# Patient Record
Sex: MALE | Race: BLACK OR AFRICAN AMERICAN | NOT HISPANIC OR LATINO | Employment: OTHER | URBAN - METROPOLITAN AREA
[De-identification: names, ages, dates, MRNs, and addresses within clinical notes are randomized per-mention and may not be internally consistent; named-entity substitution may affect disease eponyms.]

---

## 2017-03-16 ENCOUNTER — HOSPITAL ENCOUNTER (OUTPATIENT)
Dept: RADIOLOGY | Facility: OTHER | Age: 72
Discharge: HOME | End: 2017-03-16
Attending: INTERNAL MEDICINE
Payer: MEDICARE

## 2017-03-16 DIAGNOSIS — I65.21 STENOSIS OF RIGHT CAROTID ARTERY: ICD-10-CM

## 2017-03-16 DIAGNOSIS — I65.21 OCCLUSION OF RIGHT CAROTID ARTERY: ICD-10-CM

## 2017-03-16 PROCEDURE — 93880 EXTRACRANIAL BILAT STUDY: CPT | Mod: 26,,, | Performed by: RADIOLOGY

## 2017-03-16 PROCEDURE — 93880 EXTRACRANIAL BILAT STUDY: CPT | Mod: TC

## 2017-04-13 ENCOUNTER — OFFICE VISIT (OUTPATIENT)
Dept: PODIATRY | Facility: CLINIC | Age: 72
End: 2017-04-13
Payer: MEDICARE

## 2017-04-13 VITALS
RESPIRATION RATE: 18 BRPM | BODY MASS INDEX: 24.49 KG/M2 | SYSTOLIC BLOOD PRESSURE: 128 MMHG | DIASTOLIC BLOOD PRESSURE: 75 MMHG | WEIGHT: 197 LBS | HEART RATE: 62 BPM | HEIGHT: 75 IN

## 2017-04-13 DIAGNOSIS — B35.1 ONYCHOMYCOSIS DUE TO DERMATOPHYTE: ICD-10-CM

## 2017-04-13 DIAGNOSIS — E11.49 TYPE II DIABETES MELLITUS WITH NEUROLOGICAL MANIFESTATIONS: Primary | ICD-10-CM

## 2017-04-13 DIAGNOSIS — L84 CORN OR CALLUS: ICD-10-CM

## 2017-04-13 PROCEDURE — 99213 OFFICE O/P EST LOW 20 MIN: CPT | Mod: PBBFAC | Performed by: PODIATRIST

## 2017-04-13 PROCEDURE — 99499 UNLISTED E&M SERVICE: CPT | Mod: S$PBB,,, | Performed by: PODIATRIST

## 2017-04-13 PROCEDURE — 11721 DEBRIDE NAIL 6 OR MORE: CPT | Mod: 59,Q9,PBBFAC | Performed by: PODIATRIST

## 2017-04-13 PROCEDURE — 11056 PARNG/CUTG B9 HYPRKR LES 2-4: CPT | Mod: Q9,S$PBB,, | Performed by: PODIATRIST

## 2017-04-13 PROCEDURE — 11721 DEBRIDE NAIL 6 OR MORE: CPT | Mod: 59,Q9,S$PBB, | Performed by: PODIATRIST

## 2017-04-13 PROCEDURE — 99999 PR PBB SHADOW E&M-EST. PATIENT-LVL III: CPT | Mod: PBBFAC,,, | Performed by: PODIATRIST

## 2017-04-13 PROCEDURE — 11056 PARNG/CUTG B9 HYPRKR LES 2-4: CPT | Mod: Q9,PBBFAC | Performed by: PODIATRIST

## 2017-04-13 RX ORDER — METFORMIN HYDROCHLORIDE 500 MG/1
TABLET ORAL
COMMUNITY
Start: 2017-01-19 | End: 2019-07-22

## 2017-04-13 RX ORDER — BIMATOPROST 0.1 MG/ML
SOLUTION/ DROPS OPHTHALMIC NIGHTLY
COMMUNITY
Start: 2017-03-28

## 2017-04-13 NOTE — PROGRESS NOTES
Subjective:      Patient ID: Klever Link is a 72 y.o. male.    Chief Complaint: PCP (PETRONA SHI 3/17); Diabetic Foot Exam; Nail Problem; and Nail Care    Klever is a 72 y.o. male who presents to the clinic for evaluation and treatment of high risk feet. Klever has a past medical history of Hypertension. The patient's chief complaint is long, thick toenails. This patient has documented high risk feet requiring routine maintenance secondary to peripheral vascular disease.    PCP: Petrona Shi MD    Date Last Seen by PCP:   Chief Complaint   Patient presents with    PCP     PETRONA SHI 3/17    Diabetic Foot Exam    Nail Problem    Nail Care       Current shoe gear:  Casual shoes          Patient Active Problem List   Diagnosis    Diabetes mellitus, new onset    DM type 2 (diabetes mellitus, type 2)    Acute renal insufficiency    Dehydration    Hyperglycemia    Hypertension    Hypoxia    Glucosuria    Hyponatremia    TIA (transient ischemic attack)    Expressive aphasia    Hypertriglyceridemia       Current Outpatient Prescriptions on File Prior to Visit   Medication Sig Dispense Refill    aspirin 325 MG tablet Take 325 mg by mouth once daily.      atorvastatin (LIPITOR) 20 MG tablet Take 20 mg by mouth once daily.      blood sugar diagnostic Strp 1 strip by Misc.(Non-Drug; Combo Route) route 3 (three) times daily. 100 strip 1    econazole nitrate 1 % cream Apply topically 2 (two) times daily. 85 g 1    hydrochlorothiazide (HYDRODIURIL) 25 MG tablet Take 25 mg by mouth once daily.      lancets (ONE TOUCH DELICA LANCETS) 33 gauge Misc 1 lancet by Misc.(Non-Drug; Combo Route) route 3 (three) times daily before meals. 100 each 1    lisinopril (PRINIVIL,ZESTRIL) 20 MG tablet Take 20 mg by mouth once daily.       No current facility-administered medications on file prior to visit.        Review of patient's allergies indicates:  No Known Allergies    Past Surgical History:  "  Procedure Laterality Date    KNEE SURGERY Bilateral        Family History   Problem Relation Age of Onset    Diabetes Mother     Hypertension Father     Stroke Father     Diabetes Sister     Diabetes Sister     Diabetes Brother     Diabetes Sister     Diabetes Brother        Social History     Social History    Marital status:      Spouse name: N/A    Number of children: N/A    Years of education: N/A     Occupational History    Not on file.     Social History Main Topics    Smoking status: Former Smoker    Smokeless tobacco: Not on file    Alcohol use Yes    Drug use: No    Sexual activity: Yes     Partners: Female     Other Topics Concern    Not on file     Social History Narrative           Hemoglobin A1C   Date Value Ref Range Status   02/12/2015 13.5 (H) 4.5 - 6.2 % Final       Review of Systems   Constitution: Negative for chills, decreased appetite and fever.   Cardiovascular: Negative for leg swelling.   Skin: Positive for dry skin and nail changes.   Musculoskeletal: Negative for arthritis, back pain, joint pain, joint swelling and myalgias.   Gastrointestinal: Negative for nausea and vomiting.   Neurological: Positive for numbness. Negative for loss of balance and paresthesias.           Objective:       Vitals:    04/13/17 0716   BP: 128/75   Pulse: 62   Resp: 18   Weight: 89.4 kg (197 lb)   Height: 6' 3" (1.905 m)   PainSc: 0-No pain        Physical Exam   Constitutional: He is oriented to person, place, and time. He appears well-developed and well-nourished.   Cardiovascular:   Dorsalis pedis and posterior tibial pulses are diminished Bilaterally. Toes are cool to touch. Feet are warm proximally.There is decreased digital hair. Skin is atrophic, slightly hyperpigmented, and mildly edematous       Musculoskeletal: Normal range of motion. He exhibits no edema or tenderness.   Adequate joint range of motion without pain, limitation, nor crepitation Bilateral feet and ankle " joints. Muscle strength is 5/5 in all groups bilaterally.         Neurological: He is alert and oriented to person, place, and time.   [place]-Alex 5.07 monofilamant testing is diminished Dheeraj feet. Sharp/dull sensation diminished Bilaterally. Light touch absent Bilaterally.       Skin: Skin is warm, dry and intact. No ecchymosis and no lesion noted. No erythema.   Nails x 10  are elongated by  1-4mm's, thickened by 2-4 mm's, dystrophic, and are darkened in  coloration . Xerosis Bilaterally. No open lesions noted.    Hyperkeratotic tissue noted to medial HIPJ b/l      Psychiatric: He has a normal mood and affect. His behavior is normal.   Nursing note and vitals reviewed.            Assessment:       Encounter Diagnoses   Name Primary?    Type II diabetes mellitus with neurological manifestations Yes    Onychomycosis due to dermatophyte     Corn or callus          Plan:       Klever was seen today for pcp, diabetic foot exam, nail problem and nail care.    Diagnoses and all orders for this visit:    Type II diabetes mellitus with neurological manifestations    Onychomycosis due to dermatophyte    Corn or callus      I counseled the patient on his conditions, their implications and medical management.        - Shoe inspection. Diabetic Foot Education. Patient reminded of the importance of good nutrition and blood sugar control to help prevent podiatric complications of diabetes. Patient instructed on proper foot hygeine. We discussed wearing proper shoe gear, daily foot inspections, never walking without protective shoe gear, never putting sharp instruments to feet, routine podiatric nail visits every 2-3 months.      - With patient's permission, nails were aggressively reduced and debrided x 10 to their soft tissue attachment mechanically and with electric , removing all offending nail and debris. Patient relates relief following the procedure. He will continue to monitor the areas daily, inspect his  feet, wear protective shoe gear when ambulatory, moisturizer to maintain skin integrity and follow in this office in approximately 2-3 months, sooner p.r.n.    - After cleansing the  area w/ alcohol prep pad the above mentioned hyperkeratosis was trimmed utilizing No 15 scapel, to a smooth base with out incident. Patient tolerated this  well and reported comfort to the area of medial HIPJ b/l

## 2017-07-20 ENCOUNTER — OFFICE VISIT (OUTPATIENT)
Dept: PODIATRY | Facility: CLINIC | Age: 72
End: 2017-07-20
Payer: MEDICARE

## 2017-07-20 VITALS
HEIGHT: 75 IN | SYSTOLIC BLOOD PRESSURE: 110 MMHG | HEART RATE: 64 BPM | WEIGHT: 195 LBS | BODY MASS INDEX: 24.25 KG/M2 | DIASTOLIC BLOOD PRESSURE: 74 MMHG

## 2017-07-20 DIAGNOSIS — L84 CORN OR CALLUS: ICD-10-CM

## 2017-07-20 DIAGNOSIS — E11.49 TYPE II DIABETES MELLITUS WITH NEUROLOGICAL MANIFESTATIONS: Primary | ICD-10-CM

## 2017-07-20 DIAGNOSIS — B35.1 ONYCHOMYCOSIS DUE TO DERMATOPHYTE: ICD-10-CM

## 2017-07-20 PROCEDURE — 11721 DEBRIDE NAIL 6 OR MORE: CPT | Mod: 59,Q9,S$PBB, | Performed by: PODIATRIST

## 2017-07-20 PROCEDURE — 11056 PARNG/CUTG B9 HYPRKR LES 2-4: CPT | Mod: Q9,S$PBB,, | Performed by: PODIATRIST

## 2017-07-20 PROCEDURE — 99213 OFFICE O/P EST LOW 20 MIN: CPT | Mod: PBBFAC | Performed by: PODIATRIST

## 2017-07-20 PROCEDURE — 99499 UNLISTED E&M SERVICE: CPT | Mod: S$PBB,,, | Performed by: PODIATRIST

## 2017-07-20 PROCEDURE — 11056 PARNG/CUTG B9 HYPRKR LES 2-4: CPT | Mod: Q9,PBBFAC | Performed by: PODIATRIST

## 2017-07-20 PROCEDURE — 99999 PR PBB SHADOW E&M-EST. PATIENT-LVL III: CPT | Mod: PBBFAC,,, | Performed by: PODIATRIST

## 2017-07-20 PROCEDURE — 11721 DEBRIDE NAIL 6 OR MORE: CPT | Mod: 59,Q9,PBBFAC | Performed by: PODIATRIST

## 2017-07-20 NOTE — PROGRESS NOTES
Subjective:      Patient ID: Klever Link is a 72 y.o. male.    Chief Complaint: Diabetes Mellitus (pcp  Dr Welch  7/17); Diabetic Foot Exam; and Nail Care    Klever is a 72 y.o. male who presents to the clinic for evaluation and treatment of high risk feet. Klever has a past medical history of Hypertension. The patient's chief complaint is long, thick toenails. This patient has documented high risk feet requiring routine maintenance secondary to peripheral vascular disease.    PCP: Petrona Welch MD    Date Last Seen by PCP:   Chief Complaint   Patient presents with    Diabetes Mellitus     pcp  Dr Welch  7/17    Diabetic Foot Exam    Nail Care       Current shoe gear:  Casual shoes          Patient Active Problem List   Diagnosis    Diabetes mellitus, new onset    DM type 2 (diabetes mellitus, type 2)    Acute renal insufficiency    Dehydration    Hyperglycemia    Hypertension    Hypoxia    Glucosuria    Hyponatremia    TIA (transient ischemic attack)    Expressive aphasia    Hypertriglyceridemia       Current Outpatient Prescriptions on File Prior to Visit   Medication Sig Dispense Refill    ASPIR-LOW 81 mg EC tablet       aspirin 325 MG tablet Take 325 mg by mouth once daily.      atorvastatin (LIPITOR) 20 MG tablet Take 20 mg by mouth once daily.      blood sugar diagnostic Strp 1 strip by Misc.(Non-Drug; Combo Route) route 3 (three) times daily. 100 strip 1    hydrochlorothiazide (HYDRODIURIL) 25 MG tablet Take 25 mg by mouth once daily.      lancets (ONE TOUCH DELICA LANCETS) 33 gauge Misc 1 lancet by Misc.(Non-Drug; Combo Route) route 3 (three) times daily before meals. 100 each 1    lisinopril (PRINIVIL,ZESTRIL) 20 MG tablet Take 20 mg by mouth once daily.      LUMIGAN 0.01 % Drop       metformin (GLUCOPHAGE) 500 MG tablet       econazole nitrate 1 % cream Apply topically 2 (two) times daily. 85 g 1     No current facility-administered medications on file prior to visit.   "      Review of patient's allergies indicates:  No Known Allergies    Past Surgical History:   Procedure Laterality Date    KNEE SURGERY Bilateral        Family History   Problem Relation Age of Onset    Diabetes Mother     Hypertension Father     Stroke Father     Diabetes Sister     Diabetes Sister     Diabetes Brother     Diabetes Sister     Diabetes Brother        Social History     Social History    Marital status:      Spouse name: N/A    Number of children: N/A    Years of education: N/A     Occupational History    Not on file.     Social History Main Topics    Smoking status: Former Smoker    Smokeless tobacco: Not on file    Alcohol use Yes    Drug use: No    Sexual activity: Yes     Partners: Female     Other Topics Concern    Not on file     Social History Narrative    No narrative on file           Hemoglobin A1C   Date Value Ref Range Status   02/12/2015 13.5 (H) 4.5 - 6.2 % Final       Review of Systems   Constitution: Negative for chills, decreased appetite and fever.   Cardiovascular: Negative for leg swelling.   Skin: Positive for dry skin and nail changes. Negative for flushing, itching and poor wound healing.   Musculoskeletal: Negative for arthritis, back pain, joint pain, joint swelling and myalgias.   Gastrointestinal: Negative for nausea and vomiting.   Neurological: Positive for numbness. Negative for loss of balance and paresthesias.           Objective:       Vitals:    07/20/17 0917   BP: 110/74   Pulse: 64   Weight: 88.5 kg (195 lb)   Height: 6' 3" (1.905 m)   PainSc: 0-No pain        Physical Exam   Constitutional: He is oriented to person, place, and time. He appears well-developed and well-nourished.   Cardiovascular:   Dorsalis pedis and posterior tibial pulses are diminished Bilaterally. Toes are cool to touch. Feet are warm proximally.There is decreased digital hair. Skin is atrophic, slightly hyperpigmented, and mildly edematous       Musculoskeletal: " Normal range of motion. He exhibits no edema or tenderness.   Adequate joint range of motion without pain, limitation, nor crepitation Bilateral feet and ankle joints. Muscle strength is 5/5 in all groups bilaterally.         Neurological: He is alert and oriented to person, place, and time.   [place]-Alex 5.07 monofilamant testing is diminished Dheeraj feet. Sharp/dull sensation diminished Bilaterally. Light touch absent Bilaterally.       Skin: Skin is warm, dry and intact. No abrasion, no bruising, no ecchymosis and no lesion noted. No erythema.   Nails x 10  are elongated by  1-2mm's, thickened by 2-4 mm's, dystrophic, and are darkened in  coloration . Xerosis Bilaterally. No open lesions noted.    Hyperkeratotic tissue noted to medial HIPJ b/l      Psychiatric: He has a normal mood and affect. His behavior is normal.   Nursing note and vitals reviewed.            Assessment:       Encounter Diagnoses   Name Primary?    Type II diabetes mellitus with neurological manifestations Yes    Onychomycosis due to dermatophyte     Corn or callus          Plan:       Klever was seen today for diabetes mellitus, diabetic foot exam and nail care.    Diagnoses and all orders for this visit:    Type II diabetes mellitus with neurological manifestations    Onychomycosis due to dermatophyte    Corn or callus      I counseled the patient on his conditions, their implications and medical management.        - Shoe inspection. Diabetic Foot Education. Patient reminded of the importance of good nutrition and blood sugar control to help prevent podiatric complications of diabetes. Patient instructed on proper foot hygeine. We discussed wearing proper shoe gear, daily foot inspections, never walking without protective shoe gear, never putting sharp instruments to feet, routine podiatric nail visits every 2-3 months.      - With patient's permission, nails were aggressively reduced and debrided x 10 to their soft tissue attachment  mechanically and with electric , removing all offending nail and debris. Patient relates relief following the procedure. He will continue to monitor the areas daily, inspect his feet, wear protective shoe gear when ambulatory, moisturizer to maintain skin integrity and follow in this office in approximately 2-3 months, sooner p.r.n.    - After cleansing the  area w/ alcohol prep pad the above mentioned hyperkeratosis was trimmed utilizing No 15 scapel, to a smooth base with out incident. Patient tolerated this  well and reported comfort to the area of medial HIPJ b/l

## 2017-11-13 ENCOUNTER — OFFICE VISIT (OUTPATIENT)
Dept: PODIATRY | Facility: CLINIC | Age: 72
End: 2017-11-13
Payer: MEDICARE

## 2017-11-13 VITALS
SYSTOLIC BLOOD PRESSURE: 189 MMHG | BODY MASS INDEX: 24.25 KG/M2 | HEART RATE: 62 BPM | HEIGHT: 75 IN | DIASTOLIC BLOOD PRESSURE: 99 MMHG | WEIGHT: 195 LBS | RESPIRATION RATE: 18 BRPM

## 2017-11-13 DIAGNOSIS — B35.1 ONYCHOMYCOSIS DUE TO DERMATOPHYTE: ICD-10-CM

## 2017-11-13 DIAGNOSIS — L84 CORN OR CALLUS: ICD-10-CM

## 2017-11-13 DIAGNOSIS — E11.49 TYPE II DIABETES MELLITUS WITH NEUROLOGICAL MANIFESTATIONS: Primary | ICD-10-CM

## 2017-11-13 PROCEDURE — 11721 DEBRIDE NAIL 6 OR MORE: CPT | Mod: 59,Q9,PBBFAC | Performed by: PODIATRIST

## 2017-11-13 PROCEDURE — 11721 DEBRIDE NAIL 6 OR MORE: CPT | Mod: 59,Q9,S$PBB, | Performed by: PODIATRIST

## 2017-11-13 PROCEDURE — 11056 PARNG/CUTG B9 HYPRKR LES 2-4: CPT | Mod: Q9,S$PBB,, | Performed by: PODIATRIST

## 2017-11-13 PROCEDURE — 99499 UNLISTED E&M SERVICE: CPT | Mod: S$PBB,,, | Performed by: PODIATRIST

## 2017-11-13 PROCEDURE — 99213 OFFICE O/P EST LOW 20 MIN: CPT | Mod: PBBFAC,25 | Performed by: PODIATRIST

## 2017-11-13 PROCEDURE — 99999 PR PBB SHADOW E&M-EST. PATIENT-LVL III: CPT | Mod: PBBFAC,,, | Performed by: PODIATRIST

## 2017-11-13 PROCEDURE — 11056 PARNG/CUTG B9 HYPRKR LES 2-4: CPT | Mod: Q9,PBBFAC | Performed by: PODIATRIST

## 2017-11-13 RX ORDER — BRINZOLAMIDE/BRIMONIDINE TARTRATE 10; 2 MG/ML; MG/ML
SUSPENSION/ DROPS OPHTHALMIC
COMMUNITY
Start: 2017-11-02 | End: 2019-07-22

## 2017-11-13 NOTE — PROGRESS NOTES
Subjective:      Patient ID: Klever Link is a 72 y.o. male.    Chief Complaint: PCP (PETRONA SHI  Oct/15/17); Diabetic Foot Exam; Nail Problem; and Nail Care    Klever is a 72 y.o. male who presents to the clinic for evaluation and treatment of high risk feet. Klever has a past medical history of Hypertension. The patient's chief complaint is long, thick toenails. This patient has documented high risk feet requiring routine maintenance secondary to peripheral vascular disease.    PCP: Petrona Shi MD    Date Last Seen by PCP:   Chief Complaint   Patient presents with    PCP     PETRONA SHI  Oct/15/17    Diabetic Foot Exam    Nail Problem    Nail Care       Current shoe gear:  Casual shoes          Patient Active Problem List   Diagnosis    Diabetes mellitus, new onset    DM type 2 (diabetes mellitus, type 2)    Acute renal insufficiency    Dehydration    Hyperglycemia    Hypertension    Hypoxia    Glucosuria    Hyponatremia    TIA (transient ischemic attack)    Expressive aphasia    Hypertriglyceridemia       Current Outpatient Prescriptions on File Prior to Visit   Medication Sig Dispense Refill    ASPIR-LOW 81 mg EC tablet       aspirin 325 MG tablet Take 325 mg by mouth once daily.      atorvastatin (LIPITOR) 20 MG tablet Take 20 mg by mouth once daily.      blood sugar diagnostic Strp 1 strip by Misc.(Non-Drug; Combo Route) route 3 (three) times daily. 100 strip 1    hydrochlorothiazide (HYDRODIURIL) 25 MG tablet Take 25 mg by mouth once daily.      lancets (ONE TOUCH DELICA LANCETS) 33 gauge Misc 1 lancet by Misc.(Non-Drug; Combo Route) route 3 (three) times daily before meals. 100 each 1    lisinopril (PRINIVIL,ZESTRIL) 20 MG tablet Take 20 mg by mouth once daily.      LUMIGAN 0.01 % Drop       metformin (GLUCOPHAGE) 500 MG tablet       econazole nitrate 1 % cream Apply topically 2 (two) times daily. 85 g 1     No current facility-administered medications  "on file prior to visit.        Review of patient's allergies indicates:  No Known Allergies    Past Surgical History:   Procedure Laterality Date    KNEE SURGERY Bilateral        Family History   Problem Relation Age of Onset    Diabetes Mother     Hypertension Father     Stroke Father     Diabetes Sister     Diabetes Sister     Diabetes Brother     Diabetes Sister     Diabetes Brother        Social History     Social History    Marital status:      Spouse name: N/A    Number of children: N/A    Years of education: N/A     Occupational History    Not on file.     Social History Main Topics    Smoking status: Former Smoker    Smokeless tobacco: Not on file    Alcohol use Yes    Drug use: No    Sexual activity: Yes     Partners: Female     Other Topics Concern    Not on file     Social History Narrative    No narrative on file           Hemoglobin A1C   Date Value Ref Range Status   02/12/2015 13.5 (H) 4.5 - 6.2 % Final       Review of Systems   Constitution: Negative for chills, decreased appetite and fever.   Cardiovascular: Negative for leg swelling.   Skin: Positive for dry skin and nail changes. Negative for flushing, itching, poor wound healing, rash, skin cancer and suspicious lesions.   Musculoskeletal: Negative for arthritis, back pain, joint pain, joint swelling and myalgias.   Gastrointestinal: Negative for nausea and vomiting.   Neurological: Positive for numbness. Negative for loss of balance and paresthesias.           Objective:       Vitals:    11/13/17 1509   BP: (!) 189/99   Pulse: 62   Resp: 18   Weight: 88.5 kg (195 lb)   Height: 6' 3" (1.905 m)   PainSc: 0-No pain        Physical Exam   Constitutional: He is oriented to person, place, and time. He appears well-developed and well-nourished.   Cardiovascular:   Dorsalis pedis and posterior tibial pulses are diminished Bilaterally. Toes are cool to touch. Feet are warm proximally.There is decreased digital hair. Skin is " atrophic, slightly hyperpigmented, and mildly edematous       Musculoskeletal: Normal range of motion. He exhibits no edema or tenderness.   Adequate joint range of motion without pain, limitation, nor crepitation Bilateral feet and ankle joints. Muscle strength is 5/5 in all groups bilaterally.         Neurological: He is alert and oriented to person, place, and time.   [place]-Alex 5.07 monofilamant testing is diminished Dheeraj feet. Sharp/dull sensation diminished Bilaterally. Light touch absent Bilaterally.       Skin: Skin is warm, dry and intact. No abrasion, no bruising, no ecchymosis and no lesion noted. No erythema.   Nails x 10  are elongated by  1-5mm's, thickened by 2-4 mm's, dystrophic, and are darkened in  coloration . Xerosis Bilaterally. No open lesions noted.    Hyperkeratotic tissue noted to medial HIPJ b/l      Psychiatric: He has a normal mood and affect. His behavior is normal.   Nursing note and vitals reviewed.            Assessment:       Encounter Diagnoses   Name Primary?    Type II diabetes mellitus with neurological manifestations Yes    Onychomycosis due to dermatophyte     Corn or callus          Plan:       Klever was seen today for pcp, diabetic foot exam, nail problem and nail care.    Diagnoses and all orders for this visit:    Type II diabetes mellitus with neurological manifestations    Onychomycosis due to dermatophyte    Corn or callus      I counseled the patient on his conditions, their implications and medical management.        - Shoe inspection. Diabetic Foot Education. Patient reminded of the importance of good nutrition and blood sugar control to help prevent podiatric complications of diabetes. Patient instructed on proper foot hygeine. We discussed wearing proper shoe gear, daily foot inspections, never walking without protective shoe gear, never putting sharp instruments to feet, routine podiatric nail visits every 2-3 months.      - With patient's permission, nails  were aggressively reduced and debrided x 10 to their soft tissue attachment mechanically and with electric , removing all offending nail and debris. Patient relates relief following the procedure. He will continue to monitor the areas daily, inspect his feet, wear protective shoe gear when ambulatory, moisturizer to maintain skin integrity and follow in this office in approximately 2-3 months, sooner p.r.n.    - After cleansing the  area w/ alcohol prep pad the above mentioned hyperkeratosis was trimmed utilizing No 15 scapel, to a smooth base with out incident. Patient tolerated this  well and reported comfort to the area of medial HIPJ b/l

## 2018-02-14 ENCOUNTER — OFFICE VISIT (OUTPATIENT)
Dept: PODIATRY | Facility: CLINIC | Age: 73
End: 2018-02-14
Payer: MEDICARE

## 2018-02-14 VITALS
HEART RATE: 83 BPM | HEIGHT: 75 IN | DIASTOLIC BLOOD PRESSURE: 70 MMHG | BODY MASS INDEX: 23.87 KG/M2 | WEIGHT: 192 LBS | SYSTOLIC BLOOD PRESSURE: 132 MMHG

## 2018-02-14 DIAGNOSIS — E11.49 TYPE II DIABETES MELLITUS WITH NEUROLOGICAL MANIFESTATIONS: Primary | ICD-10-CM

## 2018-02-14 DIAGNOSIS — B35.1 ONYCHOMYCOSIS DUE TO DERMATOPHYTE: ICD-10-CM

## 2018-02-14 PROCEDURE — 99213 OFFICE O/P EST LOW 20 MIN: CPT | Mod: PBBFAC,25 | Performed by: PODIATRIST

## 2018-02-14 PROCEDURE — 99999 PR PBB SHADOW E&M-EST. PATIENT-LVL III: CPT | Mod: PBBFAC,,, | Performed by: PODIATRIST

## 2018-02-14 PROCEDURE — 99499 UNLISTED E&M SERVICE: CPT | Mod: S$PBB,,, | Performed by: PODIATRIST

## 2018-02-14 PROCEDURE — 11721 DEBRIDE NAIL 6 OR MORE: CPT | Mod: Q9,PBBFAC | Performed by: PODIATRIST

## 2018-02-14 PROCEDURE — 11721 DEBRIDE NAIL 6 OR MORE: CPT | Mod: Q9,S$PBB,, | Performed by: PODIATRIST

## 2018-02-14 NOTE — PROGRESS NOTES
Subjective:      Patient ID: Klever Link is a 73 y.o. male.    Chief Complaint: Follow-up; Diabetes Mellitus (Dr. Welch 10/15/17); and Diabetic Foot Exam    Klever is a 73 y.o. male who presents to the clinic for evaluation and treatment of high risk feet. Klever has a past medical history of Hypertension. The patient's chief complaint is long, thick toenails. This patient has documented high risk feet requiring routine maintenance secondary to peripheral vascular disease.    PCP: Petrona Welch MD    Date Last Seen by PCP:   Chief Complaint   Patient presents with    Follow-up    Diabetes Mellitus     Dr. Welch 10/15/17    Diabetic Foot Exam       Current shoe gear:  Casual shoes          Patient Active Problem List   Diagnosis    Diabetes mellitus, new onset    DM type 2 (diabetes mellitus, type 2)    Acute renal insufficiency    Dehydration    Hyperglycemia    Hypertension    Hypoxia    Glucosuria    Hyponatremia    TIA (transient ischemic attack)    Expressive aphasia    Hypertriglyceridemia       Current Outpatient Prescriptions on File Prior to Visit   Medication Sig Dispense Refill    ASPIR-LOW 81 mg EC tablet       atorvastatin (LIPITOR) 20 MG tablet Take 20 mg by mouth once daily.      blood sugar diagnostic Strp 1 strip by Misc.(Non-Drug; Combo Route) route 3 (three) times daily. 100 strip 1    lancets (ONE TOUCH DELICA LANCETS) 33 gauge Misc 1 lancet by Misc.(Non-Drug; Combo Route) route 3 (three) times daily before meals. 100 each 1    lisinopril (PRINIVIL,ZESTRIL) 20 MG tablet Take 20 mg by mouth once daily.      LUMIGAN 0.01 % Drop       metformin (GLUCOPHAGE) 500 MG tablet       SIMBRINZA 1-0.2 % DrpS       aspirin 325 MG tablet Take 325 mg by mouth once daily.      econazole nitrate 1 % cream Apply topically 2 (two) times daily. 85 g 1    hydrochlorothiazide (HYDRODIURIL) 25 MG tablet Take 25 mg by mouth once daily.       No current facility-administered  "medications on file prior to visit.        Review of patient's allergies indicates:  No Known Allergies    Past Surgical History:   Procedure Laterality Date    KNEE SURGERY Bilateral        Family History   Problem Relation Age of Onset    Diabetes Mother     Hypertension Father     Stroke Father     Diabetes Sister     Diabetes Sister     Diabetes Brother     Diabetes Sister     Diabetes Brother        Social History     Social History    Marital status:      Spouse name: N/A    Number of children: N/A    Years of education: N/A     Occupational History    Not on file.     Social History Main Topics    Smoking status: Former Smoker    Smokeless tobacco: Not on file    Alcohol use Yes    Drug use: No    Sexual activity: Yes     Partners: Female     Other Topics Concern    Not on file     Social History Narrative    No narrative on file           Hemoglobin A1C   Date Value Ref Range Status   02/12/2015 13.5 (H) 4.5 - 6.2 % Final       Review of Systems   Constitution: Negative for chills, decreased appetite and fever.   Cardiovascular: Negative for leg swelling.   Skin: Positive for dry skin and nail changes. Negative for color change, flushing, itching, poor wound healing, rash, skin cancer and suspicious lesions.   Musculoskeletal: Negative for arthritis, back pain, joint pain, joint swelling and myalgias.   Gastrointestinal: Negative for nausea and vomiting.   Neurological: Positive for numbness. Negative for loss of balance and paresthesias.           Objective:       Vitals:    02/14/18 0805   BP: 132/70   Pulse: 83   Weight: 87.1 kg (192 lb)   Height: 6' 3" (1.905 m)   PainSc: 0-No pain        Physical Exam   Constitutional: He is oriented to person, place, and time. He appears well-developed and well-nourished.   Cardiovascular:   Dorsalis pedis and posterior tibial pulses are diminished Bilaterally. Toes are cool to touch. Feet are warm proximally.There is decreased digital hair. " Skin is atrophic, slightly hyperpigmented, and mildly edematous       Musculoskeletal: Normal range of motion. He exhibits no edema or tenderness.   Adequate joint range of motion without pain, limitation, nor crepitation Bilateral feet and ankle joints. Muscle strength is 5/5 in all groups bilaterally.         Neurological: He is alert and oriented to person, place, and time.   [place]-Alex 5.07 monofilamant testing is diminished Dheeraj feet. Sharp/dull sensation diminished Bilaterally. Light touch absent Bilaterally.       Skin: Skin is warm, dry and intact. No abrasion, no bruising, no ecchymosis and no lesion noted. No erythema.   Nails x 10  are elongated by  1-4mm's, thickened by 2-4 mm's, dystrophic, and are darkened in  coloration . Xerosis Bilaterally. No open lesions noted.     Psychiatric: He has a normal mood and affect. His behavior is normal.   Nursing note and vitals reviewed.            Assessment:       Encounter Diagnoses   Name Primary?    Type II diabetes mellitus with neurological manifestations Yes    Onychomycosis due to dermatophyte          Plan:       Klever was seen today for follow-up, diabetes mellitus and diabetic foot exam.    Diagnoses and all orders for this visit:    Type II diabetes mellitus with neurological manifestations    Onychomycosis due to dermatophyte      I counseled the patient on his conditions, their implications and medical management.        - Shoe inspection. Diabetic Foot Education. Patient reminded of the importance of good nutrition and blood sugar control to help prevent podiatric complications of diabetes. Patient instructed on proper foot hygeine. We discussed wearing proper shoe gear, daily foot inspections, never walking without protective shoe gear, never putting sharp instruments to feet, routine podiatric nail visits every 2-3 months.      - With patient's permission, nails were aggressively reduced and debrided x 10 to their soft tissue attachment  mechanically and with electric , removing all offending nail and debris. Patient relates relief following the procedure. He will continue to monitor the areas daily, inspect his feet, wear protective shoe gear when ambulatory, moisturizer to maintain skin integrity and follow in this office in approximately 2-3 months, sooner p.r.n.

## 2018-05-10 ENCOUNTER — OFFICE VISIT (OUTPATIENT)
Dept: PODIATRY | Facility: CLINIC | Age: 73
End: 2018-05-10
Payer: MEDICARE

## 2018-05-10 VITALS
SYSTOLIC BLOOD PRESSURE: 109 MMHG | DIASTOLIC BLOOD PRESSURE: 67 MMHG | HEIGHT: 75 IN | WEIGHT: 192 LBS | HEART RATE: 91 BPM | BODY MASS INDEX: 23.87 KG/M2

## 2018-05-10 DIAGNOSIS — L84 CORN OR CALLUS: ICD-10-CM

## 2018-05-10 DIAGNOSIS — E11.49 TYPE II DIABETES MELLITUS WITH NEUROLOGICAL MANIFESTATIONS: Primary | ICD-10-CM

## 2018-05-10 DIAGNOSIS — B35.1 ONYCHOMYCOSIS DUE TO DERMATOPHYTE: ICD-10-CM

## 2018-05-10 PROCEDURE — 11721 DEBRIDE NAIL 6 OR MORE: CPT | Mod: Q9,PBBFAC | Performed by: PODIATRIST

## 2018-05-10 PROCEDURE — 11056 PARNG/CUTG B9 HYPRKR LES 2-4: CPT | Mod: 59,Q9,PBBFAC | Performed by: PODIATRIST

## 2018-05-10 PROCEDURE — 99999 PR PBB SHADOW E&M-EST. PATIENT-LVL III: CPT | Mod: PBBFAC,,, | Performed by: PODIATRIST

## 2018-05-10 PROCEDURE — 99499 UNLISTED E&M SERVICE: CPT | Mod: S$PBB,,, | Performed by: PODIATRIST

## 2018-05-10 PROCEDURE — 11056 PARNG/CUTG B9 HYPRKR LES 2-4: CPT | Mod: Q9,S$PBB,, | Performed by: PODIATRIST

## 2018-05-10 PROCEDURE — 99213 OFFICE O/P EST LOW 20 MIN: CPT | Mod: PBBFAC | Performed by: PODIATRIST

## 2018-05-10 PROCEDURE — 11721 DEBRIDE NAIL 6 OR MORE: CPT | Mod: 59,Q9,S$PBB, | Performed by: PODIATRIST

## 2018-05-10 NOTE — PROGRESS NOTES
"Subjective:      Patient ID: Klever Link is a 73 y.o. male.    Chief Complaint: Type II diabetes mellitus with neurological manifestation (bilateral pcp Dr Petrona Welch  5/4/18) and Nail Care    Klever is a 73 y.o. male who presents to the clinic for evaluation and treatment of high risk feet. Klever has a past medical history of Hypertension. The patient's chief complaint is long, thick toenails. This patient has documented high risk feet requiring routine maintenance secondary to peripheral vascular disease.    PCP: Petrona Welch MD    Date Last Seen by PCP:   Chief Complaint   Patient presents with    Type II diabetes mellitus with neurological manifestation     bilateral pcp Dr Petrona Welch  5/4/18    Nail Care       Current shoe gear:  Casual shoes      Hemoglobin A1C   Date Value Ref Range Status   02/12/2015 13.5 (H) 4.5 - 6.2 % Final       Review of Systems   Constitution: Negative for chills, decreased appetite and fever.   Cardiovascular: Negative for leg swelling.   Skin: Positive for dry skin and nail changes. Negative for color change, flushing, itching, poor wound healing, rash, skin cancer and suspicious lesions.   Musculoskeletal: Negative for arthritis, back pain, joint pain, joint swelling and myalgias.   Gastrointestinal: Negative for nausea and vomiting.   Neurological: Positive for numbness. Negative for loss of balance and paresthesias.           Objective:       Vitals:    05/10/18 0752   BP: 109/67   Pulse: 91   Weight: 87.1 kg (192 lb)   Height: 6' 3" (1.905 m)   PainSc: 0-No pain        Physical Exam   Constitutional: He is oriented to person, place, and time. He appears well-developed and well-nourished.   Cardiovascular:   Dorsalis pedis and posterior tibial pulses are diminished Bilaterally. Toes are cool to touch. Feet are warm proximally.There is decreased digital hair. Skin is atrophic, slightly hyperpigmented, and mildly edematous       Musculoskeletal: Normal " range of motion. He exhibits no edema or tenderness.   Adequate joint range of motion without pain, limitation, nor crepitation Bilateral feet and ankle joints. Muscle strength is 5/5 in all groups bilaterally.         Neurological: He is alert and oriented to person, place, and time.   [place]-Alex 5.07 monofilamant testing is diminished Dheeraj feet. Sharp/dull sensation diminished Bilaterally. Light touch absent Bilaterally.       Skin: Skin is warm, dry and intact. No abrasion, no bruising, no ecchymosis and no lesion noted. No erythema.   Nails x 10  are elongated by  2-6mm's, thickened by 2-4 mm's, dystrophic, and are darkened in  coloration . Xerosis Bilaterally. No open lesions noted.    Hyperkeratotic tissue noted to medial HIPJ b/l      Psychiatric: He has a normal mood and affect. His behavior is normal.   Nursing note and vitals reviewed.            Assessment:       Encounter Diagnoses   Name Primary?   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    Diagnoses and all orders for this visit:    Type II diabetes mellitus with neurological manifestations    Onychomycosis due to dermatophyte    Corn or callus      I counseled the patient on his conditions, their implications and medical management.        - Shoe inspection. Diabetic Foot Education. Patient reminded of the importance of good nutrition and blood sugar control to help prevent podiatric complications of diabetes. Patient instructed on proper foot hygeine. We discussed wearing proper shoe gear, daily foot inspections, never walking without protective shoe gear, never putting sharp instruments to feet, routine podiatric nail visits every 2-3 months.      - With patient's permission, nails were aggressively reduced and debrided x 10 to their soft tissue  attachment mechanically and with electric , removing all offending nail and debris. Patient relates relief following the procedure. He will continue to monitor the areas daily, inspect his feet, wear protective shoe gear when ambulatory, moisturizer to maintain skin integrity and follow in this office in approximately 2-3 months, sooner p.r.n.      - After cleansing the  area w/ alcohol prep pad the above mentioned hyperkeratosis was trimmed utilizing No 15 scapel, to a smooth base with out incident. Patient tolerated this  well and reported comfort to the area of medial hipj b/l

## 2018-08-21 ENCOUNTER — OFFICE VISIT (OUTPATIENT)
Dept: PODIATRY | Facility: CLINIC | Age: 73
End: 2018-08-21
Payer: MEDICARE

## 2018-08-21 VITALS
SYSTOLIC BLOOD PRESSURE: 124 MMHG | BODY MASS INDEX: 24 KG/M2 | DIASTOLIC BLOOD PRESSURE: 71 MMHG | HEART RATE: 76 BPM | HEIGHT: 75 IN | WEIGHT: 193 LBS

## 2018-08-21 DIAGNOSIS — B35.1 ONYCHOMYCOSIS DUE TO DERMATOPHYTE: ICD-10-CM

## 2018-08-21 DIAGNOSIS — E11.49 TYPE II DIABETES MELLITUS WITH NEUROLOGICAL MANIFESTATIONS: Primary | ICD-10-CM

## 2018-08-21 DIAGNOSIS — L84 CORN OR CALLUS: ICD-10-CM

## 2018-08-21 PROCEDURE — 99499 UNLISTED E&M SERVICE: CPT | Mod: S$PBB,,, | Performed by: PODIATRIST

## 2018-08-21 PROCEDURE — 11721 DEBRIDE NAIL 6 OR MORE: CPT | Mod: 59,Q9,S$PBB, | Performed by: PODIATRIST

## 2018-08-21 PROCEDURE — 11056 PARNG/CUTG B9 HYPRKR LES 2-4: CPT | Mod: Q9,S$PBB,, | Performed by: PODIATRIST

## 2018-08-21 PROCEDURE — 11721 DEBRIDE NAIL 6 OR MORE: CPT | Mod: Q9,PBBFAC | Performed by: PODIATRIST

## 2018-08-21 PROCEDURE — 11056 PARNG/CUTG B9 HYPRKR LES 2-4: CPT | Mod: Q9,PBBFAC | Performed by: PODIATRIST

## 2018-08-21 PROCEDURE — 99999 PR PBB SHADOW E&M-EST. PATIENT-LVL III: CPT | Mod: PBBFAC,,, | Performed by: PODIATRIST

## 2018-08-21 PROCEDURE — 99213 OFFICE O/P EST LOW 20 MIN: CPT | Mod: PBBFAC,25 | Performed by: PODIATRIST

## 2018-08-21 NOTE — PROGRESS NOTES
"Subjective:      Patient ID: Klever Link is a 73 y.o. male.    Chief Complaint: Diabetes Mellitus (bilateral pcp Dr Russ 8/23/18); Diabetic Foot Exam; and Nail Care    Klever is a 73 y.o. male who presents to the clinic for evaluation and treatment of high risk feet. Klever has a past medical history of Hypertension. The patient's chief complaint is long, thick toenails. This patient has documented high risk feet requiring routine maintenance secondary to peripheral vascular disease.    PCP: Petrona Welch MD    Date Last Seen by PCP:   Chief Complaint   Patient presents with    Diabetes Mellitus     bilateral pcp Dr Russ 8/23/18    Diabetic Foot Exam    Nail Care       Current shoe gear:  Casual shoes      Hemoglobin A1C   Date Value Ref Range Status   02/12/2015 13.5 (H) 4.5 - 6.2 % Final       Review of Systems   Constitution: Negative for chills, decreased appetite and fever.   Cardiovascular: Negative for leg swelling.   Skin: Positive for dry skin and nail changes. Negative for color change, flushing, itching, poor wound healing, rash, skin cancer and suspicious lesions.   Musculoskeletal: Negative for arthritis, back pain, joint pain, joint swelling and myalgias.   Gastrointestinal: Negative for nausea and vomiting.   Neurological: Positive for numbness. Negative for loss of balance and paresthesias.           Objective:       Vitals:    08/21/18 0820   BP: 124/71   Pulse: 76   Weight: 87.5 kg (193 lb)   Height: 6' 3" (1.905 m)   PainSc: 0-No pain        Physical Exam   Constitutional: He is oriented to person, place, and time. He appears well-developed and well-nourished.   Cardiovascular:   Dorsalis pedis and posterior tibial pulses are diminished Bilaterally. Toes are cool to touch. Feet are warm proximally.There is decreased digital hair. Skin is atrophic, slightly hyperpigmented, and mildly edematous       Musculoskeletal: Normal range of motion. He exhibits no edema or tenderness. "   Adequate joint range of motion without pain, limitation, nor crepitation Bilateral feet and ankle joints. Muscle strength is 5/5 in all groups bilaterally.         Neurological: He is alert and oriented to person, place, and time.   [place]-Alex 5.07 monofilamant testing is diminished Dheeraj feet. Sharp/dull sensation diminished Bilaterally. Light touch absent Bilaterally.       Skin: Skin is warm, dry and intact. No abrasion, no bruising, no ecchymosis and no lesion noted. No erythema.   Nails x 10  are elongated by  2-6mm's, thickened by 2-4 mm's, dystrophic, and are darkened in  coloration . Xerosis Bilaterally. No open lesions noted.    Hyperkeratotic tissue noted to medial HIPJ b/l      Psychiatric: He has a normal mood and affect. His behavior is normal.   Nursing note and vitals reviewed.            Assessment:       Encounter Diagnoses   Name Primary?   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    Diagnoses and all orders for this visit:    Type II diabetes mellitus with neurological manifestations    Corn or callus    Onychomycosis due to dermatophyte      I counseled the patient on his conditions, their implications and medical management.        - Shoe inspection. Diabetic Foot Education. Patient reminded of the importance of good nutrition and blood sugar control to help prevent podiatric complications of diabetes. Patient instructed on proper foot hygeine. We discussed wearing proper shoe gear, daily foot inspections, never walking without protective shoe gear, never putting sharp instruments to feet, routine podiatric nail visits every 2-3 months.      - With patient's permission, nails were aggressively reduced and debrided x 10 to their soft tissue attachment mechanically and with electric , removing all offending  nail and debris. Patient relates relief following the procedure. He will continue to monitor the areas daily, inspect his feet, wear protective shoe gear when ambulatory, moisturizer to maintain skin integrity and follow in this office in approximately 2-3 months, sooner p.r.n.      - After cleansing the  area w/ alcohol prep pad the above mentioned hyperkeratosis was trimmed utilizing No 15 scapel, to a smooth base with out incident. Patient tolerated this  well and reported comfort to the area of medial hipj b/l     Discussed regular and routine moisturizer to skin of both feet to help improve dry skin. Advised to apply twice daily until resolution of symptoms. Avoid between toes.     RTC 3 months, sooner PRN

## 2018-11-21 ENCOUNTER — OFFICE VISIT (OUTPATIENT)
Dept: PODIATRY | Facility: CLINIC | Age: 73
End: 2018-11-21
Payer: MEDICARE

## 2018-11-21 VITALS
DIASTOLIC BLOOD PRESSURE: 77 MMHG | HEIGHT: 75 IN | RESPIRATION RATE: 18 BRPM | WEIGHT: 193 LBS | BODY MASS INDEX: 24 KG/M2 | SYSTOLIC BLOOD PRESSURE: 149 MMHG | HEART RATE: 61 BPM

## 2018-11-21 DIAGNOSIS — B35.1 ONYCHOMYCOSIS DUE TO DERMATOPHYTE: ICD-10-CM

## 2018-11-21 DIAGNOSIS — E11.49 TYPE II DIABETES MELLITUS WITH NEUROLOGICAL MANIFESTATIONS: Primary | ICD-10-CM

## 2018-11-21 PROCEDURE — 11721 DEBRIDE NAIL 6 OR MORE: CPT | Mod: Q9,PBBFAC | Performed by: PODIATRIST

## 2018-11-21 PROCEDURE — 11721 DEBRIDE NAIL 6 OR MORE: CPT | Mod: Q9,S$PBB,, | Performed by: PODIATRIST

## 2018-11-21 PROCEDURE — 99213 OFFICE O/P EST LOW 20 MIN: CPT | Mod: PBBFAC,25 | Performed by: PODIATRIST

## 2018-11-21 PROCEDURE — 99999 PR PBB SHADOW E&M-EST. PATIENT-LVL III: CPT | Mod: PBBFAC,,, | Performed by: PODIATRIST

## 2018-11-21 PROCEDURE — 99499 UNLISTED E&M SERVICE: CPT | Mod: S$PBB,,, | Performed by: PODIATRIST

## 2018-11-21 NOTE — PROGRESS NOTES
"Subjective:      Patient ID: Klever Link is a 73 y.o. male.    Chief Complaint: PCP (PETRONA SHI 10/15/18); Diabetic Foot Exam; Nail Problem; and Nail Care    Klever is a 73 y.o. male who presents to the clinic for evaluation and treatment of high risk feet. Klever has a past medical history of Hypertension. The patient's chief complaint is long, thick toenails. This patient has documented high risk feet requiring routine maintenance secondary to peripheral vascular disease.    PCP: Petrona Shi MD    Date Last Seen by PCP:   Chief Complaint   Patient presents with    PCP     PETRONA SHI 10/15/18    Diabetic Foot Exam    Nail Problem    Nail Care       Current shoe gear:  Casual shoes      Hemoglobin A1C   Date Value Ref Range Status   02/12/2015 13.5 (H) 4.5 - 6.2 % Final       Review of Systems   Constitution: Negative for chills, decreased appetite and fever.   Cardiovascular: Negative for leg swelling.   Skin: Positive for dry skin and nail changes. Negative for color change, flushing, rash, skin cancer and suspicious lesions.   Musculoskeletal: Negative for arthritis, back pain, joint pain, joint swelling and myalgias.   Gastrointestinal: Negative for nausea and vomiting.   Neurological: Positive for numbness. Negative for loss of balance and paresthesias.           Objective:       Vitals:    11/21/18 0935   BP: (!) 149/77   Pulse: 61   Resp: 18   Weight: 87.5 kg (193 lb)   Height: 6' 3" (1.905 m)   PainSc: 0-No pain        Physical Exam   Constitutional: He is oriented to person, place, and time. He appears well-developed and well-nourished.   Cardiovascular:   Dorsalis pedis and posterior tibial pulses are diminished Bilaterally. Toes are cool to touch. Feet are warm proximally.There is decreased digital hair. Skin is atrophic, slightly hyperpigmented, and mildly edematous       Musculoskeletal: Normal range of motion. He exhibits no edema or tenderness.   Adequate joint " range of motion without pain, limitation, nor crepitation Bilateral feet and ankle joints. Muscle strength is 5/5 in all groups bilaterally.         Neurological: He is alert and oriented to person, place, and time.   [place]-Alex 5.07 monofilamant testing is diminished Dheeraj feet. Sharp/dull sensation diminished Bilaterally. Light touch absent Bilaterally.       Skin: Skin is warm, dry and intact. No abrasion, no bruising, no ecchymosis and no lesion noted. No erythema. No pallor.   Nails x 10  are elongated by  3-6mm's, thickened by 2-4 mm's, dystrophic, and are darkened in  coloration . Xerosis Bilaterally. No open lesions noted.     Psychiatric: He has a normal mood and affect. His behavior is normal.   Nursing note and vitals reviewed.            Assessment:       Encounter Diagnoses   Name Primary?    Type II diabetes mellitus with neurological manifestations Yes    Onychomycosis due to dermatophyte          Plan:       Klever was seen today for pcp, diabetic foot exam, nail problem and nail care.    Diagnoses and all orders for this visit:    Type II diabetes mellitus with neurological manifestations    Onychomycosis due to dermatophyte      I counseled the patient on his conditions, their implications and medical management.        - Shoe inspection. Diabetic Foot Education. Patient reminded of the importance of good nutrition and blood sugar control to help prevent podiatric complications of diabetes. Patient instructed on proper foot hygeine. We discussed wearing proper shoe gear, daily foot inspections, never walking without protective shoe gear, never putting sharp instruments to feet, routine podiatric nail visits every 2-3 months.      - With patient's permission, nails were aggressively reduced and debrided x 10 to their soft tissue attachment mechanically and with electric , removing all offending nail and debris. Patient relates relief following the procedure. He will continue to monitor  the areas daily, inspect his feet, wear protective shoe gear when ambulatory, moisturizer to maintain skin integrity and follow in this office in approximately 2-3 months, sooner p.r.n.

## 2019-02-19 ENCOUNTER — OFFICE VISIT (OUTPATIENT)
Dept: URGENT CARE | Facility: CLINIC | Age: 74
End: 2019-02-19
Payer: MEDICARE

## 2019-02-19 VITALS
SYSTOLIC BLOOD PRESSURE: 155 MMHG | HEART RATE: 83 BPM | BODY MASS INDEX: 24 KG/M2 | HEIGHT: 75 IN | DIASTOLIC BLOOD PRESSURE: 82 MMHG | TEMPERATURE: 97 F | RESPIRATION RATE: 18 BRPM | OXYGEN SATURATION: 98 % | WEIGHT: 193 LBS

## 2019-02-19 DIAGNOSIS — R47.81 SLURRED SPEECH: Primary | ICD-10-CM

## 2019-02-19 PROBLEM — R47.1 DYSARTHRIA: Status: ACTIVE | Noted: 2019-02-19

## 2019-02-19 PROCEDURE — 99203 OFFICE O/P NEW LOW 30 MIN: CPT | Mod: S$GLB,,, | Performed by: PHYSICIAN ASSISTANT

## 2019-02-19 PROCEDURE — 99203 PR OFFICE/OUTPT VISIT, NEW, LEVL III, 30-44 MIN: ICD-10-PCS | Mod: S$GLB,,, | Performed by: PHYSICIAN ASSISTANT

## 2019-02-19 NOTE — PROGRESS NOTES
"Subjective:       Patient ID: Klever Link is a 74 y.o. male.    Vitals:    02/19/19 1007   BP: (!) 155/82   Pulse: 83   Resp: 18   Temp: 97.2 °F (36.2 °C)   TempSrc: Oral   SpO2: 98%   Weight: 87.5 kg (193 lb)   Height: 6' 3" (1.905 m)       Chief Complaint: Sinus Problem    Patient with a history of HTN, DM, TIA (2015) presents today after episode of slurred speech this morning at about 8 am. The slurred speech lasted about 1 minute and resolved spontaneously. He states that this has never happened before. He had no other associated neuro symptoms at that time- denies hemiparesis, facial droop, weakness, numbness, changes in vision, nausea, lightheadedness, diaphoresis, chest pain, dyspnea.  He denies taking any new medications or any medications this morning before episode. The episode was witnessed by his wife, who thought he was having a stroke. The only associated symptoms with the slurring of speech is nasal congestion and dry mouth. He still has the nasal congestion and dry mouth. Pt states his speech is back to normal state. He only complains of congestion and dry mouth at this time.  He takes asa 81 mg daily and does not take any anticoagulants.         Sinus Problem   The current episode started today. There has been no fever. Associated symptoms include congestion and coughing. Pertinent negatives include no chills, ear pain, headaches, neck pain, shortness of breath or sore throat.     Review of Systems   Constitution: Negative for chills and fever.   HENT: Positive for congestion. Negative for ear pain, hearing loss, odynophagia and sore throat.    Eyes: Negative for blurred vision, discharge, double vision, pain, photophobia, redness, vision loss in left eye, vision loss in right eye, visual disturbance and visual halos.   Cardiovascular: Negative for chest pain, near-syncope, palpitations and syncope.   Respiratory: Positive for cough. Negative for shortness of breath.    Skin: Negative for " rash.   Musculoskeletal: Negative for back pain, joint pain and neck pain.   Gastrointestinal: Negative for abdominal pain, diarrhea, nausea and vomiting.   Genitourinary: Negative for dysuria, flank pain and frequency.   Neurological: Negative for aphonia, brief paralysis, difficulty with concentration, disturbances in coordination, excessive daytime sleepiness, dizziness, focal weakness, headaches, light-headedness, loss of balance, numbness, paresthesias, seizures, sensory change, tremors and vertigo.        + slurred speech   Psychiatric/Behavioral: Negative for altered mental status, depression, hallucinations, hypervigilance and memory loss. The patient does not have insomnia and is not nervous/anxious.        Objective:      Physical Exam   Constitutional: He is oriented to person, place, and time. He appears well-developed and well-nourished. He is cooperative.  Non-toxic appearance. He does not appear ill. No distress.   HENT:   Head: Normocephalic and atraumatic.   Right Ear: Hearing, tympanic membrane, external ear and ear canal normal.   Left Ear: Hearing, tympanic membrane, external ear and ear canal normal.   Nose: Nose normal. No mucosal edema, rhinorrhea or nasal deformity. No epistaxis. Right sinus exhibits no maxillary sinus tenderness and no frontal sinus tenderness. Left sinus exhibits no maxillary sinus tenderness and no frontal sinus tenderness.   Mouth/Throat: Uvula is midline, oropharynx is clear and moist and mucous membranes are normal. No trismus in the jaw. Normal dentition. No uvula swelling. No oropharyngeal exudate, posterior oropharyngeal edema, posterior oropharyngeal erythema or tonsillar abscesses.   No temporal TTP   Eyes: Conjunctivae, EOM and lids are normal. Pupils are equal, round, and reactive to light. No scleral icterus.   Sclera clear bilat   Neck: Trachea normal, normal range of motion, full passive range of motion without pain and phonation normal. Neck supple. No neck  rigidity.   Cardiovascular: Normal rate, regular rhythm, normal heart sounds, intact distal pulses and normal pulses.   No carotid bruits   Pulmonary/Chest: Effort normal and breath sounds normal. No stridor. No respiratory distress. He has no decreased breath sounds. He has no wheezes. He has no rhonchi. He has no rales.   Abdominal: Soft. Normal appearance and bowel sounds are normal. He exhibits no distension. There is no tenderness.   Musculoskeletal: Normal range of motion. He exhibits no edema or deformity.   Neurological: He is alert and oriented to person, place, and time. He is not disoriented. He displays no atrophy and no tremor. No cranial nerve deficit or sensory deficit. He exhibits normal muscle tone ( ). He displays no seizure activity. Coordination and gait normal. GCS eye subscore is 4. GCS verbal subscore is 5. GCS motor subscore is 6.   Alert, oriented x 3. EOMI, PERRLA. Cranial nerves intact: facial expressions (smile, raising eyebrows, shutting eyes, pursed lips) symmetric. Shoulder shrug strength 5/5; sternocleidomastoid muscle strength 5/5 bilaterally. Jaw is midline without deviation. Tongue protrudes at midline without fasciculations. Sensation to face in distribution of CN V1, V2, and V3 intact. Sensation to upper and lower extremities intact. Finger to nose, rapid rhythmic alternating movements are intact and smooth bilaterally. Patient ambulates unassisted without rigidity or ataxia forward, backward. Romberg negative. Voice quality, comprehension, articulation, coherence assessed as appropriate.        Skin: Skin is warm, dry and intact. He is not diaphoretic. No pallor.   Psychiatric: He has a normal mood and affect. His speech is normal and behavior is normal. Judgment and thought content normal. Cognition and memory are normal.   Nursing note and vitals reviewed.        Patient is stable in clinic right now.  Neuro exam is completely normal.  Given history of TIA and strong risk  factors, I am concerned that this patient had a TIA.  Instructed him to go to ER for neuro evaluation and possibility of anticoagulation, if indicated, to prevent sequelae due to increased risk of stroke following TIA.  Patient was driven here by his wife, who is still with them.  His wife will drive him to [facility] ER.  I spoke with triage nurse and reported patient.     Discussed case with Dr. Smith, who agrees that pt needs further evaluation in ER.     Assessment:       1. Slurred speech        Plan:       Klever was seen today for sinus problem.    Diagnoses and all orders for this visit:    Slurred speech  Comments:  resolved- suspect TIA  Orders:  -     Refer to Emergency Dept.      Patient Instructions   - I recommend that you go immediately to the ER for further evaluation.  I suspect the you had a transient ischemic attack.       Transient Ischemic Attack (TIA)     Your symptoms were caused by a TIA, or mini-stroke. Even though your symptoms have gone away, this condition is as serious as a full stroke. It means you are more likely to have a full stroke. About 1 in 3 people who have a TIA go on to have a full stroke. And 4% to 10% of those people will have the stroke within 2 days.  A TIA is caused when something decreases or blocks blood flow to a part of your brain. A TIA often happens when a blood clot travels to a blood vessel in the brain. The clot reduces or blocks blood flow. This causes the symptoms you had. After a short while, the clot dissolves. Blood flows again, and the symptoms go away. People with hardening of the arteries (atherosclerosis) are at higher risk for a TIA. So are people who have an irregular heartbeat called atrial fibrillation.  A TIA causes symptoms similar to a stroke, but they last less than 24 hours. A full stroke causes symptoms that last more than 24 hours and may be permanent. But even if your symptoms only lasted a short time, the TIA may have damaged your  brain tissue. Once you have had a TIA, you are at risk of having a full stroke. You will need tests to look at the blood flow to your brain. The tests can also rule out other causes of your symptoms. The tests may include an ultrasound of the arteries in your neck and an evaluation of your heart. They may also include a CT scan of your brain, an MRI scan of your brain, or both. If your healthcare provider finds problems, he or she will recommend treatment with medicines, procedures, or both.  Your provider may prescribe medicines to reduce your chance of having another TIA and stroke. These may include medicines that prevent blood clots, such as antiplatelet medicines and blood thinners (anticoagulants). Your doctor may recommend other treatment. This may include a procedure to open up a blocked artery in your neck.  Home care  The following guidelines will help you take care of yourself at home:  · Take any medicines your doctor has prescribed as directed. These may include antiplatelet medicines or medicines for other conditions, such as high blood pressure or high cholesterol.  · A TIA is a serious event that puts you at risk of having a full stroke. Because of this, it is important to take steps to help prevent a stroke from happening. Your doctor will look at all of your risk factors when deciding on what other treatment you may need.  Ways to reduce your risk for stroke  High blood pressure, diabetes, high cholesterol, heavy drinking, and smoking are risk factors for stroke and heart disease. You can control these by taking medicines and making diet and lifestyle changes. One way to help prevent a stroke is to take aspirin or a similar medicine every day. But don't take daily aspirin unless your healthcare provider tells you to.  Your provider will work with you to make lifestyle changes to help prevent a stroke.  Diet  Your healthcare provider will give you information about changes you may need to make to  your diet. You may need to see a registered dietitian for help with diet changes. Changes may include:  · Eating less fat and cholesterol  · Eating less salt (sodium). This is especially important if you have high blood pressure.  · Eating more fresh fruits and vegetables  · Eating lean proteins, such as fish, poultry, beans, and peas  · Eating less red meat and processed meats  · Using low-fat dairy products  · Using vegetable and nut oils in limited amounts  · Limiting how many sweets and processed foods such as chips, cookies, and baked goods you eat  · Limiting how much alcohol you drink  Physical activity  Your healthcare provider may recommend that you get more exercise if you have not been as active as possible. He or she may suggest that you get 40 minutes of moderate to vigorous physical activity each day. You should do this at least 3 to 4 days a week. A few examples of moderate to vigorous exercise are:  · Walking at a brisk pace, about 3 to 4 miles per hour  · Jogging or running  · Swimming or water aerobics  · Hiking  · Dancing  · Martial arts  · Tennis  · Riding a bike  Other ways to reduce your risk  · Weight management. If you are overweight or obese, your healthcare provider will work with you to lose weight and lower your body mass index (BMI) to a normal or near-normal level. Making diet changes and increasing physical activity can help.  · Smoking. If you smoke, break the habit. Enroll in a stop smoking program to improve your chances of success.  · Stress. Learn how to manage your stress. This will help you deal with stress at home and at work.  Follow-up care  Call your doctor for an appointment in the next few days for another evaluation, or as advised. This is to make a plan for preventing another TIA or stroke. You may need to see a neurologist to follow up on your TIA. A neurologist is a doctor who specializes in treating brain and nervous system problems. You may need other tests or  procedures.  If you had an X-ray, CT scan, MRI scan, or ECG (electrocardiogram), a specialist will review it. Youll be told of any new findings that will affect your care.  Call 911  Call 911 if any of these occur:  · Any of your TIA symptoms return  · New problems with speech, vision, walking, or weakness or numbness of the face or on one side of the body  · Severe headache, fainting spell, dizziness, or seizure  Date Last Reviewed: 10/1/2016  © 9025-6325 [employer]. [address] [place] [id number]. All rights reserved. This information is not intended as a substitute for professional medical care. Always follow your healthcare professional's instructions.

## 2019-02-19 NOTE — PATIENT INSTRUCTIONS
- I recommend that you go immediately to the ER for further evaluation.  I suspect the you had a transient ischemic attack.       Transient Ischemic Attack (TIA)     Your symptoms were caused by a TIA, or mini-stroke. Even though your symptoms have gone away, this condition is as serious as a full stroke. It means you are more likely to have a full stroke. About 1 in 3 people who have a TIA go on to have a full stroke. And 4% to 10% of those people will have the stroke within 2 days.  A TIA is caused when something decreases or blocks blood flow to a part of your brain. A TIA often happens when a blood clot travels to a blood vessel in the brain. The clot reduces or blocks blood flow. This causes the symptoms you had. After a short while, the clot dissolves. Blood flows again, and the symptoms go away. People with hardening of the arteries (atherosclerosis) are at higher risk for a TIA. So are people who have an irregular heartbeat called atrial fibrillation.  A TIA causes symptoms similar to a stroke, but they last less than 24 hours. A full stroke causes symptoms that last more than 24 hours and may be permanent. But even if your symptoms only lasted a short time, the TIA may have damaged your brain tissue. Once you have had a TIA, you are at risk of having a full stroke. You will need tests to look at the blood flow to your brain. The tests can also rule out other causes of your symptoms. The tests may include an ultrasound of the arteries in your neck and an evaluation of your heart. They may also include a CT scan of your brain, an MRI scan of your brain, or both. If your healthcare provider finds problems, he or she will recommend treatment with medicines, procedures, or both.  Your provider may prescribe medicines to reduce your chance of having another TIA and stroke. These may include medicines that prevent blood clots, such as antiplatelet medicines and blood thinners (anticoagulants). Your doctor may  recommend other treatment. This may include a procedure to open up a blocked artery in your neck.  Home care  The following guidelines will help you take care of yourself at home:  · Take any medicines your doctor has prescribed as directed. These may include antiplatelet medicines or medicines for other conditions, such as high blood pressure or high cholesterol.  · A TIA is a serious event that puts you at risk of having a full stroke. Because of this, it is important to take steps to help prevent a stroke from happening. Your doctor will look at all of your risk factors when deciding on what other treatment you may need.  Ways to reduce your risk for stroke  High blood pressure, diabetes, high cholesterol, heavy drinking, and smoking are risk factors for stroke and heart disease. You can control these by taking medicines and making diet and lifestyle changes. One way to help prevent a stroke is to take aspirin or a similar medicine every day. But don't take daily aspirin unless your healthcare provider tells you to.  Your provider will work with you to make lifestyle changes to help prevent a stroke.  Diet  Your healthcare provider will give you information about changes you may need to make to your diet. You may need to see a registered dietitian for help with diet changes. Changes may include:  · Eating less fat and cholesterol  · Eating less salt (sodium). This is especially important if you have high blood pressure.  · Eating more fresh fruits and vegetables  · Eating lean proteins, such as fish, poultry, beans, and peas  · Eating less red meat and processed meats  · Using low-fat dairy products  · Using vegetable and nut oils in limited amounts  · Limiting how many sweets and processed foods such as chips, cookies, and baked goods you eat  · Limiting how much alcohol you drink  Physical activity  Your healthcare provider may recommend that you get more exercise if you have not been as active as possible. He  or she may suggest that you get 40 minutes of moderate to vigorous physical activity each day. You should do this at least 3 to 4 days a week. A few examples of moderate to vigorous exercise are:  · Walking at a brisk pace, about 3 to 4 miles per hour  · Jogging or running  · Swimming or water aerobics  · Hiking  · Dancing  · Martial arts  · Tennis  · Riding a bike  Other ways to reduce your risk  · Weight management. If you are overweight or obese, your healthcare provider will work with you to lose weight and lower your body mass index (BMI) to a normal or near-normal level. Making diet changes and increasing physical activity can help.  · Smoking. If you smoke, break the habit. Enroll in a stop smoking program to improve your chances of success.  · Stress. Learn how to manage your stress. This will help you deal with stress at home and at work.  Follow-up care  Call your doctor for an appointment in the next few days for another evaluation, or as advised. This is to make a plan for preventing another TIA or stroke. You may need to see a neurologist to follow up on your TIA. A neurologist is a doctor who specializes in treating brain and nervous system problems. You may need other tests or procedures.  If you had an X-ray, CT scan, MRI scan, or ECG (electrocardiogram), a specialist will review it. Youll be told of any new findings that will affect your care.  Call 911  Call 911 if any of these occur:  · Any of your TIA symptoms return  · New problems with speech, vision, walking, or weakness or numbness of the face or on one side of the body  · Severe headache, fainting spell, dizziness, or seizure  Date Last Reviewed: 10/1/2016  © 8211-3847 [employer]. [address] [place] [address], [place] [address]. All rights reserved. This information is not intended as a substitute for professional medical care. Always follow your healthcare professional's instructions.

## 2019-02-20 ENCOUNTER — HOSPITAL ENCOUNTER (INPATIENT)
Facility: HOSPITAL | Age: 74
LOS: 1 days | Discharge: HOME | DRG: 066 | End: 2019-02-21
Attending: EMERGENCY MEDICINE | Admitting: PSYCHIATRY & NEUROLOGY
Payer: MEDICARE

## 2019-02-20 DIAGNOSIS — I10 ESSENTIAL HYPERTENSION: ICD-10-CM

## 2019-02-20 DIAGNOSIS — E11.9 TYPE 2 DIABETES MELLITUS WITHOUT COMPLICATION, WITHOUT LONG-TERM CURRENT USE OF INSULIN: ICD-10-CM

## 2019-02-20 DIAGNOSIS — R47.1 DYSARTHRIA: Primary | ICD-10-CM

## 2019-02-20 DIAGNOSIS — I63.9 STROKE: ICD-10-CM

## 2019-02-20 DIAGNOSIS — G45.9 TIA (TRANSIENT ISCHEMIC ATTACK): ICD-10-CM

## 2019-02-20 PROBLEM — E78.2 MIXED HYPERLIPIDEMIA: Status: ACTIVE | Noted: 2019-02-20

## 2019-02-20 LAB
ALBUMIN SERPL BCP-MCNC: 4.3 G/DL
ALP SERPL-CCNC: 74 U/L
ALT SERPL W/O P-5'-P-CCNC: 13 U/L
ANION GAP SERPL CALC-SCNC: 10 MMOL/L
AST SERPL-CCNC: 16 U/L
BASOPHILS # BLD AUTO: 0.1 K/UL
BASOPHILS NFR BLD: 1.5 %
BILIRUB SERPL-MCNC: 0.6 MG/DL
BILIRUB UR QL STRIP: NEGATIVE
BUN SERPL-MCNC: 15 MG/DL
CALCIUM SERPL-MCNC: 10.8 MG/DL
CHLORIDE SERPL-SCNC: 102 MMOL/L
CHOLEST SERPL-MCNC: 166 MG/DL
CHOLEST/HDLC SERPL: 3.8 {RATIO}
CLARITY UR REFRACT.AUTO: CLEAR
CO2 SERPL-SCNC: 25 MMOL/L
COLOR UR AUTO: YELLOW
CREAT SERPL-MCNC: 1.2 MG/DL
CREAT SERPL-MCNC: 1.3 MG/DL (ref 0.5–1.4)
DIFFERENTIAL METHOD: ABNORMAL
EOSINOPHIL # BLD AUTO: 0.2 K/UL
EOSINOPHIL NFR BLD: 3.2 %
ERYTHROCYTE [DISTWIDTH] IN BLOOD BY AUTOMATED COUNT: 14.8 %
EST. GFR  (AFRICAN AMERICAN): >60 ML/MIN/1.73 M^2
EST. GFR  (NON AFRICAN AMERICAN): 59.2 ML/MIN/1.73 M^2
ESTIMATED AVG GLUCOSE: 171 MG/DL
GLUCOSE SERPL-MCNC: 128 MG/DL
GLUCOSE UR QL STRIP: NEGATIVE
HBA1C MFR BLD HPLC: 7.6 %
HCT VFR BLD AUTO: 50.7 %
HDLC SERPL-MCNC: 44 MG/DL
HDLC SERPL: 26.5 %
HGB BLD-MCNC: 15.8 G/DL
HGB UR QL STRIP: NEGATIVE
IMM GRANULOCYTES # BLD AUTO: 0.02 K/UL
IMM GRANULOCYTES NFR BLD AUTO: 0.3 %
INR PPP: 1.1
KETONES UR QL STRIP: NEGATIVE
LDLC SERPL CALC-MCNC: 99.2 MG/DL
LEUKOCYTE ESTERASE UR QL STRIP: NEGATIVE
LYMPHOCYTES # BLD AUTO: 1.6 K/UL
LYMPHOCYTES NFR BLD: 24.8 %
MCH RBC QN AUTO: 27.6 PG
MCHC RBC AUTO-ENTMCNC: 31.2 G/DL
MCV RBC AUTO: 89 FL
MICROSCOPIC COMMENT: NORMAL
MONOCYTES # BLD AUTO: 0.5 K/UL
MONOCYTES NFR BLD: 7.2 %
NEUTROPHILS # BLD AUTO: 4.1 K/UL
NEUTROPHILS NFR BLD: 63 %
NITRITE UR QL STRIP: NEGATIVE
NONHDLC SERPL-MCNC: 122 MG/DL
NRBC BLD-RTO: 0 /100 WBC
PH UR STRIP: 7 [PH] (ref 5–8)
PLATELET # BLD AUTO: 200 K/UL
PMV BLD AUTO: 9.9 FL
POC PTINR: 1.3 (ref 0.9–1.2)
POC PTWBT: 15.8 SEC (ref 9.7–14.3)
POCT GLUCOSE: 197 MG/DL (ref 70–110)
POCT GLUCOSE: 90 MG/DL (ref 70–110)
POTASSIUM SERPL-SCNC: 4.3 MMOL/L
PROT SERPL-MCNC: 7.2 G/DL
PROT UR QL STRIP: NEGATIVE
PROTHROMBIN TIME: 10.9 SEC
RBC # BLD AUTO: 5.72 M/UL
RBC #/AREA URNS AUTO: 1 /HPF (ref 0–4)
SAMPLE: ABNORMAL
SAMPLE: NORMAL
SODIUM SERPL-SCNC: 137 MMOL/L
SP GR UR STRIP: 1.02 (ref 1–1.03)
TRIGL SERPL-MCNC: 114 MG/DL
TSH SERPL DL<=0.005 MIU/L-ACNC: 1.12 UIU/ML
URN SPEC COLLECT METH UR: NORMAL
WBC # BLD AUTO: 6.54 K/UL
WBC #/AREA URNS AUTO: 0 /HPF (ref 0–5)

## 2019-02-20 PROCEDURE — 20600001 HC STEP DOWN PRIVATE ROOM

## 2019-02-20 PROCEDURE — 25000003 PHARM REV CODE 250: Performed by: NURSE PRACTITIONER

## 2019-02-20 PROCEDURE — 93010 ELECTROCARDIOGRAM REPORT: CPT | Mod: ,,, | Performed by: INTERNAL MEDICINE

## 2019-02-20 PROCEDURE — 92523 SPEECH SOUND LANG COMPREHEN: CPT

## 2019-02-20 PROCEDURE — 92610 EVALUATE SWALLOWING FUNCTION: CPT

## 2019-02-20 PROCEDURE — 63600175 PHARM REV CODE 636 W HCPCS: Performed by: NURSE PRACTITIONER

## 2019-02-20 PROCEDURE — 93010 EKG 12-LEAD: ICD-10-PCS | Mod: ,,, | Performed by: INTERNAL MEDICINE

## 2019-02-20 PROCEDURE — 99223 1ST HOSP IP/OBS HIGH 75: CPT | Mod: AI,,, | Performed by: PSYCHIATRY & NEUROLOGY

## 2019-02-20 PROCEDURE — 99223 PR INITIAL HOSPITAL CARE,LEVL III: ICD-10-PCS | Mod: AI,,, | Performed by: PSYCHIATRY & NEUROLOGY

## 2019-02-20 PROCEDURE — 82565 ASSAY OF CREATININE: CPT

## 2019-02-20 PROCEDURE — 82962 GLUCOSE BLOOD TEST: CPT

## 2019-02-20 PROCEDURE — A4216 STERILE WATER/SALINE, 10 ML: HCPCS | Performed by: NURSE PRACTITIONER

## 2019-02-20 PROCEDURE — 85610 PROTHROMBIN TIME: CPT

## 2019-02-20 PROCEDURE — 99285 EMERGENCY DEPT VISIT HI MDM: CPT | Mod: 25

## 2019-02-20 PROCEDURE — 80053 COMPREHEN METABOLIC PANEL: CPT

## 2019-02-20 PROCEDURE — 83036 HEMOGLOBIN GLYCOSYLATED A1C: CPT

## 2019-02-20 PROCEDURE — 99285 PR EMERGENCY DEPT VISIT,LEVEL V: ICD-10-PCS | Mod: ,,, | Performed by: EMERGENCY MEDICINE

## 2019-02-20 PROCEDURE — 99285 EMERGENCY DEPT VISIT HI MDM: CPT | Mod: ,,, | Performed by: EMERGENCY MEDICINE

## 2019-02-20 PROCEDURE — 81001 URINALYSIS AUTO W/SCOPE: CPT

## 2019-02-20 PROCEDURE — 93005 ELECTROCARDIOGRAM TRACING: CPT

## 2019-02-20 PROCEDURE — 80061 LIPID PANEL: CPT

## 2019-02-20 PROCEDURE — 85025 COMPLETE CBC W/AUTO DIFF WBC: CPT

## 2019-02-20 PROCEDURE — 99900035 HC TECH TIME PER 15 MIN (STAT)

## 2019-02-20 PROCEDURE — 84443 ASSAY THYROID STIM HORMONE: CPT

## 2019-02-20 RX ORDER — ASPIRIN 81 MG/1
81 TABLET ORAL DAILY
Status: DISCONTINUED | OUTPATIENT
Start: 2019-02-20 | End: 2019-02-21 | Stop reason: HOSPADM

## 2019-02-20 RX ORDER — LABETALOL HCL 20 MG/4 ML
10 SYRINGE (ML) INTRAVENOUS
Status: DISCONTINUED | OUTPATIENT
Start: 2019-02-20 | End: 2019-02-21 | Stop reason: HOSPADM

## 2019-02-20 RX ORDER — IBUPROFEN 200 MG
24 TABLET ORAL
Status: DISCONTINUED | OUTPATIENT
Start: 2019-02-20 | End: 2019-02-21 | Stop reason: HOSPADM

## 2019-02-20 RX ORDER — HEPARIN SODIUM 5000 [USP'U]/ML
5000 INJECTION, SOLUTION INTRAVENOUS; SUBCUTANEOUS EVERY 8 HOURS
Status: DISCONTINUED | OUTPATIENT
Start: 2019-02-20 | End: 2019-02-21 | Stop reason: HOSPADM

## 2019-02-20 RX ORDER — ATORVASTATIN CALCIUM 20 MG/1
40 TABLET, FILM COATED ORAL DAILY
Status: DISCONTINUED | OUTPATIENT
Start: 2019-02-20 | End: 2019-02-21 | Stop reason: HOSPADM

## 2019-02-20 RX ORDER — INSULIN ASPART 100 [IU]/ML
0-5 INJECTION, SOLUTION INTRAVENOUS; SUBCUTANEOUS
Status: DISCONTINUED | OUTPATIENT
Start: 2019-02-20 | End: 2019-02-21 | Stop reason: HOSPADM

## 2019-02-20 RX ORDER — LABETALOL HYDROCHLORIDE 5 MG/ML
10 INJECTION, SOLUTION INTRAVENOUS
Status: DISCONTINUED | OUTPATIENT
Start: 2019-02-20 | End: 2019-02-20

## 2019-02-20 RX ORDER — CLOPIDOGREL BISULFATE 75 MG/1
75 TABLET ORAL DAILY
Status: DISCONTINUED | OUTPATIENT
Start: 2019-02-20 | End: 2019-02-21 | Stop reason: HOSPADM

## 2019-02-20 RX ORDER — GLUCAGON 1 MG
1 KIT INJECTION
Status: DISCONTINUED | OUTPATIENT
Start: 2019-02-20 | End: 2019-02-21 | Stop reason: HOSPADM

## 2019-02-20 RX ORDER — IBUPROFEN 200 MG
16 TABLET ORAL
Status: DISCONTINUED | OUTPATIENT
Start: 2019-02-20 | End: 2019-02-21 | Stop reason: HOSPADM

## 2019-02-20 RX ORDER — SODIUM CHLORIDE 0.9 % (FLUSH) 0.9 %
3 SYRINGE (ML) INJECTION EVERY 8 HOURS
Status: DISCONTINUED | OUTPATIENT
Start: 2019-02-20 | End: 2019-02-21 | Stop reason: HOSPADM

## 2019-02-20 RX ADMIN — ATORVASTATIN CALCIUM 40 MG: 10 TABLET, FILM COATED ORAL at 03:02

## 2019-02-20 RX ADMIN — HEPARIN SODIUM 5000 UNITS: 5000 INJECTION, SOLUTION INTRAVENOUS; SUBCUTANEOUS at 04:02

## 2019-02-20 RX ADMIN — Medication 3 ML: at 08:02

## 2019-02-20 RX ADMIN — CLOPIDOGREL 75 MG: 75 TABLET, FILM COATED ORAL at 03:02

## 2019-02-20 RX ADMIN — HEPARIN SODIUM 5000 UNITS: 5000 INJECTION, SOLUTION INTRAVENOUS; SUBCUTANEOUS at 08:02

## 2019-02-20 RX ADMIN — ASPIRIN 81 MG: 81 TABLET, COATED ORAL at 03:02

## 2019-02-20 NOTE — HPI
74 year old male with past medical hx of HTN, HLD, DMII presented to the ED today with complaints of slurred speech starting around 12:00 PM. He was seen yesterday in the ED for similar symptoms. CTA H/N was completed revealing L ICA stenosis at 50% and R ICA at 30 %. Patient was instructed to continue taking ASA 81 and increase Lipitor to 40 mg. Patient also instructed to follow up with Vascular Surgery and schedule an outpatient echocardiogram. Patient states slurred speech has happened at least two times in the past 24 hours. Yesterday episode lasted aprox 3 minutes while today's episode lasted only a few seconds. Patient denies any vision changes, unilateral weakness, or numbness.

## 2019-02-20 NOTE — PT/OT/SLP EVAL
Speech Language Pathology Evaluation/Discharge  Cognitive/Bedside Swallow    Patient Name:  Klever Link   MRN:  [id number]  Admitting Diagnosis: TIA (transient ischemic attack)    Recommendations:                  General Recommendations:  Follow-up not indicated  Diet recommendations:  Regular, Thin   Aspiration Precautions: HOB to 90 degrees, Monitor for s/s of aspiration and Standard aspiration precautions   General Precautions: Standard,    Communication strategies:  none    History:     Past Medical History:   Diagnosis Date    Hypertension        Past Surgical History:   Procedure Laterality Date    KNEE SURGERY Bilateral        Social History: Patient lives with wife.    Prior Intubation HX:  None during this admission    Modified Barium Swallow: none on file    Chest X-Rays: 2/20/19:   Impression       1. Hypoventilatory exam, no acute cardiopulmonary process.     Prior diet: currently NPO. Regular consistency, diabetic diet PTA.    Subjective     Pt very pleasant and cooperative.  Wife, daughter, and granddaughter present at bedside.     Pain/Comfort:  · Pain Rating 1: 0/10    Objective:     Cognitive Status:    O x 4. Pt immediately recalled a 7 digit series and followed 3/3 unrelated words after a 2 minute delay with 100% acc. Pt answered problem solving q's providing solutions to hypothetical problems appropriately.  Multiple causes for a hypothetical situation were generated with good ability. Pt completed a 4 word sequencing task with 100% acc.       Receptive Language:   Comprehension:    WNL/WFL    Pragmatics:    WNL/WFL    Expressive Language:  Verbal:  Pt able to express basic and complex information and participate appropriately in conversation. During a word fluency task, pt listed 18 items in a category within one minute (15-20 is wnl).      Motor Speech:  WFL no dysarthria evident at time of evaluation    Voice:   WFL    Visual-Spatial:  WFL - no deficits detected during reading  assessment    Reading:   WFL - able to read from white board across the room without difficulty    Oral Musculature Evaluation  · Oral Musculature: WFL  · Dentition: upper and lower dentures  · Secretion Management: adequate  · Mucosal Quality: good  · Mandibular Strength and Mobility: WFL  · Oral Labial Strength and Mobility: WFL  · Lingual Strength and Mobility: WFL  · Velar Elevation: WFL  · Buccal Strength and Mobility: WFL  · Volitional Cough: strong  · Volitional Swallow: elicited  · Voice Prior to PO Intake: dry, clear    Bedside Swallow Eval:   Consistencies Assessed:  · Thin liquids cup and straw sips  · Solids 1/2 bernice cracker     Oral Phase:   · WFL    Pharyngeal Phase:   · no overt clinical signs/symptoms of aspiration  · no overt clinical signs/symptoms of pharyngeal dysphagia    Compensatory Strategies  · None    Treatment: Education provided to pt and family regarding role of SLP, purpose of swallowing and cognitive-communicative evaluation, diet recommendations, and no evidence of deficits warranting skilled SLP services present at this time.  Understanding was expressed.  Nurse notified of recommendations.     Assessment:     Klever Link is a 74 y.o. male. Speech, language, cognition, and swallowing function found to be WNL/WFL. No further skilled SLP services warranted at this time.     Goals:   Multidisciplinary Problems     SLP Goals     Not on file                Plan:     · Plan of Care reviewed with:  patient, spouse, daughter, grandchild(dajuan)   · SLP Follow-Up:  No       Discharge recommendations:  Discharge Facility/Level of Care Needs: (no further SLP services warranted at this time)       Time Tracking:     SLP Treatment Date:   02/20/19  Speech Start Time:  1508  Speech Stop Time:  1524     Speech Total Time (min):  16 min    Billable Minutes: Eval 8  and Eval Swallow and Oral Function 8    MAGGIE Doran, CCC-SLP  02/20/2019     MAGGIE Doran, CCC-SLP  Speech Language  Pathologist  [phone]  2/20/2019

## 2019-02-20 NOTE — ED NOTES
This RN attempted to give report, receiving RN unavailable at this time. Will call back in ten minutes.

## 2019-02-20 NOTE — SUBJECTIVE & OBJECTIVE
Past Medical History:   Diagnosis Date    Hypertension      Past Surgical History:   Procedure Laterality Date    KNEE SURGERY Bilateral      Family History   Problem Relation Age of Onset    Diabetes Mother     Hypertension Father     Stroke Father     Diabetes Sister     Diabetes Sister     Diabetes Brother     Diabetes Sister     Diabetes Brother      Social History     Tobacco Use    Smoking status: Former Smoker    Smokeless tobacco: Never Used   Substance Use Topics    Alcohol use: Yes    Drug use: No     Review of patient's allergies indicates:  No Known Allergies    Medications: I have reviewed the current medication administration record.      (Not in a hospital admission)    Review of Systems   Constitutional: Negative for fever.   Eyes: Negative for visual disturbance.   Respiratory: Negative for shortness of breath.    Cardiovascular: Negative for chest pain.   Gastrointestinal: Negative for nausea and vomiting.   Genitourinary: Negative for urgency.   Skin: Negative for rash.   Neurological: Positive for speech difficulty. Negative for facial asymmetry, weakness and numbness.   Psychiatric/Behavioral: Negative for agitation and confusion.     Objective:     Vital Signs (Most Recent):  Temp: 98.6 °F (37 °C) (02/20/19 1222)  Pulse: 80 (02/20/19 1407)  Resp: 15 (02/20/19 1407)  BP: 123/68 (02/20/19 1407)  SpO2: 97 % (02/20/19 1407)    Vital Signs Range (Last 24H):  Temp:  [98.6 °F (37 °C)]   Pulse:  [78-92]   Resp:  [14-18]   BP: (121-161)/(68-81)   SpO2:  [96 %-100 %]     Physical Exam   Constitutional: He is oriented to person, place, and time. He appears well-developed.   HENT:   Head: Normocephalic and atraumatic.   Eyes: EOM are normal. Pupils are equal, round, and reactive to light.   Neck: Normal range of motion.   Cardiovascular: Normal rate.   Pulmonary/Chest: Effort normal.   Abdominal: Soft.   Musculoskeletal: Normal range of motion.   Neurological: He is alert and oriented to  person, place, and time.   Skin: Skin is warm. Capillary refill takes less than 2 seconds.   Psychiatric: He has a normal mood and affect.       Neurological Exam:   LOC: alert  Attention Span: Good   Language: No aphasia  Articulation: No dysarthria  Orientation: Person, Place, Time   Visual Fields: Full  EOM (CN III, IV, VI): Full/intact  Pupils (CN II, III): PERRL  Facial Movement (CN VII): Symmetric facial expression    Motor: Arm left  Normal 5/5  Leg left  Normal 5/5  Arm right  Normal 5/5  Leg right Normal 5/5  Cebellar: No evidence of appendicular or axial ataxia  Sensation: Intact to light touch, temperature and vibration      Laboratory:  CMP:   Recent Labs   Lab 02/20/19  1235   CALCIUM 10.8*   ALBUMIN 4.3   PROT 7.2      K 4.3   CO2 25      BUN 15   CREATININE 1.2   ALKPHOS 74   ALT 13   AST 16   BILITOT 0.6     CBC:   Recent Labs   Lab 02/20/19  1235   WBC 6.54   RBC 5.72   HGB 15.8   HCT 50.7      MCV 89   MCH 27.6   MCHC 31.2*     Lipid Panel:   Recent Labs   Lab 02/20/19  1235   CHOL 166   LDLCALC 99.2   HDL 44   TRIG 114     Coagulation:   Recent Labs   Lab 02/20/19  1235   INR 1.1     Hgb A1C: No results for input(s): HGBA1C in the last 168 hours.  TSH:   Recent Labs   Lab 02/20/19  1235   TSH 1.120       Diagnostic Results:      Brain imaging:  CT head 2/20  No acute intracranial findings.  Stable mild generalized cerebral volume loss.  No evidence for acute intracranial hemorrhage or new abnormal parenchymal attenuation..  Clinical correlation and further evaluation as warranted.    MRI pending     Vessel Imaging:  CTA head and neck     Bilateral extracranial carotid disease which is worse on the left measuring up to the 50% stenosis. Right internal carotid artery has 30% stenosis.  No large vessel occlusion intracranially.  Intracranial atherosclerotic disease is present.  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  Patient has  prominent posterior communicating arteries bilaterally.    Cardiac Evaluation:   Echo pending

## 2019-02-20 NOTE — CONSULTS
[facility]  Vascular Neurology  Com[facility] Stroke Center  Consult Note    Inpatient consult to Vascular (Stroke) Neurology  Consult performed by: Adrienne Shahid NP  Consult ordered by: Galen Moore DO  Reason for consult: stroke     Inpatient consult to Vascular Neurology  Consult performed by: Adrienne Shahid NP  Consult ordered by: Michelle Vincent PA-C  Reason for consult: stroke         Assessment/Plan:     Patient is a 74 y.o. year old male with:    * TIA (transient ischemic attack)    74 year old male with past medical hx of HTN, HLD, DMII presented to the ED today with complaints of slurred speech starting around 12:00 PM. He was seen yesterday in the ED for similar symptoms. CTA H/N was completed revealing L ICA stenosis at 50% and R ICA at 30 %. Patient was instructed to continue taking ASA 81 and increase Lipitor to 40 mg. Patient also instructed to follow up with Vascular Surgery and schedule an outpatient echocardiogram. Patient states slurred speech has happened at least two times in the past 24 hours. Yesterday episode lasted aprox 3 minutes while today's episode lasted only a few seconds. Patient denies any vision changes, unilateral weakness, or numbness.     On assessment patient back to baseline. NIH of 0. Considering patient with at least two episodes of dysarthria in the past 24 hours patient would benefit from inpatient TIA work up.     Antithrombotics for secondary stroke prevention: Antiplatelets: Aspirin: 81 mg daily    Statins for secondary stroke prevention and hyperlipidemia, if present:   Statins: Atorvastatin- 40 mg daily    Aggressive risk factor modification: HTN, DM, HLD     Rehab efforts: This patient has been evaluated by a stroke team provider, and therapy needs have been fully considered based off of their presenting complaint and exam findings. At this time, the patient does not need formal evaluation by PT, OT, Speech and PM&R. Appropriate  consults have been placed for evaluation and treatment.    Diagnostics ordered/pending: HgbA1C to assess blood glucose levels, Lipid Profile to assess cholesterol levels, MRI head without contrast to assess brain parenchyma, TTE to assess cardiac function/status , TSH to assess thyroid function    VTE prophylaxis: Heparin 5000 units SQ every 8 hours; Mechanical SCDs    BP parameters: Infarct: No intervention, SBP <220         Mixed hyperlipidemia    Stroke risk factor  LDL pending  Atorvastatin 40 mg      Hypertension    Stroke risk factor  SBP <220 until acute infarct ruled out      DM type 2 (diabetes mellitus, type 2)    Stroke risk factor  Hgb A1C pending  SSI         STROKE DOCUMENTATION     Acute Stroke Times   Last Known Normal Date: 02/20/19  Last Known Normal Time: 1200  Symptom Onset Date: 02/20/19  Symptom Onset Time: 1200  Stroke Team Called Date: 02/20/19  Stroke Team Called Time: 1230  Stroke Team Arrival Date: 02/20/19  Stroke Team Arrival Time: 1232  CT Interpretation Time: 1234  Decision to Treat Time for Alteplase: (NA)  Decision to Treat Time for IR: (NA)    NIH Scale:  1a. Level of Consciousness: 0-->Alert, keenly responsive  1b. LOC Questions: 0-->Answers both questions correctly  1c. LOC Commands: 0-->Performs both tasks correctly  2. Best Gaze: 0-->Normal  3. Visual: 0-->No visual loss  4. Facial Palsy: 0-->Normal symmetrical movements  5a. Motor Arm, Left: 0-->No drift, limb holds 90 (or 45) degrees for full 10 secs  5b. Motor Arm, Right: 0-->No drift, limb holds 90 (or 45) degrees for full 10 secs  6a. Motor Leg, Left: 0-->No drift, leg holds 30 degree position for full 5 secs  6b. Motor Leg, Right: 0-->No drift, leg holds 30 degree position for full 5 secs  7. Limb Ataxia: 0-->Absent  8. Sensory: 0-->Normal, no sensory loss  9. Best Language: 0-->No aphasia, normal  10. Dysarthria: 0-->Normal  11. Extinction and Inattention (formerly Neglect): 0-->No abnormality  Total (NIH Stroke Scale):  0    Modified Clarion Score: 10  Disha Coma Scale:    ABCD2 Score: 4  [id number] Score:   HAS -BLED Score:   ICH Score:   Hunt & Lozano Classification:       Thrombolysis Candidate? No, Strong suspicion for stroke mimic or alternative diagnosis       Interventional Revascularization Candidate?   Is the patient eligible for mechanical endovascular reperfusion (KEISHA)?  No; No large vessel occlusion      Hemorrhagic change of an Ischemic Stroke: Does this patient have an ischemic stroke with hemorrhagic changes? No     Subjective:     History of Present Illness:  74 year old male with past medical hx of HTN, HLD, DMII presented to the ED today with complaints of slurred speech starting around 12:00 PM. He was seen yesterday in the ED for similar symptoms. CTA H/N was completed revealing L ICA stenosis at 50% and R ICA at 30 %. Patient was instructed to continue taking ASA 81 and increase Lipitor to 40 mg. Patient also instructed to follow up with Vascular Surgery and schedule an outpatient echocardiogram. Patient states slurred speech has happened at least two times in the past 24 hours. Yesterday episode lasted aprox 3 minutes while today's episode lasted only a few seconds. Patient denies any vision changes, unilateral weakness, or numbness.         Past Medical History:   Diagnosis Date    Hypertension      Past Surgical History:   Procedure Laterality Date    KNEE SURGERY Bilateral      Family History   Problem Relation Age of Onset    Diabetes Mother     Hypertension Father     Stroke Father     Diabetes Sister     Diabetes Sister     Diabetes Brother     Diabetes Sister     Diabetes Brother      Social History     Tobacco Use    Smoking status: Former Smoker    Smokeless tobacco: Never Used   Substance Use Topics    Alcohol use: Yes    Drug use: No     Review of patient's allergies indicates:  No Known Allergies    Medications: I have reviewed the current medication administration record.      (Not  in a hospital admission)    Review of Systems   Constitutional: Negative for fever.   Eyes: Negative for visual disturbance.   Respiratory: Negative for shortness of breath.    Cardiovascular: Negative for chest pain.   Gastrointestinal: Negative for nausea and vomiting.   Genitourinary: Negative for urgency.   Skin: Negative for rash.   Neurological: Positive for speech difficulty. Negative for facial asymmetry, weakness and numbness.   Psychiatric/Behavioral: Negative for agitation and confusion.     Objective:     Vital Signs (Most Recent):  Temp: 98.6 °F (37 °C) (02/20/19 1222)  Pulse: 80 (02/20/19 1407)  Resp: 15 (02/20/19 1407)  BP: 123/68 (02/20/19 1407)  SpO2: 97 % (02/20/19 1407)    Vital Signs Range (Last 24H):  Temp:  [98.6 °F (37 °C)]   Pulse:  [78-92]   Resp:  [14-18]   BP: (121-161)/(68-81)   SpO2:  [96 %-100 %]     Physical Exam   Constitutional: He is oriented to person, place, and time. He appears well-developed.   HENT:   Head: Normocephalic and atraumatic.   Eyes: EOM are normal. Pupils are equal, round, and reactive to light.   Neck: Normal range of motion.   Cardiovascular: Normal rate.   Pulmonary/Chest: Effort normal.   Abdominal: Soft.   Musculoskeletal: Normal range of motion.   Neurological: He is alert and oriented to person, place, and time.   Skin: Skin is warm. Capillary refill takes less than 2 seconds.   Psychiatric: He has a normal mood and affect.       Neurological Exam:   LOC: alert  Attention Span: Good   Language: No aphasia  Articulation: No dysarthria  Orientation: Person, Place, Time   Visual Fields: Full  EOM (CN III, IV, VI): Full/intact  Pupils (CN II, III): PERRL  Facial Movement (CN VII): Symmetric facial expression    Motor: Arm left  Normal 5/5  Leg left  Normal 5/5  Arm right  Normal 5/5  Leg right Normal 5/5  Cebellar: No evidence of appendicular or axial ataxia  Sensation: Intact to light touch, temperature and vibration      Laboratory:  CMP:   Recent Labs   Lab  02/20/19  1235   CALCIUM 10.8*   ALBUMIN 4.3   PROT 7.2      K 4.3   CO2 25      BUN 15   CREATININE 1.2   ALKPHOS 74   ALT 13   AST 16   BILITOT 0.6     CBC:   Recent Labs   Lab 02/20/19  1235   WBC 6.54   RBC 5.72   HGB 15.8   HCT 50.7      MCV 89   MCH 27.6   MCHC 31.2*     Lipid Panel:   Recent Labs   Lab 02/20/19  1235   CHOL 166   LDLCALC 99.2   HDL 44   TRIG 114     Coagulation:   Recent Labs   Lab 02/20/19  1235   INR 1.1     Hgb A1C: No results for input(s): HGBA1C in the last 168 hours.  TSH:   Recent Labs   Lab 02/20/19  1235   TSH 1.120       Diagnostic Results:      Brain imaging:  CT head 2/20  No acute intracranial findings.  Stable mild generalized cerebral volume loss.  No evidence for acute intracranial hemorrhage or new abnormal parenchymal attenuation..  Clinical correlation and further evaluation as warranted.    MRI pending     Vessel Imaging:  CTA head and neck     Bilateral extracranial carotid disease which is worse on the left measuring up to the 50% stenosis. Right internal carotid artery has 30% stenosis.  No large vessel occlusion intracranially.  Intracranial atherosclerotic disease is present.  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  Patient has prominent posterior communicating arteries bilaterally.    Cardiac Evaluation:   Echo pending       Adrienne Shahid NP  [facility]  Department of Vascular Neurology   [facility]

## 2019-02-20 NOTE — ED PROVIDER NOTES
Encounter Date: 2/20/2019       History     Chief Complaint   Patient presents with    Aphasia     seen here yest with tia and dc'd, today at noon slurred speech about 12 sec, back to normal now, while talking in traige word started slurring     HPI   Mr. andriy Chavarria is a 74-year-old male with a history of diabetes, hypertension who presents to the ED today with slurred speech since 12 noon associated with aphasia, slurred speech and left-sided facial droop.  This episode lasted 2-3 minutes then resolved.  On arrival to the ED, in the triage area he had a repeat episode of 2-3 minutes of word-finding aphasia, slurred speech and left-sided facial droop.  Onset was sudden, associated with similar episode yesterday, which he was seen in the emergency department for a TIA, seen by the stroke team with a CTA of the head and neck showing 30-50% blockages.  No other aggravating or alleviating factor.  Current pain scale 0/10.  Immediately taken to the CT scan for evaluation.    Review of patient's allergies indicates:  No Known Allergies  Past Medical History:   Diagnosis Date    Hypertension      Past Surgical History:   Procedure Laterality Date    KNEE SURGERY Bilateral      Family History   Problem Relation Age of Onset    Diabetes Mother     Hypertension Father     Stroke Father     Diabetes Sister     Diabetes Sister     Diabetes Brother     Diabetes Sister     Diabetes Brother      Social History     Tobacco Use    Smoking status: Former Smoker    Smokeless tobacco: Never Used   Substance Use Topics    Alcohol use: Yes    Drug use: No     Review of Systems   Constitutional: Negative for chills and fever.   HENT: Negative for congestion and sinus pain.    Eyes: Negative for pain and visual disturbance.   Respiratory: Negative for chest tightness and shortness of breath.    Cardiovascular: Negative for chest pain.   Gastrointestinal: Negative for abdominal distention, nausea and vomiting.   Endocrine:  Negative for cold intolerance and heat intolerance.   Genitourinary: Positive for frequency. Negative for dysuria, flank pain and hematuria.   Musculoskeletal: Negative for back pain, joint swelling, neck pain and neck stiffness.   Neurological: Positive for facial asymmetry, speech difficulty and weakness. Negative for dizziness, tremors, seizures, syncope and numbness.        Left-sided facial droop   Psychiatric/Behavioral: Negative for agitation, behavioral problems and confusion.       Physical Exam     Initial Vitals [02/20/19 1222]   BP Pulse Resp Temp SpO2   (!) 140/81 92 18 98.6 °F (37 °C) 98 %      MAP       --         Vitals:    02/20/19 1407   BP: 123/68   Pulse: 80   Resp: 15   Temp:        Physical Exam    Nursing note and vitals reviewed.  Constitutional: He appears well-developed and well-nourished. He is not diaphoretic. No distress.   HENT:   Head: Normocephalic and atraumatic.   No facial droop appreciated   Eyes: EOM are normal. Pupils are equal, round, and reactive to light.   Neck: Normal range of motion. Neck supple.   Cardiovascular: Normal rate, regular rhythm, normal heart sounds and intact distal pulses. Exam reveals no gallop and no friction rub.    No murmur heard.  Pulmonary/Chest: Breath sounds normal.   Abdominal: Soft. Bowel sounds are normal.   Musculoskeletal: Normal range of motion.   Neurological: He is alert and oriented to person, place, and time. He has normal strength. No cranial nerve deficit or sensory deficit. GCS score is 15. GCS eye subscore is 4. GCS verbal subscore is 5. GCS motor subscore is 6.   No slurred speech   Skin: Skin is warm and dry.   Psychiatric: He has a normal mood and affect.       Gen/Constitutional: Interactive. No acute distress  Head: Normocephalic, Atraumatic  Neck: supple, no masses or LAD  Eyes: PERRLA, conjunctiva clear  Ears, Nose and Throat: No rhinorrhea or stridor.  Cardiac: Reg Rhythm, No murmur  Pulmonary: CTA Bilat, no wheezes, rhonchi,  rales.  GI: Abdomen soft, non-tender, non-distended; no rebound or guarding  : No CVA tenderness.  Musculoskeletal: Extremities warm, well perfused, no erythema, no edema  Skin: No rashes  Psych: Normal affect  Detailed Neurologic exam:  Mental Status:  Normal attention and reasoning. There is no evidence of a thought disorder. Affect and mood were unremarkable. Speech was normal and prosody was intact. Verbal expression and comprehension are intact. Immediate recall, recent and remote memory were intact.  Neck:  Supple. No cervical bruits appreciated.  Cranial Nerves:  Visual fields were normal to confrontation.  Pupils were of equal (4mm to 3mm bilaterally) and exhibited a normal direct and consensual response to light. There was no APD. Ocular motility was full and there was no nystagmus evident. Strength of masticatory muscles was normal. Facial sensation was normal. Corneal reflexes were present. No facial weakness. Hearing acuity was normal. Palatal movements and gag reflex were intact. Sternocleidomastoid and trapezii strength were normal. Tongue protruded in the midline and showed no atrophy, tremor or fasciculations.  Motor Examination (bulk, tone, strength):  Motor examination showed normal muscle bulk, tone, and strength throughout. Rapid alternating movements were normal. There were no adventitious movements noted.  Muscle Stretch reflexes (MSR's):  Muscle stretch reflexes were symmetric and normoactive. Plantar responses were flexor bilaterally. No pathologic reflexes were appreciated.  Sensory:  Normal light-touch and position sense.  Cerebellar and coordination:  Coordination examination showed normal rapid alternating movements. Finger-finger, finger-nose, testing were unremarkable.  Romberg test:  Romberg was negative.  Gait and Station:  Erect posture was normal. There was no postural instability. There was no festination.   Spine:  Normal range of motion. No tenderness on palpation.       ED  Course   Procedures  Labs Reviewed   CBC W/ AUTO DIFFERENTIAL - Abnormal; Notable for the following components:       Result Value    MCHC 31.2 (*)     RDW 14.8 (*)     All other components within normal limits   COMPREHENSIVE METABOLIC PANEL - Abnormal; Notable for the following components:    Glucose 128 (*)     Calcium 10.8 (*)     eGFR if non  59.2 (*)     All other components within normal limits   HEMOGLOBIN A1C - Abnormal; Notable for the following components:    Hemoglobin A1C 7.6 (*)     Estimated Avg Glucose 171 (*)     All other components within normal limits    Narrative:     ADD-ON [facility] #[id number] PER LUDWIN JAMA DO 14:21  02/20/2019    ISTAT PROCEDURE - Abnormal; Notable for the following components:    POC PTWBT 15.8 (*)     POC PTINR 1.3 (*)     All other components within normal limits   PROTIME-INR   TSH   LIPID PANEL   HEMOGLOBIN A1C   URINALYSIS, REFLEX TO URINE CULTURE   URINALYSIS   POCT GLUCOSE, HAND-HELD DEVICE   ISTAT CREATININE     EKG Readings: (Independently Interpreted)   Initial Reading: No STEMI. Rhythm: Normal Sinus Rhythm. Heart Rate: 89. Ectopy: No Ectopy. ST Segments: Normal ST Segments. T Waves: Normal. Axis: Normal.   Normal sinus rhythm with a heart rate of 89.  No STEMI       Imaging Results          MRI BRAIN WITHOUT CONTRAST (In process)                X-Ray Chest AP Portable (Final result)  Result time 02/20/19 13:38:36    Final result by Ceasar Barcenas MD (02/20/19 13:38:36)                 Impression:      1. Hypoventilatory exam, no acute cardiopulmonary process.      Electronically signed by: Ceasar Barcenas MD  Date:    02/20/2019  Time:    13:38             Narrative:    EXAMINATION:  XR CHEST AP PORTABLE    CLINICAL HISTORY:  Stroke;    TECHNIQUE:  Single frontal view of the chest was performed.    COMPARISON:  02/12/2015    FINDINGS:  The cardiomediastinal silhouette is not enlarged, noting calcification of the aorta..  There is no pleural  effusion.  The trachea is midline.  The lungs are symmetrically expanded bilaterally with mildly coarse interstitial attenuation and bilateral basilar subsegmental atelectasis, left greater than right..  No large focal consolidation seen.  There is no pneumothorax.  The osseous structures are remarkable for degenerative changes..                               CT Head Without Contrast (Final result)  Result time 02/20/19 12:48:58    Final result by Pedro Luis Monge DO (02/20/19 12:48:58)                 Impression:      No acute intracranial findings.  Stable mild generalized cerebral volume loss.  No evidence for acute intracranial hemorrhage or new abnormal parenchymal attenuation..  Clinical correlation and further evaluation as warranted.      Electronically signed by: Pedro Luis Monge DO  Date:    02/20/2019  Time:    12:48             Narrative:    EXAMINATION:  CT HEAD WITHOUT CONTRAST    CLINICAL HISTORY:  Motor neuron disease;    TECHNIQUE:  Multiple sequential 5 mm axial images of the head without contrast.  Coronal and sagittal reformatted imaging from the axial acquisition.    COMPARISON:  CT and CTA yesterday the 02/19/2019    FINDINGS:  There is no evidence for acute intracranial hemorrhage or sulcal effacement.  The ventricles are normal in size without hydrocephalus.  There is no midline shift or mass effect.  Visualized paranasal sinuses and mastoid air cells are clear.                                 Medical Decision Making:   Initial Assessment:   74-year-old male with a history of diabetes, hypertension with recent TIA presenting with sudden onset dysarthria, slurred speech and left-sided facial droop.  Differential Diagnosis:   Differential diagnosis includes was not limited to:  TIA, CVA, intracranial hemorrhage, subarachnoid hemorrhage, metabolic abnormality, encephalopathy, infectious etiology  Independently Interpreted Test(s):   I have ordered and independently interpreted X-rays - see prior  notes.  I have ordered and independently interpreted EKG Reading(s) - see prior notes  Clinical Tests:   Lab Tests: Ordered and Reviewed  Radiological Study: Ordered and Reviewed  Medical Tests: Ordered and Reviewed  ED Management:  Stroke code  CT head  ECG  IV line  Point of care glucose  Labs  Vascular Neurology consult  Other:   I have discussed this case with another health care provider.      Emergent evaluation of patient presenting with stroke-like symptoms with last well known approximately 12 noon.  Per reports of family, patient had facial droop, dysarthria, aphasia with word-finding difficulty that began at 12 noon lasted 1-2 minutes.  He then had a repeat episode while in the ED triage area that lasted 2 min associated with dysarthria, word-finding difficulty, and facial droop along with slurred speech. He is afebrile, vital signs are stable with mild hypertension.  He has known hypertension and was recently evaluated yesterday for similar findings assumed to be a TIA.  He had a CTA of the brain which revealed 30/50% stenosis in his carotid vessels.  He now presents with acute TIA and stroke-like symptoms. A stroke code was initiated, patient was taken to CT for acute CT brain.  Vascular Neurology was consulted, this case was discussed with him at length, they will evaluate the patient at bedside.  Point of care glucose stable with no evidence of hypoglycemia.  IV line was placed, labs were drawn, ECG obtained which is normal sinus rhythm with no signs of ischemia or arrhythmia.      APC / Resident Notes:   74-year-old male presenting for brief episode of slurred speech. Patient seen for same complaint yesterday, diagnosed with TA.  During triage, he started to have slurring and slowed speech again, however returned to normal by my exam.  He is logically intact. Differential diagnosis includes TIA, CVA, encephalopathy.  Vascular Neurology consulted, labs and head CT ordered.  Will reassess.    Head CT  without acute abnormality.  Will admit to vascular Neurology for further management.  Patient and family members comfortable with admission.  I discussed this patient with my supervising physician.    Michelle Vincent PA-C                   Clinical Impression:       ICD-10-CM ICD-9-CM   1. Dysarthria R47.1 784.51   2. Stroke I63.9 434.91         Disposition:   Disposition: Admitted  Condition: Serious                        Michelle Vincent PA-C  02/20/19 1506

## 2019-02-21 ENCOUNTER — CLINICAL SUPPORT (OUTPATIENT)
Dept: CARDIOLOGY | Facility: HOSPITAL | Age: 74
DRG: 066 | End: 2019-02-21
Attending: EMERGENCY MEDICINE
Payer: MEDICARE

## 2019-02-21 VITALS
BODY MASS INDEX: 23.3 KG/M2 | OXYGEN SATURATION: 93 % | WEIGHT: 187.38 LBS | TEMPERATURE: 99 F | SYSTOLIC BLOOD PRESSURE: 141 MMHG | HEART RATE: 86 BPM | RESPIRATION RATE: 18 BRPM | HEIGHT: 75 IN | DIASTOLIC BLOOD PRESSURE: 83 MMHG

## 2019-02-21 LAB
ALBUMIN SERPL BCP-MCNC: 3.9 G/DL
ALP SERPL-CCNC: 69 U/L
ALT SERPL W/O P-5'-P-CCNC: 13 U/L
ANION GAP SERPL CALC-SCNC: 11 MMOL/L
APTT BLDCRRT: 30.5 SEC
AST SERPL-CCNC: 14 U/L
AV INDEX (PROSTH): 0.95
AV MEAN GRADIENT: 2.21 MMHG
AV PEAK GRADIENT: 3.46 MMHG
AV VALVE AREA: 3.08 CM2
AV VELOCITY RATIO: 1.18
BASOPHILS # BLD AUTO: 0.11 K/UL
BASOPHILS NFR BLD: 2 %
BILIRUB SERPL-MCNC: 0.6 MG/DL
BSA FOR ECHO PROCEDURE: 2.12 M2
BUN SERPL-MCNC: 16 MG/DL
CALCIUM SERPL-MCNC: 10.2 MG/DL
CHLORIDE SERPL-SCNC: 104 MMOL/L
CK MB SERPL-MCNC: 1 NG/ML
CK MB SERPL-RTO: 1 %
CK SERPL-CCNC: 98 U/L
CO2 SERPL-SCNC: 22 MMOL/L
CREAT SERPL-MCNC: 1 MG/DL
CV ECHO LV RWT: 0.36 CM
DIFFERENTIAL METHOD: ABNORMAL
DOP CALC AO PEAK VEL: 0.93 M/S
DOP CALC AO VTI: 19.23 CM
DOP CALC LVOT AREA: 3.23 CM2
DOP CALC LVOT DIAMETER: 2.03 CM
DOP CALC LVOT PEAK VEL: 1.1 M/S
DOP CALC LVOT STROKE VOLUME: 59.23 CM3
DOP CALCLVOT PEAK VEL VTI: 18.31 CM
E WAVE DECELERATION TIME: 249.51 MSEC
E/A RATIO: 0.61
E/E' RATIO: 6.8
ECHO LV POSTERIOR WALL: 0.8 CM (ref 0.6–1.1)
EOSINOPHIL # BLD AUTO: 0.3 K/UL
EOSINOPHIL NFR BLD: 5.9 %
ERYTHROCYTE [DISTWIDTH] IN BLOOD BY AUTOMATED COUNT: 14.6 %
EST. GFR  (AFRICAN AMERICAN): >60 ML/MIN/1.73 M^2
EST. GFR  (NON AFRICAN AMERICAN): >60 ML/MIN/1.73 M^2
FRACTIONAL SHORTENING: 30 % (ref 28–44)
GLUCOSE SERPL-MCNC: 133 MG/DL
HCT VFR BLD AUTO: 48.8 %
HGB BLD-MCNC: 15.8 G/DL
IMM GRANULOCYTES # BLD AUTO: 0.01 K/UL
IMM GRANULOCYTES NFR BLD AUTO: 0.2 %
INR PPP: 1.1
INTERVENTRICULAR SEPTUM: 0.8 CM (ref 0.6–1.1)
LA MAJOR: 4.5 CM
LA MINOR: 4.7 CM
LA WIDTH: 3.2 CM
LEFT ATRIUM SIZE: 3.21 CM
LEFT ATRIUM VOLUME INDEX: 18.8 ML/M2
LEFT ATRIUM VOLUME: 40.14 CM3
LEFT INTERNAL DIMENSION IN SYSTOLE: 3.1 CM (ref 2.1–4)
LEFT VENTRICLE DIASTOLIC VOLUME INDEX: 46.97 ML/M2
LEFT VENTRICLE DIASTOLIC VOLUME: 100.25 ML
LEFT VENTRICLE MASS INDEX: 51.3 G/M2
LEFT VENTRICLE SYSTOLIC VOLUME INDEX: 15.8 ML/M2
LEFT VENTRICLE SYSTOLIC VOLUME: 33.74 ML
LEFT VENTRICULAR INTERNAL DIMENSION IN DIASTOLE: 4.4 CM (ref 3.5–6)
LEFT VENTRICULAR MASS: 109.44 G
LV LATERAL E/E' RATIO: 6.38
LV SEPTAL E/E' RATIO: 7.29
LYMPHOCYTES # BLD AUTO: 1.6 K/UL
LYMPHOCYTES NFR BLD: 29.4 %
MAGNESIUM SERPL-MCNC: 2 MG/DL
MCH RBC QN AUTO: 27.8 PG
MCHC RBC AUTO-ENTMCNC: 32.4 G/DL
MCV RBC AUTO: 86 FL
MONOCYTES # BLD AUTO: 0.5 K/UL
MONOCYTES NFR BLD: 8.8 %
MV PEAK A VEL: 0.84 M/S
MV PEAK E VEL: 0.51 M/S
NEUTROPHILS # BLD AUTO: 2.9 K/UL
NEUTROPHILS NFR BLD: 53.7 %
NRBC BLD-RTO: 0 /100 WBC
PHOSPHATE SERPL-MCNC: 4 MG/DL
PLATELET # BLD AUTO: 201 K/UL
PMV BLD AUTO: 9.8 FL
POCT GLUCOSE: 128 MG/DL (ref 70–110)
POCT GLUCOSE: 128 MG/DL (ref 70–110)
POCT GLUCOSE: 165 MG/DL (ref 70–110)
POTASSIUM SERPL-SCNC: 3.8 MMOL/L
PROT SERPL-MCNC: 6.6 G/DL
PROTHROMBIN TIME: 11.3 SEC
PULM VEIN S/D RATIO: 0.76
PV PEAK D VEL: 0.38 M/S
PV PEAK S VEL: 0.29 M/S
RA MAJOR: 4.1 CM
RA PRESSURE: 3 MMHG
RA WIDTH: 3.1 CM
RBC # BLD AUTO: 5.68 M/UL
RIGHT VENTRICULAR END-DIASTOLIC DIMENSION: 3.85 CM
SINUS: 2.97 CM
SODIUM SERPL-SCNC: 137 MMOL/L
TDI LATERAL: 0.08
TDI SEPTAL: 0.07
TDI: 0.08
TRICUSPID ANNULAR PLANE SYSTOLIC EXCURSION: 2.78 CM
TROPONIN I SERPL DL<=0.01 NG/ML-MCNC: 0.02 NG/ML
WBC # BLD AUTO: 5.45 K/UL

## 2019-02-21 PROCEDURE — 85025 COMPLETE CBC W/AUTO DIFF WBC: CPT

## 2019-02-21 PROCEDURE — 80053 COMPREHEN METABOLIC PANEL: CPT

## 2019-02-21 PROCEDURE — 99238 PR HOSPITAL DISCHARGE DAY,<30 MIN: ICD-10-PCS | Mod: ,,, | Performed by: PSYCHIATRY & NEUROLOGY

## 2019-02-21 PROCEDURE — 85610 PROTHROMBIN TIME: CPT

## 2019-02-21 PROCEDURE — A4216 STERILE WATER/SALINE, 10 ML: HCPCS | Performed by: NURSE PRACTITIONER

## 2019-02-21 PROCEDURE — 93272 ECG/REVIEW INTERPRET ONLY: CPT | Mod: ,,, | Performed by: INTERNAL MEDICINE

## 2019-02-21 PROCEDURE — 93272 CARDIAC EVENT MONITOR (CUPID ONLY): ICD-10-PCS | Mod: ,,, | Performed by: INTERNAL MEDICINE

## 2019-02-21 PROCEDURE — 94761 N-INVAS EAR/PLS OXIMETRY MLT: CPT

## 2019-02-21 PROCEDURE — 99238 HOSP IP/OBS DSCHRG MGMT 30/<: CPT | Mod: ,,, | Performed by: PSYCHIATRY & NEUROLOGY

## 2019-02-21 PROCEDURE — 84100 ASSAY OF PHOSPHORUS: CPT

## 2019-02-21 PROCEDURE — 84484 ASSAY OF TROPONIN QUANT: CPT

## 2019-02-21 PROCEDURE — 85730 THROMBOPLASTIN TIME PARTIAL: CPT

## 2019-02-21 PROCEDURE — 63600175 PHARM REV CODE 636 W HCPCS: Performed by: NURSE PRACTITIONER

## 2019-02-21 PROCEDURE — 82553 CREATINE MB FRACTION: CPT

## 2019-02-21 PROCEDURE — 82550 ASSAY OF CK (CPK): CPT

## 2019-02-21 PROCEDURE — 93271 ECG/MONITORING AND ANALYSIS: CPT

## 2019-02-21 PROCEDURE — 36415 COLL VENOUS BLD VENIPUNCTURE: CPT

## 2019-02-21 PROCEDURE — 25000003 PHARM REV CODE 250: Performed by: NURSE PRACTITIONER

## 2019-02-21 PROCEDURE — 83735 ASSAY OF MAGNESIUM: CPT

## 2019-02-21 RX ORDER — CLOPIDOGREL BISULFATE 75 MG/1
75 TABLET ORAL DAILY
Qty: 30 TABLET | Refills: 11 | Status: SHIPPED | OUTPATIENT
Start: 2019-02-22 | End: 2019-07-22

## 2019-02-21 RX ADMIN — Medication 3 ML: at 01:02

## 2019-02-21 RX ADMIN — ATORVASTATIN CALCIUM 40 MG: 10 TABLET, FILM COATED ORAL at 08:02

## 2019-02-21 RX ADMIN — Medication 3 ML: at 05:02

## 2019-02-21 RX ADMIN — HEPARIN SODIUM 5000 UNITS: 5000 INJECTION, SOLUTION INTRAVENOUS; SUBCUTANEOUS at 01:02

## 2019-02-21 RX ADMIN — CLOPIDOGREL 75 MG: 75 TABLET, FILM COATED ORAL at 08:02

## 2019-02-21 RX ADMIN — HEPARIN SODIUM 5000 UNITS: 5000 INJECTION, SOLUTION INTRAVENOUS; SUBCUTANEOUS at 05:02

## 2019-02-21 NOTE — PROGRESS NOTES
[facility]  Vascular Neurology  Comprehensive Stroke Center  Progress Note    Assessment/Plan:     * TIA (transient ischemic attack)    74 year old male with past medical hx of HTN, HLD, DMII presented to the ED today with complaints of slurred speech starting around 12:00 PM. He was seen yesterday in the ED for similar symptoms. CTA H/N was completed revealing L ICA stenosis at 50% and R ICA at 30 %. Patient was instructed to continue taking ASA 81 and increase Lipitor to 40 mg. Patient also instructed to follow up with Vascular Surgery and schedule an outpatient echocardiogram. Patient states slurred speech has happened at least two times in the past 24 hours. Yesterday episode lasted aprox 3 minutes while today's episode lasted only a few seconds. Patient denies any vision changes, unilateral weakness, or numbness.     On assessment patient back to baseline. NIH of 0. Considering patient with at least two episodes of dysarthria in the past 24 hours patient would benefit from inpatient TIA work up.     Will send home on 30 day cardiac event monitor.     Antithrombotics for secondary stroke prevention: Antiplatelets: Aspirin: 81 mg daily, Clopidogrel 75 mg daily    Statins for secondary stroke prevention and hyperlipidemia, if present:   Statins: Atorvastatin- 40 mg daily    Aggressive risk factor modification: HTN, DM, HLD     Rehab efforts: This patient has been evaluated by a stroke team provider, and therapy needs have been fully considered based off of their presenting complaint and exam findings. At this time, the patient does not need formal evaluation by PT, OT, Speech and PM&R. Appropriate consults have been placed for evaluation and treatment.    Diagnostics ordered/pending: none     VTE prophylaxis: Heparin 5000 units SQ every 8 hours; Mechanical SCDs    BP parameters: Infarct: No intervention, SBP <220         Mixed hyperlipidemia    Stroke risk factor  LDL 99.2  Atorvastatin 40 mg       Hypertension    Stroke risk factor  Holding home meds lisinopril and HCTZ  SBP <220      DM type 2 (diabetes mellitus, type 2)    Stroke risk factor  Hgb A1C 7.6  SSI  Dietitian has seen and educated patient          2/21/2019 - Patient mistakenly took the following home meds: lisinopril, HCTZ, ASA, and preservision. Reeducated to only take meds administered by medical staff. Patient verbalized understanding. Echo unremarkable. MRI brain shows acute infarction within the right precentral gyrus and right superior temporal gyrus.     STROKE DOCUMENTATION   Acute Stroke Times   Last Known Normal Date: 02/20/19  Last Known Normal Time: 1200  Symptom Onset Date: 02/20/19  Symptom Onset Time: 1200  Stroke Team Called Date: 02/20/19  Stroke Team Called Time: 1230  Stroke Team Arrival Date: 02/20/19  Stroke Team Arrival Time: 1232  CT Interpretation Time: 1234  Decision to Treat Time for Alteplase: (NA)  Decision to Treat Time for IR: (NA)    NIH Scale:  1a. Level of Consciousness: 0-->Alert, keenly responsive  1b. LOC Questions: 0-->Answers both questions correctly  1c. LOC Commands: 0-->Performs both tasks correctly  2. Best Gaze: 0-->Normal  3. Visual: 0-->No visual loss  4. Facial Palsy: 0-->Normal symmetrical movements  5a. Motor Arm, Left: 0-->No drift, limb holds 90 (or 45) degrees for full 10 secs  5b. Motor Arm, Right: 0-->No drift, limb holds 90 (or 45) degrees for full 10 secs  6a. Motor Leg, Left: 0-->No drift, leg holds 30 degree position for full 5 secs  6b. Motor Leg, Right: 0-->No drift, leg holds 30 degree position for full 5 secs  7. Limb Ataxia: 0-->Absent  8. Sensory: 0-->Normal, no sensory loss  9. Best Language: 0-->No aphasia, normal  10. Dysarthria: 0-->Normal  11. Extinction and Inattention (formerly Neglect): 0-->No abnormality  Total (NIH Stroke Scale): 0       Modified Centerview Score: 1  Marble Coma Scale:    ABCD2 Score: 4  [id number] Score:   HAS -BLED Score:   ICH Score:   Hunt & Lozano  Classification:      Hemorrhagic change of an Ischemic Stroke: Does this patient have an ischemic stroke with hemorrhagic changes? No     Neurologic Chief Complaint: dysarthria     Subjective:     Interval History:  Patient mistakenly took the following home medications this morning: lisinopril, HCTZ, ASA, and preservision. Reeducated to only take medications administered by medical staff. Patient verbalized understanding. Echo unremarkable. MRI brain shows acute infarction within the right precentral gyrus and right superior temporal gyrus.     HPI, Past Medical, Family, and Social History remains the same as documented in the initial encounter.     Review of Systems   Constitutional: Negative for fever.   Respiratory: Negative for shortness of breath.    Neurological: Negative for speech difficulty.     Scheduled Meds:   aspirin  81 mg Oral Daily    atorvastatin  40 mg Oral Daily    bimatoprost  1 drop Both Eyes Daily    brinzolamide-brimonidine  1 drop Both Eyes TID    clopidogrel  75 mg Oral Daily    heparin (porcine)  5,000 Units Subcutaneous Q8H    sodium chloride 0.9%  3 mL Intravenous Q8H     Continuous Infusions:   sodium chloride 0.9%       PRN Meds:dextrose 50%, dextrose 50%, glucagon (human recombinant), glucose, glucose, insulin aspart U-100, labetalol, sodium chloride 0.9%    Objective:     Vital Signs (Most Recent):  Temp: 99.1 °F (37.3 °C) (02/21/19 1226)  Pulse: 85 (02/21/19 1226)  Resp: 18 (02/21/19 1226)  BP: 130/81 (02/21/19 1226)  SpO2: (!) 92 % (02/21/19 1226)  BP Location: Right arm    Vital Signs Range (Last 24H):  Temp:  [98.1 °F (36.7 °C)-99.1 °F (37.3 °C)]   Pulse:  [69-85]   Resp:  [14-18]   BP: (123-159)/(61-81)   SpO2:  [92 %-97 %]   BP Location: Right arm    Physical Exam   Constitutional: He appears well-developed and well-nourished.   Eyes: Pupils are equal, round, and reactive to light.   Cardiovascular: Normal rate.   Pulmonary/Chest: Effort normal.   Neurological:   See  below    Nursing note and vitals reviewed.      Neurological Exam:   LOC: alert  Attention Span: Good   Language: No aphasia  Articulation: No dysarthria  Orientation: Person, Place, Time   Visual Fields: Full  EOM (CN III, IV, VI): Full/intact  Pupils (CN II, III): PERRL  Facial Sensation (CN V): Normal  Facial Movement (CN VII): Symmetric facial expression    Motor: Arm left  Normal 5/5  Leg left  Normal 5/5  Arm right  Normal 5/5  Leg right Normal 5/5  Sensation: Intact to light touch, temperature and vibration    Laboratory:  CMP:   Recent Labs   Lab 02/21/19  0447   CALCIUM 10.2   ALBUMIN 3.9   PROT 6.6      K 3.8   CO2 22*      BUN 16   CREATININE 1.0   ALKPHOS 69   ALT 13   AST 14   BILITOT 0.6     CBC:   Recent Labs   Lab 02/21/19  0447   WBC 5.45   RBC 5.68   HGB 15.8   HCT 48.8      MCV 86   MCH 27.8   MCHC 32.4     Lipid Panel:   Recent Labs   Lab 02/20/19  1235   CHOL 166   LDLCALC 99.2   HDL 44   TRIG 114     Coagulation:   Recent Labs   Lab 02/21/19  0447   INR 1.1   APTT 30.5     Hgb A1C:   Recent Labs   Lab 02/20/19  1235   HGBA1C 7.6*     TSH:   Recent Labs   Lab 02/20/19  1235   TSH 1.120       Diagnostic Results     Brain imaging:  CT head 2/20/19  No acute intracranial findings.  Stable mild generalized cerebral volume loss.  No evidence for acute intracranial hemorrhage or new abnormal parenchymal attenuation.. Clinical correlation and further evaluation as warranted.     MRI brain 2/20/19  Focus of acute infarction within the right precentral gyrus and right superior temporal gyrus.  Changes of chronic small vessel ischemic disease and cerebral volume loss.       Vessel Imaging:  CTA head and neck 2/19/19     Bilateral extracranial carotid disease which is worse on the left measuring up to the 50% stenosis. Right internal carotid artery has 30% stenosis.  No large vessel occlusion intracranially.  Intracranial atherosclerotic disease is present.  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  Patient has prominent posterior communicating arteries bilaterally.      Cardiac Imaging:   TTE 2/21/19  · Normal left ventricular systolic function. The estimated ejection fraction is 55%  · No wall motion abnormalities.  · Normal LV diastolic function.  · Normal right ventricular systolic function.  · Normal central venous pressure (3 mm Hg).      Jose Will NP  [facility]  Department of Vascular Neurology   [facility]

## 2019-02-21 NOTE — PLAN OF CARE
02/21/19 1537   Final Note   Assessment Type Final Discharge Note   Anticipated Discharge Disposition Home   Right Care Referral Info   Post Acute Recommendation No Care

## 2019-02-21 NOTE — PLAN OF CARE
Problem: Fall Injury Risk  Goal: Absence of Fall and Fall-Related Injury    Intervention: Identify and Manage Contributors to Fall Injury Risk  Instructed on stroke prevention , when to call 911 and to call for assistance before getting up , pt verbalized understanding

## 2019-02-21 NOTE — NURSING
08:15- This nurse noted that patient had taken home medications. NP made aware of this. This nurse held medication already taken.

## 2019-02-21 NOTE — DISCHARGE SUMMARY
[facility]  Vascular Neurology  Comprehensive Stroke Center  Discharge Summary     Summary:     Admit Date: 2/20/2019 12:26 PM    Discharge Date and Time: No discharge date for patient encounter.    Attending Physician: Rose Rocha MD     Discharge Provider: Jose Wlil NP    History of Present Illness: 74 year old male with past medical hx of HTN, HLD, DMII presented to the ED today with complaints of slurred speech starting around 12:00 PM. He was seen yesterday in the ED for similar symptoms. CTA H/N was completed revealing L ICA stenosis at 50% and R ICA at 30 %. Patient was instructed to continue taking ASA 81 and increase Lipitor to 40 mg. Patient also instructed to follow up with Vascular Surgery and schedule an outpatient echocardiogram. Patient states slurred speech has happened at least two times in the past 24 hours. Yesterday episode lasted aprox 3 minutes while today's episode lasted only a few seconds. Patient denies any vision changes, unilateral weakness, or numbness.     Hospital Course (synopsis of major diagnoses, care, treatment, and services provided during the course of the hospital stay): 74 year old male with past medical hx of HTN, HLD, DMII presented to the ED 2/20/19 with complaints of slurred speech starting around 12:00 PM. He was seen 2/19/19 in the ED for similar symptoms. CTA H/N was completed revealing L ICA stenosis at 50% and R ICA at 30 %. Patient was instructed to continue taking ASA 81 and increase Lipitor to 40 mg. Patient also instructed to follow up with Vascular Surgery and schedule an outpatient echocardiogram. Patient states slurred speech has happened at least two times in the past 24 hours. 2/19/19 episode lasted aprox 3 minutes while 2/20/19 episode lasted only a few seconds. Patient denies any vision changes, unilateral weakness, or numbness. On assessment patient back to baseline. NIH of 0. Considering patient with at least two episodes of  dysarthria in the past 24 hours patient would benefit from inpatient TIA work up.  CT head no acute intracranial findings. MRI brain with acute infarction within the right precentral gyrus and right superior temporal gyrus. Echo unremarkable. Will send patient home on 30 day cardiac event monitor.  ASA 81 mg and atorvastatin 40 mg continued. Added Clopidogrel 75 mg to patient's regimen for secondary stroke prevention. Instructed patient to continue with vascular surgery appointment scheduled for 2/28/19.        Stroke Etiology: Undetermined Cause. 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 Level of Consciousness: 0-->Alert, keenly responsive  1b. LOC Questions: 0-->Answers both questions correctly  1c. LOC Commands: 0-->Performs both tasks correctly  2. Best Gaze: 0-->Normal  3. Visual: 0-->No visual loss  4. Facial Palsy: 0-->Normal symmetrical movements  5a. Motor Arm, Left: 0-->No drift, limb holds 90 (or 45) degrees for full 10 secs  5b. Motor Arm, Right: 0-->No drift, limb holds 90 (or 45) degrees for full 10 secs  6a. Motor Leg, Left: 0-->No drift, leg holds 30 degree position for full 5 secs  6b. Motor Leg, Right: 0-->No drift, leg holds 30 degree position for full 5 secs  7. Limb Ataxia: 0-->Absent  8. Sensory: 0-->Normal, no sensory loss  9. Best Language: 0-->No aphasia, normal  10. Dysarthria: 0-->Normal  11. Extinction and Inattention (formerly Neglect): 0-->No abnormality  Total (NIH Stroke Scale): 0        Modified Princeton Score: 0  Carlton Coma Scale:     ABCD2 Score: 4  [id number] Score:   HAS -BLED Score:   ICH Score:   Hunt & Lozano Classification:       Assessment/Plan:     Diagnostic Results:    Brain imaging:  CT head 2/20/19  No acute intracranial findings.  Stable mild generalized cerebral volume loss.  No evidence for acute intracranial hemorrhage or new abnormal parenchymal attenuation.. Clinical correlation and further evaluation as warranted.     MRI brain 2/20/19  Focus of acute infarction within the right precentral gyrus and right superior temporal gyrus.  Changes of chronic small vessel ischemic disease and cerebral volume loss.         Vessel Imaging:  CTA head and neck 2/19/19     Bilateral extracranial carotid disease which is worse on the left measuring up to the 50% stenosis. Right internal carotid artery has 30% stenosis.  No large vessel occlusion intracranially.  Intracranial atherosclerotic disease is present.  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  Patient has prominent posterior communicating arteries bilaterally.        Cardiac Imaging:   TTE 2/21/19  · Normal left ventricular systolic function. The estimated ejection fraction is 55%  · No wall motion abnormalities.  · Normal LV diastolic function.  · Normal right ventricular systolic function.  · Normal central venous pressure (3 mm Hg).       Interventions: None    Complications: None    Disposition: Home or Self Care    Final Active Diagnoses:    Diagnosis Date Noted POA    PRINCIPAL PROBLEM:  TIA (transient ischemic attack) [G45.9] 02/12/2015 Yes    Mixed hyperlipidemia [E78.2] 02/20/2019 Yes    Dysarthria [R47.1] 02/19/2019 Yes    DM type 2 (diabetes mellitus, type 2) [E11.9] 02/12/2015 Yes    Hypertension [I10] 02/12/2015 Yes      Problems Resolved During this Admission:     * TIA (transient ischemic attack)    74 year old male with past medical hx of HTN, HLD, DMII presented to the ED today with complaints of slurred speech  starting around 12:00 PM. He was seen yesterday in the ED for similar symptoms. CTA H/N was completed revealing L ICA stenosis at 50% and R ICA at 30 %. Patient was instructed to continue taking ASA 81 and increase Lipitor to 40 mg. Patient also instructed to follow up with Vascular Surgery and schedule an outpatient echocardiogram. Patient states slurred speech has happened at least two times in the past 24 hours. Yesterday episode lasted aprox 3 minutes while today's episode lasted only a few seconds. Patient denies any vision changes, unilateral weakness, or numbness.     On assessment patient back to baseline. NIH of 0. Considering patient with at least two episodes of dysarthria in the past 24 hours patient would benefit from inpatient TIA work up.     Will send home on 30 day cardiac event monitor.     Antithrombotics for secondary stroke prevention: Antiplatelets: Aspirin: 81 mg daily, Clopidogrel 75 mg daily    Statins for secondary stroke prevention and hyperlipidemia, if present:   Statins: Atorvastatin- 40 mg daily    Aggressive risk factor modification: HTN, DM, HLD     Rehab efforts: This patient has been evaluated by a stroke team provider, and therapy needs have been fully considered based off of their presenting complaint and exam findings. At this time, the patient does not need formal evaluation by PT, OT, Speech and PM&R. Appropriate consults have been placed for evaluation and treatment.    Diagnostics ordered/pending: none     VTE prophylaxis: Heparin 5000 units SQ every 8 hours; Mechanical SCDs    BP parameters: Infarct: No intervention, SBP <220         Mixed hyperlipidemia    Stroke risk factor  LDL 99.2  Atorvastatin 40 mg      Hypertension    Stroke risk factor  Continue home meds when discharged: lisinopril and HCTZ       DM type 2 (diabetes mellitus, type 2)    Stroke risk factor  Hgb A1C 7.6  [facility]  Dietitian has seen and educated patient         Recommendations:     Post-discharge  complication risks: Falls    Stroke Education given to: patient    Follow-up in Stroke Clinic in 4-6 weeks.     Discharge Plan:  Antithrombotics: Aspirin 81 mg, Clopidogrel 75mg  Statin: Atorvastatin 40 mg    Follow Up:  Follow-up Information     Petrona Welch MD.    Specialty:  Internal Medicine  Why:  Outpatient Services  Contact information:  [address] [facility] [address]  [phone]                   Patient Instructions:      Diet Cardiac   Order Comments: See Stroke Patient Education Guide Booklet for details.     Diet diabetic     Call 911 for any of the following:   Order Comments: Call 911  right away if any of the following warning signs come on suddenly, even if the symptoms only last for a few minutes. With stroke, timing is very important.   - Warning Signs of Stroke:  - Weakness: You may feel a sudden weakness, tingling or loss of feeling on one side of your face or body.  - Vision Problems: You may have sudden double vision or trouble seeing in one or both eyes.  - Speech Problems: You may have sudden trouble talking, slured speech, or problems understanding others.  - Headache: You may have sudden, severe headache.  - Movement Problems: You may experience dizziness, a feeling of spinning, a loss of balance, a feeling of falling or blackouts.     Activity as tolerated       Medications:  Reconciled Home Medications:      Medication List      START taking these medications    clopidogrel 75 mg tablet  Commonly known as:  PLAVIX  Take 1 tablet (75 mg total) by mouth once daily.  Start taking on:  2/22/2019  Notes to patient:  Next dose: 2/22 morning        CONTINUE taking these medications    ASPIR-LOW 81 MG EC tablet  Generic drug:  aspirin  Notes to patient:  Next dose: 2/22 morning     atorvastatin 40 MG tablet  Commonly known as:  LIPITOR  Take 1 tablet (40 mg total) by mouth once daily.  Notes to patient:  Next dose: 2/22 morning     blood sugar diagnostic Strp  1 strip by  Misc.(Non-Drug; Combo Route) route 3 (three) times daily.     hydroCHLOROthiazide 25 MG tablet  Commonly known as:  HYDRODIURIL  Take 25 mg by mouth once daily.  Notes to patient:  Next dose: 2/22 morning     lancets 33 gauge Misc  Commonly known as:  ONETOUCH DELICA LANCETS  1 lancet by Misc.(Non-Drug; Combo Route) route 3 (three) times daily before meals.     lisinopril 20 MG tablet  Commonly known as:  PRINIVIL,ZESTRIL  Take 20 mg by mouth once daily.  Notes to patient:  Next dose: 2/22 morning     LUMIGAN 0.01 % Drop  Generic drug:  bimatoprost     metFORMIN 500 MG tablet  Commonly known as:  GLUCOPHAGE  Notes to patient:  As taken at home     SIMBRINZA 1-0.2 % Drps  Generic drug:  brinzolamide-brimonidine        STOP taking these medications    econazole nitrate 1 % cream            Jose Will NP  [facility]  Department of Vascular Neurology   [facility]

## 2019-02-21 NOTE — PLAN OF CARE
PCP  Petrona Welch MD  [address], [place] [address]  Phone: [phone]    Pharmacy  [facility]    Insurance  Medicare Part A & B   for Life/Medicare Supplement    Emergency Contact  Joan Link-wife  [phone]       02/21/19 1522   Discharge Assessment   Assessment Type Discharge Planning Assessment   Confirmed/corrected address and phone number on facesheet? Yes   Assessment information obtained from? Patient   Communicated expected length of stay with patient/caregiver no   Prior to hospitilization cognitive status: Alert/Oriented   Prior to hospitalization functional status: Independent   Current cognitive status: Alert/Oriented   Current Functional Status: (YUAN)   Lives With spouse   Able to Return to Prior Arrangements yes   Is patient able to care for self after discharge? Unable to determine at this time (comments)   Who are your caregiver(s) and their phone number(s)? wife-Joan Link [phone]   Patient's perception of discharge disposition home or selfcare   Readmission Within the Last 30 Days no previous admission in last 30 days   Patient currently being followed by outpatient case management? No   Patient currently receives any other outside agency services? No   Equipment Currently Used at Home none   Do you have any problems affording any of your prescribed medications? No   Is the patient taking medications as prescribed? yes   Does the patient have transportation home? Yes   Transportation Anticipated family or friend will provide   Does the patient receive services at the Coumadin Clinic? No   Discharge Plan A Home with family   Discharge Plan B Home Health   Patient/Family in Agreement with Plan yes

## 2019-02-21 NOTE — CONSULTS
Food & Nutrition  Education    Diet Education: Cardiac/ stroke  Time Spent:  min  Learners:Pt      Nutrition Education provided with handouts: Cardiac TLC/ stroke prevention      Comments: pt states follows a low Na diet at home. Able to v/u of the diet. States uses very little salt in cooking, no shaker on dinner table, does not consume frozen meals/ canned vegetable and reads labels. State sonly consumes foods under 400mg Na. RD encourage pt to stay under 300mg Na/ serving  All questions and concerns answered. Dietitian's contact information provided.     LAB:   Recent Labs   Lab 02/21/19  0447   CPK 98   CPKMB 1.0   TROPONINI 0.024   MB 1.0     Lab Results   Component Value Date    CHOL 166 02/20/2019    CHOL 166 02/19/2019    CHOL 146 02/13/2015     Lab Results   Component Value Date    HDL 44 02/20/2019    HDL 47 02/19/2019    HDL 27 (L) 02/13/2015     Lab Results   Component Value Date    LDLCALC 99.2 02/20/2019    LDLCALC 100.2 02/19/2019    LDLCALC 64.2 02/13/2015     Lab Results   Component Value Date    TRIG 114 02/20/2019    TRIG 94 02/19/2019    TRIG 274 (H) 02/13/2015     Lab Results   Component Value Date    CHOLHDL 26.5 02/20/2019    CHOLHDL 28.3 02/19/2019    CHOLHDL 18.5 (L) 02/13/2015       Follow-Up: 1/wk    Please Re-consult as needed        Thanks!

## 2019-02-21 NOTE — SUBJECTIVE & OBJECTIVE
Neurologic Chief Complaint: dysarthria     Subjective:     Interval History:  Patient mistakenly took the following home medications this morning: lisinopril, HCTZ, ASA, and preservision. Reeducated to only take medications administered by medical staff. Patient verbalized understanding. Echo unremarkable. MRI brain shows acute infarction within the right precentral gyrus and right superior temporal gyrus.     HPI, Past Medical, Family, and Social History remains the same as documented in the initial encounter.     Review of Systems   Constitutional: Negative for fever.   Respiratory: Negative for shortness of breath.    Neurological: Negative for speech difficulty.     Scheduled Meds:   aspirin  81 mg Oral Daily    atorvastatin  40 mg Oral Daily    bimatoprost  1 drop Both Eyes Daily    brinzolamide-brimonidine  1 drop Both Eyes TID    clopidogrel  75 mg Oral Daily    heparin (porcine)  5,000 Units Subcutaneous Q8H    sodium chloride 0.9%  3 mL Intravenous Q8H     Continuous Infusions:   sodium chloride 0.9%       PRN Meds:dextrose 50%, dextrose 50%, glucagon (human recombinant), glucose, glucose, insulin aspart U-100, labetalol, sodium chloride 0.9%    Objective:     Vital Signs (Most Recent):  Temp: 99.1 °F (37.3 °C) (02/21/19 1226)  Pulse: 85 (02/21/19 1226)  Resp: 18 (02/21/19 1226)  BP: 130/81 (02/21/19 1226)  SpO2: (!) 92 % (02/21/19 1226)  BP Location: Right arm    Vital Signs Range (Last 24H):  Temp:  [98.1 °F (36.7 °C)-99.1 °F (37.3 °C)]   Pulse:  [69-85]   Resp:  [14-18]   BP: (123-159)/(61-81)   SpO2:  [92 %-97 %]   BP Location: Right arm    Physical Exam   Constitutional: He appears well-developed and well-nourished.   Eyes: Pupils are equal, round, and reactive to light.   Cardiovascular: Normal rate.   Pulmonary/Chest: Effort normal.   Neurological:   See below    Nursing note and vitals reviewed.      Neurological Exam:   LOC: alert  Attention Span: Good   Language: No aphasia  Articulation:  No dysarthria  Orientation: Person, Place, Time   Visual Fields: Full  EOM (CN III, IV, VI): Full/intact  Pupils (CN II, III): PERRL  Facial Sensation (CN V): Normal  Facial Movement (CN VII): Symmetric facial expression    Motor: Arm left  Normal 5/5  Leg left  Normal 5/5  Arm right  Normal 5/5  Leg right Normal 5/5  Sensation: Intact to light touch, temperature and vibration    Laboratory:  CMP:   Recent Labs   Lab 02/21/19  0447   CALCIUM 10.2   ALBUMIN 3.9   PROT 6.6      K 3.8   CO2 22*      BUN 16   CREATININE 1.0   ALKPHOS 69   ALT 13   AST 14   BILITOT 0.6     CBC:   Recent Labs   Lab 02/21/19  0447   WBC 5.45   RBC 5.68   HGB 15.8   HCT 48.8      MCV 86   MCH 27.8   MCHC 32.4     Lipid Panel:   Recent Labs   Lab 02/20/19  1235   CHOL 166   LDLCALC 99.2   HDL 44   TRIG 114     Coagulation:   Recent Labs   Lab 02/21/19  0447   INR 1.1   APTT 30.5     Hgb A1C:   Recent Labs   Lab 02/20/19  1235   HGBA1C 7.6*     TSH:   Recent Labs   Lab 02/20/19  1235   TSH 1.120       Diagnostic Results     Brain imaging:  CT head 2/20/19  No acute intracranial findings.  Stable mild generalized cerebral volume loss.  No evidence for acute intracranial hemorrhage or new abnormal parenchymal attenuation.. Clinical correlation and further evaluation as warranted.     MRI brain 2/20/19  Focus of acute infarction within the right precentral gyrus and right superior temporal gyrus.  Changes of chronic small vessel ischemic disease and cerebral volume loss.       Vessel Imaging:  CTA head and neck 2/19/19     Bilateral extracranial carotid disease which is worse on the left measuring up to the 50% stenosis. Right internal carotid artery has 30% stenosis.  No large vessel occlusion intracranially.  Intracranial atherosclerotic disease is present.  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  Patient has prominent posterior communicating arteries bilaterally.      Cardiac  Imaging:   TTE 2/21/19  · Normal left ventricular systolic function. The estimated ejection fraction is 55%  · No wall motion abnormalities.  · Normal LV diastolic function.  · Normal right ventricular systolic function.  · Normal central venous pressure (3 mm Hg).

## 2019-02-21 NOTE — ASSESSMENT & PLAN NOTE
74 year old male with past medical hx of HTN, HLD, DMII presented to the ED today with complaints of slurred speech starting around 12:00 PM. He was seen yesterday in the ED for similar symptoms. CTA H/N was completed revealing L ICA stenosis at 50% and R ICA at 30 %. Patient was instructed to continue taking ASA 81 and increase Lipitor to 40 mg. Patient also instructed to follow up with Vascular Surgery and schedule an outpatient echocardiogram. Patient states slurred speech has happened at least two times in the past 24 hours. Yesterday episode lasted aprox 3 minutes while today's episode lasted only a few seconds. Patient denies any vision changes, unilateral weakness, or numbness.     On assessment patient back to baseline. NIH of 0. Considering patient with at least two episodes of dysarthria in the past 24 hours patient would benefit from inpatient TIA work up.     Will send home on 30 day cardiac event monitor.     Antithrombotics for secondary stroke prevention: Antiplatelets: Aspirin: 81 mg daily, Clopidogrel 75 mg daily    Statins for secondary stroke prevention and hyperlipidemia, if present:   Statins: Atorvastatin- 40 mg daily    Aggressive risk factor modification: HTN, DM, HLD     Rehab efforts: This patient has been evaluated by a stroke team provider, and therapy needs have been fully considered based off of their presenting complaint and exam findings. At this time, the patient does not need formal evaluation by PT, OT, Speech and PM&R. Appropriate consults have been placed for evaluation and treatment.    Diagnostics ordered/pending: none     VTE prophylaxis: Heparin 5000 units SQ every 8 hours; Mechanical SCDs    BP parameters: Infarct: No intervention, SBP <220

## 2019-02-21 NOTE — HOSPITAL COURSE
74 year old male with past medical hx of HTN, HLD, DMII presented to the ED 2/20/19 with complaints of slurred speech starting around 12:00 PM. He was seen 2/19/19 in the ED for similar symptoms. CTA H/N was completed revealing L ICA stenosis at 50% and R ICA at 30 %. Patient was instructed to continue taking ASA 81 and increase Lipitor to 40 mg. Patient also instructed to follow up with Vascular Surgery and schedule an outpatient echocardiogram. Patient states slurred speech has happened at least two times in the past 24 hours. 2/19/19 episode lasted aprox 3 minutes while 2/20/19 episode lasted only a few seconds. Patient denies any vision changes, unilateral weakness, or numbness. On assessment patient back to baseline. NIH of 0. Considering patient with at least two episodes of dysarthria in the past 24 hours patient would benefit from inpatient TIA work up. CT head no acute intracranial findings. MRI brain with acute infarction within the right precentral gyrus and right superior temporal gyrus. Echo unremarkable. Will send patient home on 30 day cardiac event monitor. ASA 81 mg and atorvastatin 40 mg continued. Added Clopidogrel 75 mg to patient's regimen for secondary stroke prevention. Instructed patient to continue with vascular surgery appointment scheduled for 2/28/19.

## 2019-02-21 NOTE — PLAN OF CARE
Problem: Adult Inpatient Plan of Care  Goal: Plan of Care Review  Outcome: Ongoing (interventions implemented as appropriate)  Denies needs. No acute distress noted. Refer to flow sheets for full assessment.    Patient to be discharged. Signs/symtoms for reevaluation reviewed with patient. No questions at this time. Patient to leave with holter monitor. Denies needs. No acute distress noted. Patient to leave via ambulation and private car.

## 2019-02-28 ENCOUNTER — OFFICE VISIT (OUTPATIENT)
Dept: VASCULAR SURGERY | Facility: CLINIC | Age: 74
End: 2019-02-28
Attending: SURGERY
Payer: MEDICARE

## 2019-02-28 VITALS
HEART RATE: 72 BPM | BODY MASS INDEX: 23.03 KG/M2 | HEIGHT: 75 IN | WEIGHT: 185.19 LBS | DIASTOLIC BLOOD PRESSURE: 72 MMHG | SYSTOLIC BLOOD PRESSURE: 150 MMHG | TEMPERATURE: 98 F

## 2019-02-28 DIAGNOSIS — I65.22 CAROTID STENOSIS, ASYMPTOMATIC, LEFT: ICD-10-CM

## 2019-02-28 DIAGNOSIS — I63.511 ACUTE ISCHEMIC RIGHT MIDDLE CEREBRAL ARTERY (MCA) STROKE: Primary | ICD-10-CM

## 2019-02-28 PROCEDURE — 99999 PR PBB SHADOW E&M-EST. PATIENT-LVL III: ICD-10-PCS | Mod: PBBFAC,,, | Performed by: SURGERY

## 2019-02-28 PROCEDURE — 99204 OFFICE O/P NEW MOD 45 MIN: CPT | Mod: S$PBB,,, | Performed by: SURGERY

## 2019-02-28 PROCEDURE — 99204 PR OFFICE/OUTPT VISIT, NEW, LEVL IV, 45-59 MIN: ICD-10-PCS | Mod: S$PBB,,, | Performed by: SURGERY

## 2019-02-28 PROCEDURE — 99999 PR PBB SHADOW E&M-EST. PATIENT-LVL III: CPT | Mod: PBBFAC,,, | Performed by: SURGERY

## 2019-02-28 PROCEDURE — 99213 OFFICE O/P EST LOW 20 MIN: CPT | Mod: PBBFAC | Performed by: SURGERY

## 2019-02-28 NOTE — LETTER
March 8, 2019      João Davis MD  [address]  [facility] [address]           [facility] - Vascular Surgery  [address]  [facility] [address]  Phone: [phone]  Fax: [phone]          Patient: Klever Link   MR Number: [id number]   YOB: 1945   Date of Visit: 2/28/2019       Dear Dr. João Davis:    Thank you for referring Klever Link to me for evaluation. Attached you will find relevant portions of my assessment and plan of care.    If you have questions, please do not hesitate to call me. I look forward to following Klever Link along with you.    Sincerely,    Philomena Valdivia  CC:  No Recipients    If you would like to receive this communication electronically, please contact [email or web address] or [phone] to request more information on [email or web address] access.    For providers and/or their staff who would like to refer a patient to [facility], please contact us through our one-stop-shop provider referral line, [facility], at [phone].    If you feel you have received this communication in error or would no longer like to receive these types of communications, please e-mail [email or web address]

## 2019-02-28 NOTE — PROGRESS NOTES
Klever Parhamood  02/28/2019    HPI:  Patient is a 74 y.o. male with a h/o HTN, HLD, DMII who is here today for evaluation of slurred speech that began Monday 2/18 that lasted for several minutes and again Tuesday 2/19. He was admitted during this time and was released the 2/21. During these episodes, he reports no headache, visual disturbances, numbness or weakness to the extremities.  On initial Neurology assessment, his NIHSS was 0.     ASA, statin, plavix (started recently)  TIA 2019  Tobacco use: Former pack a day smoker, quit 2009.     Past Medical History:   Diagnosis Date    Hypertension      Past Surgical History:   Procedure Laterality Date    KNEE SURGERY Bilateral      Family History   Problem Relation Age of Onset    Diabetes Mother     Hypertension Father     Stroke Father     Diabetes Sister     Diabetes Sister     Diabetes Brother     Diabetes Sister     Diabetes Brother      Social History     Socioeconomic History    Marital status:      Spouse name: Not on file    Number of children: Not on file    Years of education: Not on file    Highest education level: Not on file   Social Needs    Financial resource strain: Not on file    Food insecurity - worry: Not on file    Food insecurity - inability: Not on file    Transportation needs - medical: Not on file    Transportation needs - non-medical: Not on file   Occupational History    Not on file   Tobacco Use    Smoking status: Former Smoker    Smokeless tobacco: Never Used   Substance and Sexual Activity    Alcohol use: Yes    Drug use: No    Sexual activity: Yes     Partners: Female   Other Topics Concern    Not on file   Social History Narrative    Not on file       Current Outpatient Medications:     ASPIR-LOW 81 mg EC tablet, , Disp: , Rfl:     atorvastatin (LIPITOR) 40 MG tablet, Take 1 tablet (40 mg total) by mouth once daily., Disp: 30 tablet, Rfl: 0    blood sugar diagnostic Strp, 1 strip by  Misc.(Non-Drug; Combo Route) route 3 (three) times daily., Disp: 100 strip, Rfl: 1    clopidogrel (PLAVIX) 75 mg tablet, Take 1 tablet (75 mg total) by mouth once daily., Disp: 30 tablet, Rfl: 11    hydrochlorothiazide (HYDRODIURIL) 25 MG tablet, Take 25 mg by mouth once daily., Disp: , Rfl:     lancets (ONE TOUCH DELICA LANCETS) 33 gauge Misc, 1 lancet by Misc.(Non-Drug; Combo Route) route 3 (three) times daily before meals., Disp: 100 each, Rfl: 1    lisinopril (PRINIVIL,ZESTRIL) 20 MG tablet, Take 20 mg by mouth once daily., Disp: , Rfl:     LUMIGAN 0.01 % Drop, , Disp: , Rfl:     metformin (GLUCOPHAGE) 500 MG tablet, , Disp: , Rfl:     SIMBRINZA 1-0.2 % DrpS, , Disp: , Rfl:     REVIEW OF SYSTEMS:  General: negative; ENT: negative; Allergy and Immunology: negative; Hematological and Lymphatic: Negative; Endocrine: negative; Respiratory: no cough, shortness of breath, or wheezing; Cardiovascular: no chest pain or dyspnea on exertion; Gastrointestinal: no abdominal pain/back, change in bowel habits, or bloody stools; Genito-Urinary: no dysuria, trouble voiding, or hematuria; Musculoskeletal: negative  Neurological: no TIA or stroke symptoms; Psychiatric: no nervousness, anxiety or depression.    PHYSICAL EXAM:   Right Arm BP - Sittin/61 (19 0842)  Left Arm BP - Sittin/72 (19 0842)  Pulse: 72  Temp: 97.9 °F (36.6 °C)      General appearance:  Alert, well-appearing, and in no distress.  Oriented to person, place, and time   Neurological: Normal speech, no focal findings noted; CN II - XII grossly intact           Musculoskeletal: Digits/nail without cyanosis/clubbing.  Normal muscle strength/tone.                 Neck: Supple, no significant adenopathy; thyroid is not enlarged                  No carotid bruit can be auscultated                Chest:  Clear to auscultation, no wheezes, rales or rhonchi, symmetric air entry     No use of accessory muscles             Cardiac: Normal rate  and regular rhythm, S1 and S2 normal; PMI non-displaced          Abdomen: Soft, nontender, nondistended, no masses or organomegaly     No rebound tenderness noted; bowel sounds normal     Pulsatile aortic mass is No palpable.     No groin adenopathy      Extremities:   2+ femoral pulses bilaterally     pedal pulses palpable.     No pre-tibial edema     No ulcerations    LAB RESULTS:  Lab Results   Component Value Date    K 3.8 02/21/2019    K 4.3 02/20/2019    K 4.8 02/19/2019    CREATININE 1.0 02/21/2019    CREATININE 1.2 02/20/2019    CREATININE 1.2 02/19/2019     Lab Results   Component Value Date    WBC 5.45 02/21/2019    WBC 6.54 02/20/2019    WBC 5.78 02/19/2019    HCT 48.8 02/21/2019    HCT 50.7 02/20/2019    HCT 50.1 02/19/2019     02/21/2019     02/20/2019     02/19/2019     Lab Results   Component Value Date    HGBA1C 7.6 (H) 02/20/2019    HGBA1C 13.5 (H) 02/12/2015     IMAGING:  CTA   Bilateral extracranial carotid disease which is worse on the left measuring up to the 50% stenosis.  Right internal carotid artery has 30% stenosis.    No large vessel occlusion intracranially.  Intracranial atherosclerotic disease is present.    MRI    Impression       Focus of acute infarction within the right precentral gyrus and right superior temporal gyrus.    Changes of chronic small vessel ischemic disease and cerebral volume loss.         IMP/PLAN:  74 y.o. male with recent R MCA stroke, ~30% R ICA stenosis and chronic small vessel ischemic disease and cerebral volume loss.  On detailed review of his CTA neck, his R ICA stenosis is minimal. He has at least a 70% stenosis of the left ICA.  He has diffuse small vessel intracranial atherosclerotic disease.  He is currently wearing a Holter 30 day monitor and had a recent echo that was negative for LA thrombus.  I do not feel intervention on the minimal R ICA stenosis is appropriate at this time.  His L ICA stenosis should be followed long term.      1) compelte Holter course and follow up with cardiology  2) follow up with Vascular Neurology per plan  3) continue asa, statin, plavix  4) RTC in 6 months for repeat carotid duplex    Konstantin Tucker MD FACS  Vascular/Endovascular Surgery

## 2019-03-27 ENCOUNTER — OFFICE VISIT (OUTPATIENT)
Dept: PODIATRY | Facility: CLINIC | Age: 74
End: 2019-03-27
Payer: MEDICARE

## 2019-03-27 VITALS
HEART RATE: 81 BPM | BODY MASS INDEX: 23.3 KG/M2 | SYSTOLIC BLOOD PRESSURE: 112 MMHG | WEIGHT: 187.38 LBS | RESPIRATION RATE: 18 BRPM | HEIGHT: 75 IN | DIASTOLIC BLOOD PRESSURE: 73 MMHG

## 2019-03-27 DIAGNOSIS — B35.3 TINEA PEDIS OF BOTH FEET: ICD-10-CM

## 2019-03-27 DIAGNOSIS — B35.1 ONYCHOMYCOSIS DUE TO DERMATOPHYTE: ICD-10-CM

## 2019-03-27 DIAGNOSIS — E11.49 TYPE II DIABETES MELLITUS WITH NEUROLOGICAL MANIFESTATIONS: Primary | ICD-10-CM

## 2019-03-27 PROCEDURE — 99213 PR OFFICE/OUTPT VISIT, EST, LEVL III, 20-29 MIN: ICD-10-PCS | Mod: 25,S$PBB,, | Performed by: PODIATRIST

## 2019-03-27 PROCEDURE — 99213 OFFICE O/P EST LOW 20 MIN: CPT | Mod: PBBFAC,25 | Performed by: PODIATRIST

## 2019-03-27 PROCEDURE — 99999 PR PBB SHADOW E&M-EST. PATIENT-LVL III: CPT | Mod: PBBFAC,,, | Performed by: PODIATRIST

## 2019-03-27 PROCEDURE — 11721 DEBRIDE NAIL 6 OR MORE: CPT | Mod: Q9,S$PBB,, | Performed by: PODIATRIST

## 2019-03-27 PROCEDURE — 11721 DEBRIDE NAIL 6 OR MORE: CPT | Mod: Q9,PBBFAC | Performed by: PODIATRIST

## 2019-03-27 PROCEDURE — 99213 OFFICE O/P EST LOW 20 MIN: CPT | Mod: 25,S$PBB,, | Performed by: PODIATRIST

## 2019-03-27 PROCEDURE — 11721 PR DEBRIDEMENT OF NAILS, 6 OR MORE: ICD-10-PCS | Mod: Q9,S$PBB,, | Performed by: PODIATRIST

## 2019-03-27 PROCEDURE — 99999 PR PBB SHADOW E&M-EST. PATIENT-LVL III: ICD-10-PCS | Mod: PBBFAC,,, | Performed by: PODIATRIST

## 2019-03-27 RX ORDER — KETOCONAZOLE 20 MG/G
CREAM TOPICAL DAILY
Qty: 60 G | Refills: 3 | Status: SHIPPED | OUTPATIENT
Start: 2019-03-27 | End: 2019-12-17

## 2019-03-27 RX ORDER — CLOPIDOGREL BISULFATE 75 MG/1
1 TABLET ORAL
COMMUNITY
End: 2019-03-27 | Stop reason: SDUPTHER

## 2019-03-27 RX ORDER — ATORVASTATIN CALCIUM 40 MG/1
1 TABLET, FILM COATED ORAL
COMMUNITY
Start: 2019-03-06 | End: 2020-12-05

## 2019-03-27 RX ORDER — METFORMIN HYDROCHLORIDE 500 MG/1
1 TABLET ORAL
COMMUNITY
Start: 2019-02-27 | End: 2020-10-01

## 2019-03-27 NOTE — PROGRESS NOTES
"Subjective:      Patient ID: Klever Link is a 74 y.o. male.    Chief Complaint: PCP (PETRONA SHI/GENIE 4/15/19); Diabetic Foot Exam; Nail Problem; and Nail Care    Klever is a 74 y.o. male who presents to the clinic for evaluation and treatment of high risk feet. Klever has a past medical history of Hypertension. The patient's chief complaint is long, thick toenails. This patient has documented high risk feet requiring routine maintenance secondary to peripheral vascular disease.    PCP: Petrona Shi MD    Date Last Seen by PCP:   Chief Complaint   Patient presents with    PCP     PETRONA SHI/GENIE 4/15/19    Diabetic Foot Exam    Nail Problem    Nail Care       Current shoe gear:  Casual shoes      Hemoglobin A1C   Date Value Ref Range Status   02/20/2019 7.6 (H) 4.0 - 5.6 % Final     Comment:     ADA Screening Guidelines:  5.7-6.4%  Consistent with prediabetes  >or=6.5%  Consistent with diabetes  High levels of fetal hemoglobin interfere with the HbA1C  assay. Heterozygous hemoglobin variants (HbS, HgC, etc)do  not significantly interfere with this assay.   However, presence of multiple variants may affect accuracy.     02/12/2015 13.5 (H) 4.5 - 6.2 % Final       Review of Systems   Constitution: Negative for chills, decreased appetite and fever.   Cardiovascular: Negative for leg swelling.   Skin: Positive for dry skin, itching and nail changes. Negative for color change, flushing, rash, skin cancer and suspicious lesions.   Musculoskeletal: Negative for arthritis, back pain, joint pain, joint swelling and myalgias.   Gastrointestinal: Negative for nausea and vomiting.   Neurological: Positive for numbness. Negative for loss of balance and paresthesias.           Objective:       Vitals:    03/27/19 0905   BP: 112/73   Pulse: 81   Resp: 18   Weight: 85 kg (187 lb 6.3 oz)   Height: 6' 3" (1.905 m)   PainSc: 0-No pain        Physical Exam   Constitutional: He is oriented to person, " place, and time. He appears well-developed and well-nourished.   Cardiovascular:   Dorsalis pedis and posterior tibial pulses are diminished Bilaterally. Toes are cool to touch. Feet are warm proximally.There is decreased digital hair. Skin is atrophic, slightly hyperpigmented, and mildly edematous       Musculoskeletal: Normal range of motion. He exhibits no edema or tenderness.   Adequate joint range of motion without pain, limitation, nor crepitation Bilateral feet and ankle joints. Muscle strength is 5/5 in all groups bilaterally.         Neurological: He is alert and oriented to person, place, and time.   [place]-Alex 5.07 monofilamant testing is diminished Dheeraj feet. Sharp/dull sensation diminished Bilaterally. Light touch absent Bilaterally.       Skin: Skin is warm, dry and intact. No abrasion, no bruising, no ecchymosis and no lesion noted. He is not diaphoretic. No erythema. No pallor.   Nails x 10  are elongated by  3-4mm's, thickened by 2-4 mm's, dystrophic, and are darkened in  coloration . Xerosis Bilaterally. No open lesions noted.    Scaling dryness in a moccasin distribution is noted to the bilateral lower extremities with associated erythema.       Psychiatric: He has a normal mood and affect. His behavior is normal.   Nursing note and vitals reviewed.            Assessment:       Encounter Diagnoses   Name Primary?    Type II diabetes mellitus with neurological manifestations Yes    Onychomycosis due to dermatophyte     Corn or callus     Tinea pedis of both feet          Plan:       Klever was seen today for pcp, diabetic foot exam, nail problem and nail care.    Diagnoses and all orders for this visit:    Type II diabetes mellitus with neurological manifestations    Onychomycosis due to dermatophyte    Corn or callus    Tinea pedis of both feet    Other orders  -     ketoconazole (NIZORAL) 2 % cream; Apply topically once daily.      I counseled the patient on his conditions, their  implications and medical management.        - Shoe inspection. Diabetic Foot Education. Patient reminded of the importance of good nutrition and blood sugar control to help prevent podiatric complications of diabetes. Patient instructed on proper foot hygeine. We discussed wearing proper shoe gear, daily foot inspections, never walking without protective shoe gear, never putting sharp instruments to feet, routine podiatric nail visits every 2-3 months.      - With patient's permission, nails were aggressively reduced and debrided x 10 to their soft tissue attachment mechanically and with electric , removing all offending nail and debris. Patient relates relief following the procedure. He will continue to monitor the areas daily, inspect his feet, wear protective shoe gear when ambulatory, moisturizer to maintain skin integrity and follow in this office in approximately 2-3 months, sooner p.r.n.      Advised the patient that fungus likes warm, dark, and moist environments such as bathrooms, gyms, and pools. Advised to spray shower, shower mat , and any shoes w/ Lysol periodically

## 2019-03-28 ENCOUNTER — OFFICE VISIT (OUTPATIENT)
Dept: NEUROLOGY | Facility: CLINIC | Age: 74
End: 2019-03-28
Payer: MEDICARE

## 2019-03-28 VITALS
SYSTOLIC BLOOD PRESSURE: 112 MMHG | DIASTOLIC BLOOD PRESSURE: 73 MMHG | WEIGHT: 187 LBS | BODY MASS INDEX: 23.25 KG/M2 | HEIGHT: 75 IN

## 2019-03-28 DIAGNOSIS — Z86.73 HISTORY OF STROKE WITHOUT RESIDUAL DEFICITS: ICD-10-CM

## 2019-03-28 DIAGNOSIS — E11.9 TYPE 2 DIABETES MELLITUS WITHOUT COMPLICATION, WITHOUT LONG-TERM CURRENT USE OF INSULIN: Primary | ICD-10-CM

## 2019-03-28 DIAGNOSIS — I65.22 CAROTID STENOSIS, ASYMPTOMATIC, LEFT: ICD-10-CM

## 2019-03-28 DIAGNOSIS — I10 ESSENTIAL HYPERTENSION: ICD-10-CM

## 2019-03-28 PROBLEM — R47.1 DYSARTHRIA: Status: RESOLVED | Noted: 2019-02-19 | Resolved: 2019-03-28

## 2019-03-28 PROCEDURE — 99999 PR PBB SHADOW E&M-EST. PATIENT-LVL III: ICD-10-PCS | Mod: PBBFAC,,, | Performed by: PSYCHIATRY & NEUROLOGY

## 2019-03-28 PROCEDURE — 99213 OFFICE O/P EST LOW 20 MIN: CPT | Mod: PBBFAC | Performed by: PSYCHIATRY & NEUROLOGY

## 2019-03-28 PROCEDURE — 99215 OFFICE O/P EST HI 40 MIN: CPT | Mod: S$PBB,,, | Performed by: PSYCHIATRY & NEUROLOGY

## 2019-03-28 PROCEDURE — 99999 PR PBB SHADOW E&M-EST. PATIENT-LVL III: CPT | Mod: PBBFAC,,, | Performed by: PSYCHIATRY & NEUROLOGY

## 2019-03-28 PROCEDURE — 99215 PR OFFICE/OUTPT VISIT, EST, LEVL V, 40-54 MIN: ICD-10-PCS | Mod: S$PBB,,, | Performed by: PSYCHIATRY & NEUROLOGY

## 2019-03-28 NOTE — PROGRESS NOTES
Vascular Neurology  Clinic Note    Reason For Visit (Chief Complaint): stroke followup    HPI: Mr. Link is a  74 y.o. man with HTN, T2DM, admission in Feb for dysarthria, here for followup for embolic stroke.    Briefly, Mr. Link first presented for brief episode of isolated dysarthria on 2/19.  He was discharged after CTA H/N was negative for high grade stenosis.  Plan was for outpatient TIA workup.  He represented the following day with another episode of slurred speech.  This time MRI was performed and showed small infarct in R pre-central gyrus.  He was admitted overnight and Plavix added to regimen.  TTE was unremarkable.  He had no deficits at time of discharge.    Mr. Link has been doing well since discharge, with no further episodes of slurred speech.  He continues on aspirin/plavix/statin.  He had followup with vascular surgery (Dr. Tucker) for evaluation of L-ICA stenosis (~70%) -- plan is to monitor annually.  He is doing calisthenics and getting exercise mowing the grass.  He reports heading healthy diet, but does like steak.  He completed cardiac monitor and sent it back to the company on Monday (report pending).  He has noted chronic numbness in fingers in both hands for several years.      Brain Imaging:  MRI Brain 2/20/19:  Focus of acute infarction within the right precentral gyrus and right superior temporal gyrus.  Changes of chronic small vessel ischemic disease and cerebral volume loss.    CTA H/N 2/19/19:  Bilateral extracranial carotid disease which is worse on the left measuring up to the 50% stenosis.  Right internal carotid artery has 30% stenosis.  No large vessel occlusion intracranially.  Intracranial atherosclerotic disease is present.  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  Patient has prominent posterior communicating arteries bilaterally.    Cardiac Evaluation:  Results for orders placed or performed during the hospital  "encounter of 02/12/15   2D echo with color flow doppler   Result Value Ref Range    QEF 60 55 - 65    Diastolic Dysfunction No     Est. PA Systolic Pressure 18.05     Pericardial Effusion NONE    NL LA size      Relevant Labwork:  Recent Labs   Lab 02/20/19  1235   HEMOGLOBIN A1C 7.6 H   LDL CHOLESTEROL 99.2   HDL 44   TRIGLYCERIDES 114   CHOLESTEROL 166       I independently viewed the above imaging studies and  I reviewed the reports of the above imaging.  I reviewed the above labwork.    Review of Systems  Msk: negative for muscle pain  Skin: negative for pruritis  Neuro: negative for headache  All others negative    Past Medical History  Past Medical History:   Diagnosis Date    Expressive aphasia 2/12/2015    Hypertension      Family History  Father with stroke.    Social History  Quit smoking ~6 years ago.  1 ppd previously.  Previously worked for [employer]      Medication List with Changes/Refills   Current Medications    ASPIR-LOW 81 MG EC TABLET        ATORVASTATIN (LIPITOR) 40 MG TABLET    Take 1 tablet by mouth.    BLOOD SUGAR DIAGNOSTIC STRP    1 strip by Misc.(Non-Drug; Combo Route) route 3 (three) times daily.    CLOPIDOGREL (PLAVIX) 75 MG TABLET    Take 1 tablet (75 mg total) by mouth once daily.    HYDROCHLOROTHIAZIDE (HYDRODIURIL) 25 MG TABLET    Take 25 mg by mouth once daily.    KETOCONAZOLE (NIZORAL) 2 % CREAM    Apply topically once daily.    LANCETS (ONE TOUCH DELICA LANCETS) 33 GAUGE MISC    1 lancet by Misc.(Non-Drug; Combo Route) route 3 (three) times daily before meals.    LISINOPRIL (PRINIVIL,ZESTRIL) 20 MG TABLET    Take 20 mg by mouth once daily.    LUMIGAN 0.01 % DROP        METFORMIN (GLUCOPHAGE) 500 MG TABLET        METFORMIN (GLUCOPHAGE) 500 MG TABLET    Take 1 tablet by mouth.    SIMBRINZA 1-0.2 % DRPS           EXAM  Vital Signs  Vital Signs  BP: 112/73  Pain Score: 0-No pain  Height and Weight  Height: 6' 3" (190.5 cm)  Weight: 84.8 kg (187 lb)  BSA (Calculated - sq m): 2.12 sq " meters  BMI (Calculated): 23.4  Weight in (lb) to have BMI = 25: 199.6]    General: well appearing without discomfort   Neck: no carotid bruits  CV: RRR, nL S1&S2  Resp: breathing comfortably, no wheezing  Ext: wwp, no pedal edema    Mental Status: Alert and oriented, normal attention, speech fluent and prosodic, naming and repetition intact, follows multistep embedded commands, no e/o neglect or extinction  Cranial Nerves: PERRL, EOMI, VFF, sensation intact, face symmetric, TUP midline, SCM/trap 5/5  Motor: Normal bulk and tone, no drift, finger taps symmetric  Strength 5/5 throughout  Sensory: decr sensation in b/l fingertips  Coordination: no ataxia on finger-to-nose  (intention tremor b/l)  Gait & Stance: normal  DTR: unable to elicit DTR    NIHSS - 0    ___________________  ASSESSMENT & PLAN  Mr. Link is a  74 y.o. man with HTN, T2DM, admission in Feb for dysarthria, here for followup for embolic stroke.  Suspect embolic source of stroke, but no significant ipsilateral stenosis.  Will follow-up cardiac even monitor results and likely proceed with implantable loop recorder for prolonged monitoring.  L-ICA stenosis should be imaged annually.    - Completed 30d Plavix.  Then aspirin 81 monotherapy indefinitely.  - Continue atorvastatin 40 mg daily for HLD/athero.  - Keep DM and BP well controlled.  - Annual carotid doppler.  - Discussed exercise and Mediterranean diet for stroke prevention.  - RTC 6 mos.    Problem List Items Addressed This Visit        Unprioritized    DM type 2 (diabetes mellitus, type 2) - Primary    Hypertension    History of stroke without residual deficits    Carotid stenosis, asymptomatic, left          Rose Rocha MD  Vascular Neurology

## 2019-03-28 NOTE — PATIENT INSTRUCTIONS
- Complete 1 month course of Plavix.  Continue on aspirin 81 mg daily indefinitely.  - Continue atorvastatin for cholesterol.  - Keep Diabetes well controlled.      Mediterranean Diet Recommendations  · Eat primarily plant-based foods, such as fruits and vegetables, whole grains, legumes (beans) and nuts.  · Limit refined carbohydrates (white pasta, bread, rice).  · Replace butter with healthy fats such as olive oil.  · Use herbs and spices instead of salt to flavor foods.  · Limit red meat and processed meats to no more than a few times a month.  · Avoid sugary sodas, bakery goods, and sweets.  · Eat fish and poultry at least twice a week.  · Get plenty of exercise (150 minutes per week).  Adopted from Arianne potts al, NEJM, 2018.

## 2019-04-08 ENCOUNTER — TELEPHONE (OUTPATIENT)
Dept: PODIATRY | Facility: CLINIC | Age: 74
End: 2019-04-08

## 2019-04-08 NOTE — TELEPHONE ENCOUNTER
Called patient and he states he had his nails clip on 27th last month and now he has an infected toe that the Dr. Need to look at or evaluate for possible infection, schedule appointment for 4/9/19 at 8:30am.

## 2019-04-08 NOTE — TELEPHONE ENCOUNTER
----- Message from Jen Dumont sent at 4/8/2019 10:02 AM CDT -----  Contact: [email or web address]  Patient has an infected toe patient is a diabetic and would like to be seen today are tomorrow.

## 2019-04-09 ENCOUNTER — OFFICE VISIT (OUTPATIENT)
Dept: PODIATRY | Facility: CLINIC | Age: 74
End: 2019-04-09
Payer: MEDICARE

## 2019-04-09 VITALS
SYSTOLIC BLOOD PRESSURE: 126 MMHG | HEIGHT: 76 IN | WEIGHT: 187 LBS | DIASTOLIC BLOOD PRESSURE: 76 MMHG | HEART RATE: 79 BPM | BODY MASS INDEX: 22.77 KG/M2

## 2019-04-09 DIAGNOSIS — M79.675 PAIN OF TOE OF LEFT FOOT: Primary | ICD-10-CM

## 2019-04-09 PROCEDURE — 99999 PR PBB SHADOW E&M-EST. PATIENT-LVL III: ICD-10-PCS | Mod: PBBFAC,,, | Performed by: PODIATRIST

## 2019-04-09 PROCEDURE — 99999 PR PBB SHADOW E&M-EST. PATIENT-LVL III: CPT | Mod: PBBFAC,,, | Performed by: PODIATRIST

## 2019-04-09 PROCEDURE — 99213 OFFICE O/P EST LOW 20 MIN: CPT | Mod: S$PBB,,, | Performed by: PODIATRIST

## 2019-04-09 PROCEDURE — 99213 PR OFFICE/OUTPT VISIT, EST, LEVL III, 20-29 MIN: ICD-10-PCS | Mod: S$PBB,,, | Performed by: PODIATRIST

## 2019-04-09 PROCEDURE — 99213 OFFICE O/P EST LOW 20 MIN: CPT | Mod: PBBFAC | Performed by: PODIATRIST

## 2019-04-16 NOTE — PROGRESS NOTES
Subjective:      Patient ID: Klever Link is a 74 y.o. male.    Chief Complaint: Diabetes Mellitus (dr petrona welch 04/10/2019) and Diabetic Foot Exam    Klever is a 74 y.o. male who presents to the clinic for evaluation and treatment of high risk feet. Klever has a past medical history of Expressive aphasia (2/12/2015) and Hypertension. The patient's chief complaint is long, thick toenails. This patient has documented high risk feet requiring routine maintenance secondary to peripheral vascular disease.    4/9/2019: Pt states that after his previous visit, his left 2nd digit began to hurt. He is concerned of an ingrown nail. Pt states it feels better today and is not painful.     PCP: Petrona Welch MD    Date Last Seen by PCP:   Chief Complaint   Patient presents with    Diabetes Mellitus     dr petrona welch 04/10/2019    Diabetic Foot Exam       Current shoe gear:  Casual shoes      Hemoglobin A1C   Date Value Ref Range Status   02/20/2019 7.6 (H) 4.0 - 5.6 % Final     Comment:     ADA Screening Guidelines:  5.7-6.4%  Consistent with prediabetes  >or=6.5%  Consistent with diabetes  High levels of fetal hemoglobin interfere with the HbA1C  assay. Heterozygous hemoglobin variants (HbS, HgC, etc)do  not significantly interfere with this assay.   However, presence of multiple variants may affect accuracy.     02/12/2015 13.5 (H) 4.5 - 6.2 % Final       Review of Systems   Constitution: Negative for chills, decreased appetite and fever.   Cardiovascular: Negative for leg swelling.   Skin: Positive for dry skin, itching and nail changes. Negative for color change, flushing, rash, skin cancer and suspicious lesions.   Musculoskeletal: Negative for arthritis, back pain, joint pain, joint swelling and myalgias.   Gastrointestinal: Negative for nausea and vomiting.   Neurological: Positive for numbness. Negative for loss of balance and paresthesias.           Objective:       Vitals:    04/09/19 0818  "  BP: 126/76   Pulse: 79   Weight: 84.8 kg (187 lb)   Height: 6' 3.6" (1.92 m)   PainSc:   2        Physical Exam   Constitutional: He is oriented to person, place, and time. He appears well-developed and well-nourished.   Cardiovascular:   Dorsalis pedis and posterior tibial pulses are diminished Bilaterally. Toes are cool to touch. Feet are warm proximally.There is decreased digital hair. Skin is atrophic, slightly hyperpigmented, and mildly edematous       Musculoskeletal: Normal range of motion. He exhibits no edema or tenderness.   Adequate joint range of motion without pain, limitation, nor crepitation Bilateral feet and ankle joints. Muscle strength is 5/5 in all groups bilaterally.         Neurological: He is alert and oriented to person, place, and time.   [place] 5.07 monofilamant testing is diminished Dheeraj feet. Sharp/dull sensation diminished Bilaterally. Light touch absent Bilaterally.       Skin: Skin is warm, dry and intact. No abrasion, no bruising, no ecchymosis and no lesion noted. He is not diaphoretic. No erythema. No pallor.   Nails x 10  are short and dystrophic    Left 2nd digit nail intact, no breaks in skin noted. No erythema.    Psychiatric: He has a normal mood and affect. His behavior is normal.   Nursing note and vitals reviewed.            Assessment:       Encounter Diagnosis   Name Primary?    Pain of toe of left foot Yes         Plan:       Klever was seen today for diabetes mellitus and diabetic foot exam.    Diagnoses and all orders for this visit:    Pain of toe of left foot      I counseled the patient on his conditions, their implications and medical management.        Pt advised that there were no ingrown nails or breaks in skin noted.   Toe sleeve dispensed to be worn in shoes.   Call or return to clinic prn if these symptoms worsen or fail to improve as anticipated.    "

## 2019-07-02 ENCOUNTER — OFFICE VISIT (OUTPATIENT)
Dept: PODIATRY | Facility: CLINIC | Age: 74
End: 2019-07-02
Payer: MEDICARE

## 2019-07-02 VITALS
BODY MASS INDEX: 23.25 KG/M2 | SYSTOLIC BLOOD PRESSURE: 143 MMHG | HEIGHT: 75 IN | DIASTOLIC BLOOD PRESSURE: 81 MMHG | HEART RATE: 63 BPM | WEIGHT: 187 LBS

## 2019-07-02 DIAGNOSIS — L84 CORN OR CALLUS: ICD-10-CM

## 2019-07-02 DIAGNOSIS — B35.1 ONYCHOMYCOSIS DUE TO DERMATOPHYTE: ICD-10-CM

## 2019-07-02 DIAGNOSIS — E11.49 TYPE II DIABETES MELLITUS WITH NEUROLOGICAL MANIFESTATIONS: Primary | ICD-10-CM

## 2019-07-02 PROCEDURE — 99213 OFFICE O/P EST LOW 20 MIN: CPT | Mod: PBBFAC | Performed by: PODIATRIST

## 2019-07-02 PROCEDURE — 11056 PR TRIM BENIGN HYPERKERATOTIC SKIN LESION,2-4: ICD-10-PCS | Mod: Q9,S$PBB,, | Performed by: PODIATRIST

## 2019-07-02 PROCEDURE — 11721 DEBRIDE NAIL 6 OR MORE: CPT | Mod: 59,Q9,S$PBB, | Performed by: PODIATRIST

## 2019-07-02 PROCEDURE — 99999 PR PBB SHADOW E&M-EST. PATIENT-LVL III: CPT | Mod: PBBFAC,,, | Performed by: PODIATRIST

## 2019-07-02 PROCEDURE — 99499 UNLISTED E&M SERVICE: CPT | Mod: S$PBB,,, | Performed by: PODIATRIST

## 2019-07-02 PROCEDURE — 99999 PR PBB SHADOW E&M-EST. PATIENT-LVL III: ICD-10-PCS | Mod: PBBFAC,,, | Performed by: PODIATRIST

## 2019-07-02 PROCEDURE — 99499 NO LOS: ICD-10-PCS | Mod: S$PBB,,, | Performed by: PODIATRIST

## 2019-07-02 PROCEDURE — 11056 PARNG/CUTG B9 HYPRKR LES 2-4: CPT | Mod: Q9,S$PBB,, | Performed by: PODIATRIST

## 2019-07-02 PROCEDURE — 11721 DEBRIDE NAIL 6 OR MORE: CPT | Mod: Q9,PBBFAC,59 | Performed by: PODIATRIST

## 2019-07-02 PROCEDURE — 11056 PARNG/CUTG B9 HYPRKR LES 2-4: CPT | Mod: 59,Q9,PBBFAC | Performed by: PODIATRIST

## 2019-07-02 PROCEDURE — 11721 PR DEBRIDEMENT OF NAILS, 6 OR MORE: ICD-10-PCS | Mod: 59,Q9,S$PBB, | Performed by: PODIATRIST

## 2019-07-03 NOTE — PROGRESS NOTES
"Subjective:      Patient ID: Klever Link is a 74 y.o. male.    Chief Complaint: Diabetes Mellitus (dr petrona welch 7/17/19) and Nail Care    Klever is a 74 y.o. male who presents to the clinic for evaluation and treatment of high risk feet. Klever has a past medical history of Expressive aphasia (2/12/2015) and Hypertension. The patient's chief complaint is long, thick toenails. This patient has documented high risk feet requiring routine maintenance secondary to peripheral vascular disease.    PCP: Petrona Welch MD    Date Last Seen by PCP:   Chief Complaint   Patient presents with    Diabetes Mellitus     dr petrona welch 7/17/19    Nail Care       Current shoe gear:  Casual shoes      Hemoglobin A1C   Date Value Ref Range Status   02/20/2019 7.6 (H) 4.0 - 5.6 % Final     Comment:     ADA Screening Guidelines:  5.7-6.4%  Consistent with prediabetes  >or=6.5%  Consistent with diabetes  High levels of fetal hemoglobin interfere with the HbA1C  assay. Heterozygous hemoglobin variants (HbS, HgC, etc)do  not significantly interfere with this assay.   However, presence of multiple variants may affect accuracy.     02/12/2015 13.5 (H) 4.5 - 6.2 % Final       Review of Systems   Constitution: Negative for chills, decreased appetite and fever.   Cardiovascular: Negative for leg swelling.   Skin: Positive for dry skin, itching and nail changes. Negative for color change, flushing, poor wound healing, rash, skin cancer and suspicious lesions.   Musculoskeletal: Negative for arthritis, back pain, joint pain, joint swelling and myalgias.   Gastrointestinal: Negative for nausea and vomiting.   Neurological: Positive for numbness. Negative for loss of balance and paresthesias.           Objective:       Vitals:    07/02/19 0916   BP: (!) 143/81   Pulse: 63   Weight: 84.8 kg (187 lb)   Height: 6' 3" (1.905 m)   PainSc: 0-No pain        Physical Exam   Constitutional: He is oriented to person, place, and time. " He appears well-developed and well-nourished.   Cardiovascular:   Dorsalis pedis and posterior tibial pulses are diminished Bilaterally. Toes are cool to touch. Feet are warm proximally.There is decreased digital hair. Skin is atrophic, slightly hyperpigmented, and mildly edematous       Musculoskeletal: Normal range of motion. He exhibits no edema or tenderness.   Adequate joint range of motion without pain, limitation, nor crepitation Bilateral feet and ankle joints. Muscle strength is 5/5 in all groups bilaterally.         Neurological: He is alert and oriented to person, place, and time.   [place]-Alex 5.07 monofilamant testing is diminished Dheeraj feet. Sharp/dull sensation diminished Bilaterally. Light touch absent Bilaterally.       Skin: Skin is warm, dry and intact. No abrasion, no bruising, no ecchymosis and no lesion noted. He is not diaphoretic. No erythema. No pallor.   Nails x 10  are elongated by  4-6mm's, thickened by 2-4 mm's, dystrophic, and are darkened in  coloration . Xerosis Bilaterally. No open lesions noted.    Hyperkeratotic tissue noted to distal hallux b.l     Psychiatric: He has a normal mood and affect. His behavior is normal.   Nursing note and vitals reviewed.            Assessment:       Encounter Diagnoses   Name Primary?    Type II diabetes mellitus with neurological manifestations Yes    Onychomycosis due to dermatophyte     Corn or callus          Plan:       Klever was seen today for diabetes mellitus and nail care.    Diagnoses and all orders for this visit:    Type II diabetes mellitus with neurological manifestations    Onychomycosis due to dermatophyte    Corn or callus      I counseled the patient on his conditions, their implications and medical management.        - Shoe inspection. Diabetic Foot Education. Patient reminded of the importance of good nutrition and blood sugar control to help prevent podiatric complications of diabetes. Patient instructed on proper foot  hygeine. We discussed wearing proper shoe gear, daily foot inspections, never walking without protective shoe gear, never putting sharp instruments to feet, routine podiatric nail visits every 2-3 months.      - With patient's permission, nails were aggressively reduced and debrided x 10 to their soft tissue attachment mechanically and with electric , removing all offending nail and debris. Patient relates relief following the procedure. He will continue to monitor the areas daily, inspect his feet, wear protective shoe gear when ambulatory, moisturizer to maintain skin integrity and follow in this office in approximately 2-3 months, sooner p.r.n.      - After cleansing the  area w/ alcohol prep pad the above mentioned hyperkeratosis was trimmed utilizing No 15 scapel, to a smooth base with out incident. Patient tolerated this  well and reported comfort to the area of distal hallux b/l

## 2019-07-22 ENCOUNTER — OFFICE VISIT (OUTPATIENT)
Dept: CARDIOLOGY | Facility: CLINIC | Age: 74
End: 2019-07-22
Payer: MEDICARE

## 2019-07-22 VITALS
WEIGHT: 185.63 LBS | HEART RATE: 87 BPM | BODY MASS INDEX: 23.08 KG/M2 | HEIGHT: 75 IN | DIASTOLIC BLOOD PRESSURE: 75 MMHG | SYSTOLIC BLOOD PRESSURE: 128 MMHG

## 2019-07-22 DIAGNOSIS — E78.1 HYPERTRIGLYCERIDEMIA: ICD-10-CM

## 2019-07-22 DIAGNOSIS — R00.2 PALPITATIONS: ICD-10-CM

## 2019-07-22 DIAGNOSIS — Z86.73 HISTORY OF STROKE WITHOUT RESIDUAL DEFICITS: ICD-10-CM

## 2019-07-22 DIAGNOSIS — I65.22 CAROTID STENOSIS, ASYMPTOMATIC, LEFT: ICD-10-CM

## 2019-07-22 DIAGNOSIS — I10 ESSENTIAL HYPERTENSION: Primary | ICD-10-CM

## 2019-07-22 DIAGNOSIS — E78.2 MIXED HYPERLIPIDEMIA: ICD-10-CM

## 2019-07-22 PROCEDURE — 99213 OFFICE O/P EST LOW 20 MIN: CPT | Mod: PBBFAC,PN,25 | Performed by: INTERNAL MEDICINE

## 2019-07-22 PROCEDURE — 99999 PR PBB SHADOW E&M-EST. PATIENT-LVL III: CPT | Mod: PBBFAC,,, | Performed by: INTERNAL MEDICINE

## 2019-07-22 PROCEDURE — 93010 EKG 12-LEAD: ICD-10-PCS | Mod: S$PBB,,, | Performed by: INTERNAL MEDICINE

## 2019-07-22 PROCEDURE — 93010 ELECTROCARDIOGRAM REPORT: CPT | Mod: S$PBB,,, | Performed by: INTERNAL MEDICINE

## 2019-07-22 PROCEDURE — 99205 PR OFFICE/OUTPT VISIT, NEW, LEVL V, 60-74 MIN: ICD-10-PCS | Mod: S$PBB,,, | Performed by: INTERNAL MEDICINE

## 2019-07-22 PROCEDURE — 99205 OFFICE O/P NEW HI 60 MIN: CPT | Mod: S$PBB,,, | Performed by: INTERNAL MEDICINE

## 2019-07-22 PROCEDURE — 99999 PR PBB SHADOW E&M-EST. PATIENT-LVL III: ICD-10-PCS | Mod: PBBFAC,,, | Performed by: INTERNAL MEDICINE

## 2019-07-22 PROCEDURE — 93005 ELECTROCARDIOGRAM TRACING: CPT | Mod: PBBFAC,PN | Performed by: INTERNAL MEDICINE

## 2019-07-22 RX ORDER — BRINZOLAMIDE 10 MG/ML
SUSPENSION/ DROPS OPHTHALMIC 3 TIMES DAILY
COMMUNITY
Start: 2019-06-18

## 2019-07-22 NOTE — LETTER
July 22, 2019      Vanessa Addison, DPM  [address] [facility] [address]           [facility]  [address], [address]  [facility] [address]  Phone: [phone]  Fax: [phone]          Patient: Klever Link   MR Number: [id number]   YOB: 1945   Date of Visit: 7/22/2019       Dear Dr. Vanessa Addison:    Thank you for referring Klever iLnk to me for evaluation. Attached you will find relevant portions of my assessment and plan of care.    If you have questions, please do not hesitate to call me. I look forward to following Klever Link along with you.    Sincerely,    Gus Martinez MD    Enclosure  CC:  No Recipients    If you would like to receive this communication electronically, please contact [email or web address] or [phone] to request more information on [facility] access.    For providers and/or their staff who would like to refer a patient to [facility], please contact us through our one-stop-shop provider referral line, [facility], at [phone].    If you feel you have received this communication in error or would no longer like to receive these types of communications, please e-mail [email or web address]

## 2019-07-22 NOTE — PROGRESS NOTES
"  Subjective:      Patient ID: Klever Link is a 74 y.o. male.    Chief Complaint: Chest Pain (rapid heart beat possibly from eye drops) and Transient Ischemic Attack (Feb 2019)    HPI:  Pt reports rapid heart beat for 2 or 3 seconds when using Simbrinza eye drops which only happened once about a month ago.     Pt walks one-half to one mile 3 d a week    Pt mows the lawn without any symptoms, without any difficulty    Review of Systems   Cardiovascular: Positive for chest pain (rare "small cramp" in left anterior chest which lasts a second) and palpitations. Negative for claudication, dyspnea on exertion, irregular heartbeat, leg swelling, near-syncope, orthopnea and syncope.          Pt was diagnosed with stroke in February due to transient slurred speech.  Pt was instructed to take Plavix for a month.    Sugars are OK. "I start shaking when my glucose is under 100."  Past Medical History:   Diagnosis Date    Diabetes mellitus     Expressive aphasia 2/12/2015    Hyperlipidemia     Hypertension     Stroke     TIA (transient ischemic attack) 02/2019        Past Surgical History:   Procedure Laterality Date    KNEE SURGERY Bilateral        Family History   Problem Relation Age of Onset    Diabetes Mother     Ovarian cancer Mother     Hypertension Father     Stroke Father     Diabetes Sister     Diabetes Sister     Diabetes Sister     Stroke Sister        Social History     Socioeconomic History    Marital status:      Spouse name: Not on file    Number of children: Not on file    Years of education: Not on file    Highest education level: Not on file   Occupational History    Not on file   Social Needs    Financial resource strain: Not on file    Food insecurity:     Worry: Not on file     Inability: Not on file    Transportation needs:     Medical: Not on file     Non-medical: Not on file   Tobacco Use    Smoking status: Former Smoker     Packs/day: 1.00     Years: 0.00     Pack " years: 0.00     Start date: 1965     Last attempt to quit: 2008     Years since quittin.0    Smokeless tobacco: Never Used   Substance and Sexual Activity    Alcohol use: Not Currently    Drug use: No    Sexual activity: Yes     Partners: Female   Lifestyle    Physical activity:     Days per week: Not on file     Minutes per session: Not on file    Stress: Not on file   Relationships    Social connections:     Talks on phone: Not on file     Gets together: Not on file     Attends Buddhist service: Not on file     Active member of club or organization: Not on file     Attends meetings of clubs or organizations: Not on file     Relationship status: Not on file   Other Topics Concern    Not on file   Social History Narrative    Not on file       Current Outpatient Medications on File Prior to Visit   Medication Sig Dispense Refill    ASPIR-LOW 81 mg EC tablet       atorvastatin (LIPITOR) 40 MG tablet Take 1 tablet by mouth.      AZOPT 1 % ophthalmic suspension       blood sugar diagnostic Strp 1 strip by Misc.(Non-Drug; Combo Route) route 3 (three) times daily. 100 strip 1    hydrochlorothiazide (HYDRODIURIL) 25 MG tablet Take 25 mg by mouth once daily.      ketoconazole (NIZORAL) 2 % cream Apply topically once daily. 60 g 3    lancets (ONE TOUCH DELICA LANCETS) 33 gauge Misc 1 lancet by Misc.(Non-Drug; Combo Route) route 3 (three) times daily before meals. 100 each 1    lisinopril (PRINIVIL,ZESTRIL) 20 MG tablet Take 20 mg by mouth once daily.      LUMIGAN 0.01 % Drop       metFORMIN (GLUCOPHAGE) 500 MG tablet Take 1 tablet by mouth.      [DISCONTINUED] clopidogrel (PLAVIX) 75 mg tablet Take 1 tablet (75 mg total) by mouth once daily. 30 tablet 11    [DISCONTINUED] metformin (GLUCOPHAGE) 500 MG tablet       [DISCONTINUED] SIMBRINZA 1-0.2 % DrpS        No current facility-administered medications on file prior to visit.        Review of patient's allergies indicates:  No Known  "Allergies  Objective:     Vitals:    07/22/19 1558   BP: 128/75   BP Location: Right arm   Patient Position: Sitting   BP Method: Large (Automatic)   Pulse: 87   Weight: 84.2 kg (185 lb 10 oz)   Height: 6' 3" (1.905 m)        Physical Exam   Constitutional: He is oriented to person, place, and time. He appears well-developed and well-nourished. No distress.   Eyes: No scleral icterus.   Neck: No JVD present. Carotid bruit is not present.   Cardiovascular: Regular rhythm and normal heart sounds. Exam reveals no gallop and no friction rub.   No murmur heard.  Pulses:       Posterior tibial pulses are 2+ on the right side, and 2+ on the left side.   Pulmonary/Chest: Effort normal and breath sounds normal. No respiratory distress.   Abdominal: Soft. He exhibits no abdominal bruit, no pulsatile midline mass and no mass. There is no hepatosplenomegaly. There is no tenderness.   Musculoskeletal: He exhibits no edema.   Neurological: He is alert and oriented to person, place, and time.   Skin: Skin is warm and dry. He is not diaphoretic.   Psychiatric: He has a normal mood and affect. His behavior is normal. Judgment and thought content normal.   Vitals reviewed.     ECG today: NSR, WNL    Old records from [facility] reviewed:    MRI of brain 2/19:  Right frontal ischemic stroke, small vessel changes, and volume loss    Carotid ultrasound 2/19:  Bilateral non obstructive soft plaque    CTA of head and neck showed intracranial atherosclerosis but no critical stenosis of carotids.    Echocardiogram 2/19 showed normal LVEF    30 day event monitor in February showed no significant dysrhythmia.  Assessment:     1. Essential hypertension    2. Hypertriglyceridemia    3. Mixed hyperlipidemia    4. History of stroke without residual deficits    5. Carotid stenosis, asymptomatic, left    6. Palpitations    7.      DM  8.      glaucoma  Plan:   Klever was seen today for chest pain and transient ischemic attack.    Diagnoses and all " orders for this visit:    Essential hypertension  -     IN OFFICE EKG 12-LEAD (to Muse)    Hypertriglyceridemia    Mixed hyperlipidemia    History of stroke without residual deficits  -     IN OFFICE EKG 12-LEAD (to Muse)    Carotid stenosis, asymptomatic, left    Palpitations     Cardiac status is stable.    The palpitations may have been isolated PAC's or PVC's.  Doubt sinus tachycardia due to orthostatic hypotension.  Note that a 30 day event monitor earlier this year did not show any significant arryhthmia.    OK from cardiac standpoint to take Simbrinza eye drops.  Pt cautioned about the unlikely side effect of possible orthostatic hypotension.    Follow up in about 6 months (around 1/22/2020).

## 2019-10-03 ENCOUNTER — OFFICE VISIT (OUTPATIENT)
Dept: PODIATRY | Facility: CLINIC | Age: 74
End: 2019-10-03
Payer: MEDICARE

## 2019-10-03 VITALS
BODY MASS INDEX: 23.58 KG/M2 | WEIGHT: 189.63 LBS | RESPIRATION RATE: 18 BRPM | HEIGHT: 75 IN | SYSTOLIC BLOOD PRESSURE: 130 MMHG | HEART RATE: 82 BPM | DIASTOLIC BLOOD PRESSURE: 70 MMHG

## 2019-10-03 DIAGNOSIS — E11.49 TYPE II DIABETES MELLITUS WITH NEUROLOGICAL MANIFESTATIONS: Primary | ICD-10-CM

## 2019-10-03 DIAGNOSIS — B35.1 ONYCHOMYCOSIS DUE TO DERMATOPHYTE: ICD-10-CM

## 2019-10-03 DIAGNOSIS — L84 CORN OR CALLUS: ICD-10-CM

## 2019-10-03 PROCEDURE — 11721 DEBRIDE NAIL 6 OR MORE: CPT | Mod: Q9,59,S$PBB, | Performed by: PODIATRIST

## 2019-10-03 PROCEDURE — 11721 PR DEBRIDEMENT OF NAILS, 6 OR MORE: ICD-10-PCS | Mod: Q9,59,S$PBB, | Performed by: PODIATRIST

## 2019-10-03 PROCEDURE — 11056 PARNG/CUTG B9 HYPRKR LES 2-4: CPT | Mod: Q9,S$PBB,, | Performed by: PODIATRIST

## 2019-10-03 PROCEDURE — 99213 OFFICE O/P EST LOW 20 MIN: CPT | Mod: PBBFAC,25 | Performed by: PODIATRIST

## 2019-10-03 PROCEDURE — 11721 DEBRIDE NAIL 6 OR MORE: CPT | Mod: Q9,59,PBBFAC | Performed by: PODIATRIST

## 2019-10-03 PROCEDURE — 99999 PR PBB SHADOW E&M-EST. PATIENT-LVL III: CPT | Mod: PBBFAC,,, | Performed by: PODIATRIST

## 2019-10-03 PROCEDURE — 11056 PARNG/CUTG B9 HYPRKR LES 2-4: CPT | Mod: Q9,PBBFAC | Performed by: PODIATRIST

## 2019-10-03 PROCEDURE — 99499 NO LOS: ICD-10-PCS | Mod: S$PBB,,, | Performed by: PODIATRIST

## 2019-10-03 PROCEDURE — 99499 UNLISTED E&M SERVICE: CPT | Mod: S$PBB,,, | Performed by: PODIATRIST

## 2019-10-03 PROCEDURE — 99999 PR PBB SHADOW E&M-EST. PATIENT-LVL III: ICD-10-PCS | Mod: PBBFAC,,, | Performed by: PODIATRIST

## 2019-10-03 PROCEDURE — 11056 PR TRIM BENIGN HYPERKERATOTIC SKIN LESION,2-4: ICD-10-PCS | Mod: Q9,S$PBB,, | Performed by: PODIATRIST

## 2019-10-03 NOTE — PROGRESS NOTES
Subjective:      Patient ID: Klever Link is a 74 y.o. male.    Chief Complaint: PCP (PETRONA SHI 10/17/19); Diabetic Foot Exam; and Nail Care    Klever is a 74 y.o. male who presents to the clinic for evaluation and treatment of high risk feet. Klever has a past medical history of Diabetes mellitus, Expressive aphasia (2/12/2015), Hyperlipidemia, Hypertension, Stroke, and TIA (transient ischemic attack) (02/2019). The patient's chief complaint is long, thick toenails. This patient has documented high risk feet requiring routine maintenance secondary to peripheral vascular disease.    PCP: Petrona Shi MD    Date Last Seen by PCP:   Chief Complaint   Patient presents with    PCP     PETRONA SHI 10/17/19    Diabetic Foot Exam    Nail Care       Current shoe gear:  Casual shoes      Hemoglobin A1C   Date Value Ref Range Status   02/20/2019 7.6 (H) 4.0 - 5.6 % Final     Comment:     ADA Screening Guidelines:  5.7-6.4%  Consistent with prediabetes  >or=6.5%  Consistent with diabetes  High levels of fetal hemoglobin interfere with the HbA1C  assay. Heterozygous hemoglobin variants (HbS, HgC, etc)do  not significantly interfere with this assay.   However, presence of multiple variants may affect accuracy.     02/12/2015 13.5 (H) 4.5 - 6.2 % Final       Review of Systems   Constitution: Negative for chills, decreased appetite and fever.   Cardiovascular: Negative for leg swelling.   Skin: Positive for dry skin, itching and nail changes. Negative for color change, flushing, poor wound healing, rash, skin cancer and suspicious lesions.   Musculoskeletal: Negative for arthritis, back pain, joint pain, joint swelling and myalgias.   Gastrointestinal: Negative for nausea and vomiting.   Neurological: Positive for numbness. Negative for loss of balance and paresthesias.           Objective:       Vitals:    10/03/19 0934   BP: 130/70   Pulse: 82   Resp: 18   Weight: 86 kg (189 lb 9.5 oz)  "  Height: 6' 3" (1.905 m)   PainSc: 0-No pain        Physical Exam   Constitutional: He is oriented to person, place, and time. He appears well-developed and well-nourished.   Cardiovascular:   Dorsalis pedis and posterior tibial pulses are diminished Bilaterally. Toes are cool to touch. Feet are warm proximally.There is decreased digital hair. Skin is atrophic, slightly hyperpigmented, and mildly edematous       Musculoskeletal: Normal range of motion. He exhibits no edema or tenderness.   Adequate joint range of motion without pain, limitation, nor crepitation Bilateral feet and ankle joints. Muscle strength is 5/5 in all groups bilaterally.         Neurological: He is alert and oriented to person, place, and time.   [place]-Alex 5.07 monofilamant testing is diminished Dheeraj feet. Sharp/dull sensation diminished Bilaterally. Light touch absent Bilaterally.       Skin: Skin is warm, dry and intact. No abrasion, no bruising, no burn, no ecchymosis and no lesion noted. He is not diaphoretic. No erythema. No pallor.   Nails x 10  are elongated by  3-7mm's, thickened by 2-4 mm's, dystrophic, and are darkened in  coloration . Xerosis Bilaterally. No open lesions noted.    Hyperkeratotic tissue noted to distal hallux b.l     Psychiatric: He has a normal mood and affect. His behavior is normal.   Nursing note and vitals reviewed.            Assessment:       Encounter Diagnoses   Name Primary?    Type II diabetes mellitus with neurological manifestations Yes    Onychomycosis due to dermatophyte     Corn or callus          Plan:       Klever was seen today for pcp, diabetic foot exam and nail care.    Diagnoses and all orders for this visit:    Type II diabetes mellitus with neurological manifestations    Onychomycosis due to dermatophyte    Corn or callus      I counseled the patient on his conditions, their implications and medical management.        - Shoe inspection. Diabetic Foot Education. Patient reminded of the " importance of good nutrition and blood sugar control to help prevent podiatric complications of diabetes. Patient instructed on proper foot hygeine. We discussed wearing proper shoe gear, daily foot inspections, never walking without protective shoe gear, never putting sharp instruments to feet, routine podiatric nail visits every 2-3 months.      - With patient's permission, nails were aggressively reduced and debrided x 10 to their soft tissue attachment mechanically and with electric , removing all offending nail and debris. Patient relates relief following the procedure. He will continue to monitor the areas daily, inspect his feet, wear protective shoe gear when ambulatory, moisturizer to maintain skin integrity and follow in this office in approximately 2-3 months, sooner p.r.n.      - After cleansing the  area w/ alcohol prep pad the above mentioned hyperkeratosis was trimmed utilizing No 15 scapel, to a smooth base with out incident. Patient tolerated this  well and reported comfort to the area of distal hallux b/l

## 2019-12-17 ENCOUNTER — OFFICE VISIT (OUTPATIENT)
Dept: CARDIOLOGY | Facility: CLINIC | Age: 74
End: 2019-12-17
Payer: MEDICARE

## 2019-12-17 VITALS
HEIGHT: 75 IN | BODY MASS INDEX: 23.63 KG/M2 | OXYGEN SATURATION: 95 % | DIASTOLIC BLOOD PRESSURE: 81 MMHG | SYSTOLIC BLOOD PRESSURE: 123 MMHG | WEIGHT: 190.06 LBS | HEART RATE: 90 BPM

## 2019-12-17 DIAGNOSIS — I10 ESSENTIAL HYPERTENSION: ICD-10-CM

## 2019-12-17 DIAGNOSIS — I65.22 CAROTID STENOSIS, ASYMPTOMATIC, LEFT: ICD-10-CM

## 2019-12-17 DIAGNOSIS — R00.2 PALPITATIONS: ICD-10-CM

## 2019-12-17 DIAGNOSIS — E78.2 MIXED HYPERLIPIDEMIA: ICD-10-CM

## 2019-12-17 DIAGNOSIS — E11.00 TYPE 2 DIABETES MELLITUS WITH HYPEROSMOLARITY WITHOUT COMA, WITHOUT LONG-TERM CURRENT USE OF INSULIN: ICD-10-CM

## 2019-12-17 DIAGNOSIS — E78.1 HYPERTRIGLYCERIDEMIA: ICD-10-CM

## 2019-12-17 DIAGNOSIS — Z86.73 HISTORY OF STROKE WITHOUT RESIDUAL DEFICITS: Primary | ICD-10-CM

## 2019-12-17 PROCEDURE — 99999 PR PBB SHADOW E&M-EST. PATIENT-LVL III: ICD-10-PCS | Mod: PBBFAC,,, | Performed by: INTERNAL MEDICINE

## 2019-12-17 PROCEDURE — 1159F PR MEDICATION LIST DOCUMENTED IN MEDICAL RECORD: ICD-10-PCS | Mod: ,,, | Performed by: INTERNAL MEDICINE

## 2019-12-17 PROCEDURE — 99999 PR PBB SHADOW E&M-EST. PATIENT-LVL III: CPT | Mod: PBBFAC,,, | Performed by: INTERNAL MEDICINE

## 2019-12-17 PROCEDURE — 93010 EKG 12-LEAD: ICD-10-PCS | Mod: S$PBB,,, | Performed by: INTERNAL MEDICINE

## 2019-12-17 PROCEDURE — 99213 OFFICE O/P EST LOW 20 MIN: CPT | Mod: PBBFAC,PO | Performed by: INTERNAL MEDICINE

## 2019-12-17 PROCEDURE — 93010 ELECTROCARDIOGRAM REPORT: CPT | Mod: S$PBB,,, | Performed by: INTERNAL MEDICINE

## 2019-12-17 PROCEDURE — 1159F MED LIST DOCD IN RCRD: CPT | Mod: ,,, | Performed by: INTERNAL MEDICINE

## 2019-12-17 PROCEDURE — 99213 OFFICE O/P EST LOW 20 MIN: CPT | Mod: S$PBB,25,, | Performed by: INTERNAL MEDICINE

## 2019-12-17 PROCEDURE — 93005 ELECTROCARDIOGRAM TRACING: CPT | Mod: PBBFAC,PO | Performed by: INTERNAL MEDICINE

## 2019-12-17 PROCEDURE — 99213 PR OFFICE/OUTPT VISIT, EST, LEVL III, 20-29 MIN: ICD-10-PCS | Mod: S$PBB,25,, | Performed by: INTERNAL MEDICINE

## 2019-12-17 NOTE — PROGRESS NOTES
"  Subjective:      Patient ID: Klever Link is a 74 y.o. male.    Chief Complaint: Follow-up (Hypertension)    HPI:  Up to date with colonoscopy with Dr Brian Marques. Hx of polyps.    Walks.    Does mild calesthenics    "I feel pretty good."    No recurrent visual issues.  Eye pressure is down from 21 to 11--pt is followed by Dr Limon    Review of Systems   Cardiovascular: Negative for chest pain, claudication, dyspnea on exertion, irregular heartbeat, leg swelling, near-syncope, orthopnea, palpitations and syncope.      Last hgba1c 8 per pt.  Discussed ADA diet    Past Medical History:   Diagnosis Date    Diabetes mellitus     Expressive aphasia 2015    Hyperlipidemia     Hypertension     Stroke     TIA (transient ischemic attack) 2019        Past Surgical History:   Procedure Laterality Date    KNEE SURGERY Bilateral        Family History   Problem Relation Age of Onset    Diabetes Mother     Ovarian cancer Mother     Hypertension Father     Stroke Father     Diabetes Sister     Diabetes Sister     Diabetes Sister     Stroke Sister        Social History     Socioeconomic History    Marital status:      Spouse name: Not on file    Number of children: Not on file    Years of education: Not on file    Highest education level: Not on file   Occupational History    Not on file   Social Needs    Financial resource strain: Not on file    Food insecurity:     Worry: Not on file     Inability: Not on file    Transportation needs:     Medical: Not on file     Non-medical: Not on file   Tobacco Use    Smoking status: Former Smoker     Packs/day: 1.00     Years: 0.00     Pack years: 0.00     Start date: 1965     Last attempt to quit: 2008     Years since quittin.4    Smokeless tobacco: Never Used   Substance and Sexual Activity    Alcohol use: Not Currently    Drug use: No    Sexual activity: Yes     Partners: Female   Lifestyle    Physical activity:     Days " "per week: Not on file     Minutes per session: Not on file    Stress: Not on file   Relationships    Social connections:     Talks on phone: Not on file     Gets together: Not on file     Attends Voodoo service: Not on file     Active member of club or organization: Not on file     Attends meetings of clubs or organizations: Not on file     Relationship status: Not on file   Other Topics Concern    Not on file   Social History Narrative    Not on file       Current Outpatient Medications on File Prior to Visit   Medication Sig Dispense Refill    ASPIR-LOW 81 mg EC tablet       atorvastatin (LIPITOR) 40 MG tablet Take 1 tablet by mouth.      AZOPT 1 % ophthalmic suspension       blood sugar diagnostic Strp 1 strip by Misc.(Non-Drug; Combo Route) route 3 (three) times daily. 100 strip 1    hydrochlorothiazide (HYDRODIURIL) 25 MG tablet Take 25 mg by mouth once daily.      lancets (ONE TOUCH DELICA LANCETS) 33 gauge Misc 1 lancet by Misc.(Non-Drug; Combo Route) route 3 (three) times daily before meals. 100 each 1    lisinopril (PRINIVIL,ZESTRIL) 20 MG tablet Take 20 mg by mouth once daily.      LUMIGAN 0.01 % Drop       metFORMIN (GLUCOPHAGE) 500 MG tablet Take 1 tablet by mouth.      [DISCONTINUED] ketoconazole (NIZORAL) 2 % cream Apply topically once daily. 60 g 3     No current facility-administered medications on file prior to visit.        Review of patient's allergies indicates:  No Known Allergies  Objective:     Vitals:    12/17/19 1505   BP: 123/81   BP Location: Left arm   Patient Position: Sitting   BP Method: Large (Automatic)   Pulse: 90   SpO2: 95%   Weight: 86.2 kg (190 lb 0.6 oz)   Height: 6' 3" (1.905 m)        Physical Exam   Constitutional: He is oriented to person, place, and time. He appears well-developed and well-nourished. No distress.   Eyes: No scleral icterus.   Neck: No JVD present. Carotid bruit is not present.   Cardiovascular: Regular rhythm and normal heart sounds.  " Occasional extrasystoles are present. Exam reveals no gallop and no friction rub.   No murmur heard.  Pulmonary/Chest: Effort normal and breath sounds normal. No respiratory distress.   Musculoskeletal: He exhibits no edema.   Neurological: He is alert and oriented to person, place, and time.   Skin: Skin is warm and dry. He is not diaphoretic.   Psychiatric: He has a normal mood and affect. His behavior is normal. Judgment and thought content normal.   Vitals reviewed.     ECG: NSR, WNL  Assessment:     1. History of stroke without residual deficits    2. Palpitations    3. Mixed hyperlipidemia    4. Hypertriglyceridemia    5. Essential hypertension    6. Carotid stenosis, asymptomatic, left    7. Type 2 diabetes mellitus with hyperosmolarity without coma, without long-term current use of insulin      Plan:   Klever was seen today for follow-up.    Diagnoses and all orders for this visit:    History of stroke without residual deficits  -     IN OFFICE EKG 12-LEAD (to Muse)    Palpitations  -     IN OFFICE EKG 12-LEAD (to Muse)    Mixed hyperlipidemia  -     IN OFFICE EKG 12-LEAD (to Muse)    Hypertriglyceridemia  -     IN OFFICE EKG 12-LEAD (to Muse)    Essential hypertension  -     IN OFFICE EKG 12-LEAD (to Muse)    Carotid stenosis, asymptomatic, left    Type 2 diabetes mellitus with hyperosmolarity without coma, without long-term current use of insulin     Same meds    RTC 6 months    Follow up in about 6 months (around 6/17/2020).

## 2020-01-27 ENCOUNTER — OFFICE VISIT (OUTPATIENT)
Dept: PODIATRY | Facility: CLINIC | Age: 75
End: 2020-01-27
Payer: MEDICARE

## 2020-01-27 VITALS
HEART RATE: 88 BPM | SYSTOLIC BLOOD PRESSURE: 171 MMHG | HEIGHT: 76 IN | DIASTOLIC BLOOD PRESSURE: 8 MMHG | WEIGHT: 190 LBS | BODY MASS INDEX: 23.14 KG/M2 | RESPIRATION RATE: 18 BRPM

## 2020-01-27 DIAGNOSIS — L84 CORN OR CALLUS: ICD-10-CM

## 2020-01-27 DIAGNOSIS — B35.1 ONYCHOMYCOSIS DUE TO DERMATOPHYTE: ICD-10-CM

## 2020-01-27 DIAGNOSIS — E11.49 TYPE II DIABETES MELLITUS WITH NEUROLOGICAL MANIFESTATIONS: Primary | ICD-10-CM

## 2020-01-27 PROCEDURE — 11721 DEBRIDE NAIL 6 OR MORE: CPT | Mod: Q9,59,PBBFAC | Performed by: PODIATRIST

## 2020-01-27 PROCEDURE — 11056 PARNG/CUTG B9 HYPRKR LES 2-4: CPT | Mod: Q9,S$PBB,, | Performed by: PODIATRIST

## 2020-01-27 PROCEDURE — 99999 PR PBB SHADOW E&M-EST. PATIENT-LVL III: CPT | Mod: PBBFAC,,, | Performed by: PODIATRIST

## 2020-01-27 PROCEDURE — 11721 PR DEBRIDEMENT OF NAILS, 6 OR MORE: ICD-10-PCS | Mod: Q9,59,S$PBB, | Performed by: PODIATRIST

## 2020-01-27 PROCEDURE — 11056 PR TRIM BENIGN HYPERKERATOTIC SKIN LESION,2-4: ICD-10-PCS | Mod: Q9,S$PBB,, | Performed by: PODIATRIST

## 2020-01-27 PROCEDURE — 99999 PR PBB SHADOW E&M-EST. PATIENT-LVL III: ICD-10-PCS | Mod: PBBFAC,,, | Performed by: PODIATRIST

## 2020-01-27 PROCEDURE — 99499 NO LOS: ICD-10-PCS | Mod: S$PBB,,, | Performed by: PODIATRIST

## 2020-01-27 PROCEDURE — 99213 OFFICE O/P EST LOW 20 MIN: CPT | Mod: PBBFAC | Performed by: PODIATRIST

## 2020-01-27 PROCEDURE — 99499 UNLISTED E&M SERVICE: CPT | Mod: S$PBB,,, | Performed by: PODIATRIST

## 2020-01-27 PROCEDURE — 11721 DEBRIDE NAIL 6 OR MORE: CPT | Mod: Q9,59,S$PBB, | Performed by: PODIATRIST

## 2020-01-27 PROCEDURE — 11056 PARNG/CUTG B9 HYPRKR LES 2-4: CPT | Mod: Q9,PBBFAC | Performed by: PODIATRIST

## 2020-01-27 RX ORDER — LANCETS 28 GAUGE
EACH MISCELLANEOUS
COMMUNITY
Start: 2020-01-07

## 2020-01-27 NOTE — PROGRESS NOTES
Subjective:      Patient ID: Klever Link is a 74 y.o. male.    Chief Complaint: PCP (PETRONA SHI. 12/12/19); Diabetic Foot Exam; Nail Problem; and Foot Problem (dry skin )    Klever is a 74 y.o. male who presents to the clinic for evaluation and treatment of high risk feet. Klever has a past medical history of Diabetes mellitus, Expressive aphasia (2/12/2015), Hyperlipidemia, Hypertension, Stroke, and TIA (transient ischemic attack) (02/2019). The patient's chief complaint is long, thick toenails. This patient has documented high risk feet requiring routine maintenance secondary to peripheral vascular disease.    PCP: Petrona Shi MD    Date Last Seen by PCP:   Chief Complaint   Patient presents with    PCP     PETRONA SHI. 12/12/19    Diabetic Foot Exam    Nail Problem    Foot Problem     dry skin        Current shoe gear:  Casual shoes      Hemoglobin A1C   Date Value Ref Range Status   02/20/2019 7.6 (H) 4.0 - 5.6 % Final     Comment:     ADA Screening Guidelines:  5.7-6.4%  Consistent with prediabetes  >or=6.5%  Consistent with diabetes  High levels of fetal hemoglobin interfere with the HbA1C  assay. Heterozygous hemoglobin variants (HbS, HgC, etc)do  not significantly interfere with this assay.   However, presence of multiple variants may affect accuracy.     02/12/2015 13.5 (H) 4.5 - 6.2 % Final       Review of Systems   Constitution: Negative for chills, decreased appetite and fever.   Cardiovascular: Negative for leg swelling.   Skin: Positive for dry skin, itching and nail changes. Negative for color change, flushing, poor wound healing, rash, skin cancer and suspicious lesions.   Musculoskeletal: Negative for arthritis, back pain, joint pain, joint swelling and myalgias.   Gastrointestinal: Negative for nausea and vomiting.   Neurological: Positive for numbness. Negative for loss of balance and paresthesias.           Objective:       Vitals:    01/27/20 0859   BP: (!) 171/8  "  Pulse: 88   Resp: 18   Weight: 86.2 kg (190 lb)   Height: 6' 4" (1.93 m)   PainSc: 0-No pain        Physical Exam   Constitutional: He is oriented to person, place, and time. He appears well-developed and well-nourished.   Cardiovascular:   Dorsalis pedis and posterior tibial pulses are diminished Bilaterally. Toes are cool to touch. Feet are warm proximally.There is decreased digital hair. Skin is atrophic, slightly hyperpigmented, and mildly edematous       Musculoskeletal: Normal range of motion. He exhibits no edema or tenderness.   Adequate joint range of motion without pain, limitation, nor crepitation Bilateral feet and ankle joints. Muscle strength is 5/5 in all groups bilaterally.         Neurological: He is alert and oriented to person, place, and time.   [place]-Alex 5.07 monofilamant testing is diminished Dheeraj feet. Sharp/dull sensation diminished Bilaterally. Light touch absent Bilaterally.       Skin: Skin is warm, dry and intact. No abrasion, no bruising, no burn, no ecchymosis and no lesion noted. He is not diaphoretic. No erythema. No pallor.   Nails x 10  are elongated by  3-5mm's, thickened by 2-4 mm's, dystrophic, and are darkened in  coloration . Xerosis Bilaterally. No open lesions noted.    Hyperkeratotic tissue noted to distal hallux b.l     Psychiatric: He has a normal mood and affect. His behavior is normal.   Nursing note and vitals reviewed.            Assessment:       Encounter Diagnoses   Name Primary?    Type II diabetes mellitus with neurological manifestations Yes    Onychomycosis due to dermatophyte     Corn or callus          Plan:       Klever was seen today for pcp, diabetic foot exam, nail problem and foot problem.    Diagnoses and all orders for this visit:    Type II diabetes mellitus with neurological manifestations    Onychomycosis due to dermatophyte    Corn or callus      I counseled the patient on his conditions, their implications and medical management.        - " Shoe inspection. Diabetic Foot Education. Patient reminded of the importance of good nutrition and blood sugar control to help prevent podiatric complications of diabetes. Patient instructed on proper foot hygeine. We discussed wearing proper shoe gear, daily foot inspections, never walking without protective shoe gear, never putting sharp instruments to feet, routine podiatric nail visits every 2-3 months.      - With patient's permission, nails were aggressively reduced and debrided x 10 to their soft tissue attachment mechanically and with electric , removing all offending nail and debris. Patient relates relief following the procedure. He will continue to monitor the areas daily, inspect his feet, wear protective shoe gear when ambulatory, moisturizer to maintain skin integrity and follow in this office in approximately 2-3 months, sooner p.r.n.      - After cleansing the  area w/ alcohol prep pad the above mentioned hyperkeratosis was trimmed utilizing No 15 scapel, to a smooth base with out incident. Patient tolerated this  well and reported comfort to the area of distal hallux b/l

## 2020-06-23 ENCOUNTER — TELEPHONE (OUTPATIENT)
Dept: PODIATRY | Facility: CLINIC | Age: 75
End: 2020-06-23

## 2020-06-23 NOTE — TELEPHONE ENCOUNTER
----- Message from Raymon Lozada sent at 6/23/2020  1:35 PM CDT -----  Regarding: Appt  Contact: self  Mg  Pt previous appt where cancelled and pt needs to be seen before Aug 18 due to pt being an diego        Contact

## 2020-06-23 NOTE — TELEPHONE ENCOUNTER
----- Message from Jim Gomez sent at 6/23/2020  2:17 PM CDT -----  Contact: pt @ [phone]  Pt returning Marielle's call

## 2020-07-22 ENCOUNTER — OFFICE VISIT (OUTPATIENT)
Dept: PODIATRY | Facility: CLINIC | Age: 75
End: 2020-07-22
Payer: MEDICARE

## 2020-07-22 VITALS
SYSTOLIC BLOOD PRESSURE: 121 MMHG | BODY MASS INDEX: 23.14 KG/M2 | WEIGHT: 190.06 LBS | DIASTOLIC BLOOD PRESSURE: 73 MMHG | HEART RATE: 92 BPM | HEIGHT: 76 IN

## 2020-07-22 DIAGNOSIS — B35.1 ONYCHOMYCOSIS DUE TO DERMATOPHYTE: ICD-10-CM

## 2020-07-22 DIAGNOSIS — E11.49 TYPE II DIABETES MELLITUS WITH NEUROLOGICAL MANIFESTATIONS: Primary | ICD-10-CM

## 2020-07-22 PROCEDURE — 99213 OFFICE O/P EST LOW 20 MIN: CPT | Mod: PBBFAC,25 | Performed by: PODIATRIST

## 2020-07-22 PROCEDURE — 11721 DEBRIDE NAIL 6 OR MORE: CPT | Mod: Q9,S$PBB,, | Performed by: PODIATRIST

## 2020-07-22 PROCEDURE — 99999 PR PBB SHADOW E&M-EST. PATIENT-LVL III: ICD-10-PCS | Mod: PBBFAC,,, | Performed by: PODIATRIST

## 2020-07-22 PROCEDURE — 11721 PR DEBRIDEMENT OF NAILS, 6 OR MORE: ICD-10-PCS | Mod: Q9,S$PBB,, | Performed by: PODIATRIST

## 2020-07-22 PROCEDURE — 99999 PR PBB SHADOW E&M-EST. PATIENT-LVL III: CPT | Mod: PBBFAC,,, | Performed by: PODIATRIST

## 2020-07-22 PROCEDURE — 99499 NO LOS: ICD-10-PCS | Mod: S$PBB,,, | Performed by: PODIATRIST

## 2020-07-22 PROCEDURE — 11721 DEBRIDE NAIL 6 OR MORE: CPT | Mod: Q9,PBBFAC | Performed by: PODIATRIST

## 2020-07-22 PROCEDURE — 99499 UNLISTED E&M SERVICE: CPT | Mod: S$PBB,,, | Performed by: PODIATRIST

## 2020-07-22 NOTE — PROGRESS NOTES
"  Subjective:      Patient ID: Klever Link is a 75 y.o. male.    Chief Complaint: Diabetic Foot Exam and Diabetes Mellitus (pt seen dr welch on 07/13/20)    Klever is a 75 y.o. male who presents to the clinic for evaluation and treatment of high risk feet. Klever has a past medical history of Diabetes mellitus, Expressive aphasia (2/12/2015), Hyperlipidemia, Hypertension, Stroke, and TIA (transient ischemic attack) (02/2019). The patient's chief complaint is long, thick toenails. This patient has documented high risk feet requiring routine maintenance secondary to peripheral vascular disease.    PCP: Petrona Welch MD    Date Last Seen by PCP:   Chief Complaint   Patient presents with    Diabetic Foot Exam    Diabetes Mellitus     pt seen dr welch on 07/13/20       Current shoe gear:  Casual shoes      Hemoglobin A1C   Date Value Ref Range Status   02/20/2019 7.6 (H) 4.0 - 5.6 % Final     Comment:     ADA Screening Guidelines:  5.7-6.4%  Consistent with prediabetes  >or=6.5%  Consistent with diabetes  High levels of fetal hemoglobin interfere with the HbA1C  assay. Heterozygous hemoglobin variants (HbS, HgC, etc)do  not significantly interfere with this assay.   However, presence of multiple variants may affect accuracy.     02/12/2015 13.5 (H) 4.5 - 6.2 % Final       Review of Systems   Constitution: Negative for chills, decreased appetite and fever.   Cardiovascular: Negative for leg swelling.   Skin: Positive for dry skin, itching and nail changes. Negative for color change, flushing, poor wound healing, rash, skin cancer and suspicious lesions.   Musculoskeletal: Negative for arthritis, back pain, joint pain, joint swelling and myalgias.   Gastrointestinal: Negative for nausea and vomiting.   Neurological: Positive for numbness. Negative for loss of balance and paresthesias.           Objective:       Vitals:    07/22/20 0917   BP: 121/73   Pulse: 92   Weight: 86.2 kg (190 lb 0.6 oz)   Height: 6' 4" " (1.93 m)   PainSc: 0-No pain        Physical Exam  Vitals signs and nursing note reviewed.   Constitutional:       Appearance: He is well-developed. He is not diaphoretic.   Cardiovascular:      Comments: Dorsalis pedis and posterior tibial pulses are diminished Bilaterally. Toes are cool to touch. Feet are warm proximally.There is decreased digital hair. Skin is atrophic, slightly hyperpigmented, and mildly edematous      Musculoskeletal: Normal range of motion.         General: No tenderness.      Comments: Adequate joint range of motion without pain, limitation, nor crepitation Bilateral feet and ankle joints. Muscle strength is 5/5 in all groups bilaterally.         Skin:     General: Skin is warm and dry.      Coloration: Skin is not pale.      Findings: No abrasion, bruising, burn, ecchymosis, erythema or lesion.      Comments: Nails x 10  are elongated by  3-7mm's, thickened by 2-4 mm's, dystrophic, and are darkened in  coloration . Xerosis Bilaterally. No open lesions noted.     Neurological:      Mental Status: He is alert and oriented to person, place, and time.      Comments: [place]-Alex 5.07 monofilamant testing is diminished Dheeraj feet. Sharp/dull sensation diminished Bilaterally. Light touch absent Bilaterally.       Psychiatric:         Behavior: Behavior normal.               Assessment:       Encounter Diagnoses   Name Primary?    Type II diabetes mellitus with neurological manifestations Yes    Onychomycosis due to dermatophyte          Plan:       Klever was seen today for diabetic foot exam and diabetes mellitus.    Diagnoses and all orders for this visit:    Type II diabetes mellitus with neurological manifestations    Onychomycosis due to dermatophyte      I counseled the patient on his conditions, their implications and medical management.        - Shoe inspection. Diabetic Foot Education. Patient reminded of the importance of good nutrition and blood sugar control to help prevent  podiatric complications of diabetes. Patient instructed on proper foot hygeine. We discussed wearing proper shoe gear, daily foot inspections, never walking without protective shoe gear, never putting sharp instruments to feet, routine podiatric nail visits every 2-3 months.      - With patient's permission, nails were aggressively reduced and debrided x 10 to their soft tissue attachment mechanically and with electric , removing all offending nail and debris. Patient relates relief following the procedure. He will continue to monitor the areas daily, inspect his feet, wear protective shoe gear when ambulatory, moisturizer to maintain skin integrity and follow in this office in approximately 2-3 months, sooner p.r.n.

## 2020-10-01 ENCOUNTER — OFFICE VISIT (OUTPATIENT)
Dept: CARDIOLOGY | Facility: CLINIC | Age: 75
End: 2020-10-01
Payer: MEDICARE

## 2020-10-01 VITALS
HEART RATE: 78 BPM | WEIGHT: 186.5 LBS | DIASTOLIC BLOOD PRESSURE: 84 MMHG | HEIGHT: 76 IN | SYSTOLIC BLOOD PRESSURE: 153 MMHG | BODY MASS INDEX: 22.71 KG/M2

## 2020-10-01 DIAGNOSIS — E78.1 HYPERTRIGLYCERIDEMIA: ICD-10-CM

## 2020-10-01 DIAGNOSIS — Z86.73 OLD LACUNAR STROKE WITHOUT LATE EFFECT: ICD-10-CM

## 2020-10-01 DIAGNOSIS — Z86.73 HISTORY OF STROKE WITHOUT RESIDUAL DEFICITS: Primary | ICD-10-CM

## 2020-10-01 DIAGNOSIS — E78.2 MIXED HYPERLIPIDEMIA: ICD-10-CM

## 2020-10-01 DIAGNOSIS — I10 ESSENTIAL HYPERTENSION: ICD-10-CM

## 2020-10-01 PROCEDURE — 99213 PR OFFICE/OUTPT VISIT, EST, LEVL III, 20-29 MIN: ICD-10-PCS | Mod: 25,S$PBB,, | Performed by: INTERNAL MEDICINE

## 2020-10-01 PROCEDURE — 99999 PR PBB SHADOW E&M-EST. PATIENT-LVL III: CPT | Mod: PBBFAC,,, | Performed by: INTERNAL MEDICINE

## 2020-10-01 PROCEDURE — 93005 ELECTROCARDIOGRAM TRACING: CPT | Mod: PBBFAC,PO | Performed by: INTERNAL MEDICINE

## 2020-10-01 PROCEDURE — 93010 EKG 12-LEAD: ICD-10-PCS | Mod: S$PBB,,, | Performed by: INTERNAL MEDICINE

## 2020-10-01 PROCEDURE — 99999 PR PBB SHADOW E&M-EST. PATIENT-LVL III: ICD-10-PCS | Mod: PBBFAC,,, | Performed by: INTERNAL MEDICINE

## 2020-10-01 PROCEDURE — 99213 OFFICE O/P EST LOW 20 MIN: CPT | Mod: 25,S$PBB,, | Performed by: INTERNAL MEDICINE

## 2020-10-01 PROCEDURE — 99213 OFFICE O/P EST LOW 20 MIN: CPT | Mod: PBBFAC,PO,25 | Performed by: INTERNAL MEDICINE

## 2020-10-01 PROCEDURE — 93010 ELECTROCARDIOGRAM REPORT: CPT | Mod: S$PBB,,, | Performed by: INTERNAL MEDICINE

## 2020-10-01 NOTE — PROGRESS NOTES
Subjective:      Patient ID: Klever Link is a 75 y.o. male.    Chief Complaint: Follow-up    HPI:  Walks a little   Does a little calisthenics      Review of Systems   Cardiovascular: Negative for chest pain, claudication, dyspnea on exertion, irregular heartbeat, leg swelling, near-syncope, orthopnea, palpitations and syncope.        Past Medical History:   Diagnosis Date    Diabetes mellitus     Expressive aphasia 2015    Hyperlipidemia     Hypertension     Stroke     TIA (transient ischemic attack) 2019        Past Surgical History:   Procedure Laterality Date    KNEE SURGERY Bilateral        Family History   Problem Relation Age of Onset    Diabetes Mother     Ovarian cancer Mother     Hypertension Father     Stroke Father     Diabetes Sister     Diabetes Sister     Diabetes Sister     Stroke Sister        Social History     Socioeconomic History    Marital status:      Spouse name: Not on file    Number of children: Not on file    Years of education: Not on file    Highest education level: Not on file   Occupational History    Not on file   Social Needs    Financial resource strain: Not on file    Food insecurity     Worry: Not on file     Inability: Not on file    Transportation needs     Medical: Not on file     Non-medical: Not on file   Tobacco Use    Smoking status: Former Smoker     Packs/day: 1.00     Years: 0.00     Pack years: 0.00     Start date: 1965     Quit date: 2008     Years since quittin.2    Smokeless tobacco: Never Used   Substance and Sexual Activity    Alcohol use: Not Currently    Drug use: No    Sexual activity: Yes     Partners: Female   Lifestyle    Physical activity     Days per week: Not on file     Minutes per session: Not on file    Stress: Not on file   Relationships    Social connections     Talks on phone: Not on file     Gets together: Not on file     Attends Gnosticist service: Not on file     Active member of  "club or organization: Not on file     Attends meetings of clubs or organizations: Not on file     Relationship status: Not on file   Other Topics Concern    Not on file   Social History Narrative    Not on file       Current Outpatient Medications on File Prior to Visit   Medication Sig Dispense Refill    ASPIR-LOW 81 mg EC tablet       atorvastatin (LIPITOR) 40 MG tablet Take 1 tablet by mouth.      AZOPT 1 % ophthalmic suspension       blood sugar diagnostic Strp 1 strip by Misc.(Non-Drug; Combo Route) route 3 (three) times daily. 100 strip 1    FREESTYLE LANCETS 28 gauge lancets       hydrochlorothiazide (HYDRODIURIL) 25 MG tablet Take 25 mg by mouth once daily.      lancets (ONE TOUCH DELICA LANCETS) 33 gauge Misc 1 lancet by Misc.(Non-Drug; Combo Route) route 3 (three) times daily before meals. 100 each 1    lisinopril (PRINIVIL,ZESTRIL) 20 MG tablet Take 20 mg by mouth once daily.      LUMIGAN 0.01 % Drop       metFORMIN (GLUCOPHAGE) 500 MG tablet Take 1 tablet by mouth.       No current facility-administered medications on file prior to visit.        Review of patient's allergies indicates:  No Known Allergies  Objective:     Vitals:    10/01/20 1319   BP: (!) 153/84   BP Location: Left arm   Patient Position: Sitting   BP Method: Large (Automatic)   Pulse: 78   Weight: 84.6 kg (186 lb 8.2 oz)   Height: 6' 4" (1.93 m)        Physical Exam   Constitutional: He is oriented to person, place, and time. He appears well-developed and well-nourished. No distress.   Eyes: No scleral icterus.   Neck: No JVD present. Carotid bruit is not present.   Cardiovascular: Regular rhythm and normal heart sounds. Exam reveals no gallop and no friction rub.   No murmur heard.  Pulmonary/Chest: Effort normal and breath sounds normal. No respiratory distress.   Musculoskeletal:         General: No edema.   Neurological: He is alert and oriented to person, place, and time.   Skin: Skin is warm and dry. He is not " diaphoretic.   Psychiatric: He has a normal mood and affect. His behavior is normal. Judgment and thought content normal.   Vitals reviewed.         Pt is hard of hearing.  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   Latest known visit with results is:   Admission on 02/20/2019, Discharged on 02/21/2019   Component Date Value Ref Range Status    WBC 02/20/2019 6.54  3.90 - 12.70 K/uL Final    RBC 02/20/2019 5.72  4.60 - 6.20 M/uL Final    Hemoglobin 02/20/2019 15.8  14.0 - 18.0 g/dL Final    Hematocrit 02/20/2019 50.7  40.0 - 54.0 % Final    Mean Corpuscular Volume 02/20/2019 89  82 - 98 fL Final    Mean Corpuscular Hemoglobin 02/20/2019 27.6  27.0 - 31.0 pg Final    Mean Corpuscular Hemoglobin Conc 02/20/2019 31.2* 32.0 - 36.0 g/dL Final    RDW 02/20/2019 14.8* 11.5 - 14.5 % Final    Platelets 02/20/2019 200  150 - 350 K/uL Final    MPV 02/20/2019 9.9  9.2 - 12.9 fL Final    Immature Granulocytes 02/20/2019 0.3  0.0 - 0.5 % Final    Gran # (ANC) 02/20/2019 4.1  1.8 - 7.7 K/uL Final    Immature Grans (Abs) 02/20/2019 0.02  0.00 - 0.04 K/uL Final    Lymph # 02/20/2019 1.6  1.0 - 4.8 K/uL Final    Mono # 02/20/2019 0.5  0.3 - 1.0 K/uL Final    Eos # 02/20/2019 0.2  0.0 - 0.5 K/uL Final    Baso # 02/20/2019 0.10  0.00 - 0.20 K/uL Final    nRBC 02/20/2019 0  0 /100 WBC Final    Gran% 02/20/2019 63.0  38.0 - 73.0 % Final    Lymph% 02/20/2019 24.8  18.0 - 48.0 % Final    Mono% 02/20/2019 7.2  4.0 - 15.0 % Final    Eosinophil% 02/20/2019 3.2  0.0 - 8.0 % Final    Basophil% 02/20/2019 1.5  0.0 - 1.9 % Final    Differential Method 02/20/2019 Automated   Final    Sodium 02/20/2019 137  136 - 145 mmol/L Final    Potassium 02/20/2019 4.3  3.5 - 5.1 mmol/L Final    Chloride 02/20/2019 102  95 - 110 mmol/L  Final    CO2 02/20/2019 25  23 - 29 mmol/L Final    Glucose 02/20/2019 128* 70 - 110 mg/dL Final    BUN, Bld 02/20/2019 15  8 - 23 mg/dL Final    Creatinine 02/20/2019 1.2  0.5 - 1.4 mg/dL Final    Calcium 02/20/2019 10.8* 8.7 - 10.5 mg/dL Final    Total Protein 02/20/2019 7.2  6.0 - 8.4 g/dL Final    Albumin 02/20/2019 4.3  3.5 - 5.2 g/dL Final    Total Bilirubin 02/20/2019 0.6  0.1 - 1.0 mg/dL Final    Alkaline Phosphatase 02/20/2019 74  55 - 135 U/L Final    AST 02/20/2019 16  10 - 40 U/L Final    ALT 02/20/2019 13  10 - 44 U/L Final    Anion Gap 02/20/2019 10  8 - 16 mmol/L Final    eGFR if African American 02/20/2019 >60.0  >60 mL/min/1.73 m^2 Final    eGFR if non  02/20/2019 59.2* >60 mL/min/1.73 m^2 Final    Prothrombin Time 02/20/2019 10.9  9.0 - 12.5 sec Final    INR 02/20/2019 1.1  0.8 - 1.2 Final    TSH 02/20/2019 1.120  0.400 - 4.000 uIU/mL Final    Cholesterol 02/20/2019 166  120 - 199 mg/dL Final    Triglycerides 02/20/2019 114  30 - 150 mg/dL Final    HDL 02/20/2019 44  40 - 75 mg/dL Final    LDL Cholesterol 02/20/2019 99.2  63.0 - 159.0 mg/dL Final    Hdl/Cholesterol Ratio 02/20/2019 26.5  20.0 - 50.0 % Final    Total Cholesterol/HDL Ratio 02/20/2019 3.8  2.0 - 5.0 Final    Non-HDL Cholesterol 02/20/2019 122  mg/dL Final    POC Creatinine 02/20/2019 1.3  0.5 - 1.4 mg/dL Final    Sample 02/20/2019 unknown   Final    POC PTWBT 02/20/2019 15.8* 9.7 - 14.3 sec Final    POC PTINR 02/20/2019 1.3* 0.9 - 1.2 Final    Sample 02/20/2019 unknown   Final    Specimen UA 02/20/2019 Urine, Clean Catch   Final    Color, UA 02/20/2019 Yellow  Yellow, Straw, Radha Final    Appearance, UA 02/20/2019 Clear  Clear Final    pH, UA 02/20/2019 7.0  5.0 - 8.0 Final    Specific Gravity, UA 02/20/2019 1.020  1.005 - 1.030 Final    Protein, UA 02/20/2019 Negative  Negative Final    Glucose, UA 02/20/2019 Negative  Negative Final    Ketones, UA 02/20/2019 Negative  Negative  Final    Bilirubin (UA) 02/20/2019 Negative  Negative Final    Occult Blood UA 02/20/2019 Negative  Negative Final    Nitrite, UA 02/20/2019 Negative  Negative Final    Leukocytes, UA 02/20/2019 Negative  Negative Final    AV mean gradient 02/21/2019 2.21  mmHg Final    Ao peak sha 02/21/2019 0.93  m/s Final    Ao VTI 02/21/2019 19.23  cm Final    IVS 02/21/2019 0.80  0.6 - 1.1 cm Final    LA size 02/21/2019 3.21  cm Final    Left Atrium Major Axis 02/21/2019 4.50  cm Final    Left Atrium Minor Axis 02/21/2019 4.70  cm Final    LVIDd 02/21/2019 4.40  3.5 - 6.0 cm Final    LVIDs 02/21/2019 3.10  2.1 - 4.0 cm Final    LVOT diameter 02/21/2019 2.03  cm Final    LVOT peak VTI 02/21/2019 18.31  cm Final    Posterior Wall 02/21/2019 0.80  0.6 - 1.1 cm Final    MV Peak A Sha 02/21/2019 0.84  m/s Final    E wave decelartion time 02/21/2019 249.51  msec Final    MV Peak E Sha 02/21/2019 0.51  m/s Final    PV Peak D Sha 02/21/2019 0.38  m/s Final    PV Peak S Sha 02/21/2019 0.29  m/s Final    RA Major Axis 02/21/2019 4.10  cm Final    RA Width 02/21/2019 3.10  cm Final    RVDD 02/21/2019 3.85  cm Final    Sinus 02/21/2019 2.97  cm Final    TAPSE 02/21/2019 2.78  cm Final    TDI LATERAL 02/21/2019 0.08   Final    TDI SEPTAL 02/21/2019 0.07   Final    LA WIDTH 02/21/2019 3.20  cm Final    LV Diastolic Volume 02/21/2019 100.25  mL Final    LV Systolic Volume 02/21/2019 33.74  mL Final    LVOT peak sha 02/21/2019 1.1  m/s Final    LV LATERAL E/E' RATIO 02/21/2019 6.38   Final    LV SEPTAL E/E' RATIO 02/21/2019 7.29   Final    FS 02/21/2019 30  % Final    LA volume 02/21/2019 40.14  cm3 Final    LV mass 02/21/2019 109.44  g Final    Left Ventricle Relative Wall Thick* 02/21/2019 0.36  cm Final    AV valve area 02/21/2019 3.08  cm2 Final    AV Velocity Ratio 02/21/2019 1.18   Final    AV index (prosthetic) 02/21/2019 0.95   Final    E/A ratio 02/21/2019 0.61   Final    Mean e' 02/21/2019  0.08   Final    Pulm vein S/D ratio 02/21/2019 0.76   Final    LVOT area 02/21/2019 3.23  cm2 Final    LVOT stroke volume 02/21/2019 59.23  cm3 Final    AV peak gradient 02/21/2019 3.46  mmHg Final    E/E' ratio 02/21/2019 6.80   Final    LV Systolic Volume Index 02/21/2019 15.8  mL/m2 Final    LV Diastolic Volume Index 02/21/2019 46.97  mL/m2 Final    LA Volume Index 02/21/2019 18.8  mL/m2 Final    LV Mass Index 02/21/2019 51.3  g/m2 Final    BSA 02/21/2019 2.12  m2 Final    Right Atrial Pressure (from IVC) 02/21/2019 3  mmHg Final    Hemoglobin A1C 02/20/2019 7.6* 4.0 - 5.6 % Final    Estimated Avg Glucose 02/20/2019 171* 68 - 131 mg/dL Final    RBC, UA 02/20/2019 1  0 - 4 /hpf Final    WBC, UA 02/20/2019 0  0 - 5 /hpf Final    Microscopic Comment 02/20/2019 SEE COMMENT   Final    POCT Glucose 02/20/2019 90  70 - 110 mg/dL Final    POCT Glucose 02/20/2019 197* 70 - 110 mg/dL Final    Sodium 02/21/2019 137  136 - 145 mmol/L Final    Potassium 02/21/2019 3.8  3.5 - 5.1 mmol/L Final    Chloride 02/21/2019 104  95 - 110 mmol/L Final    CO2 02/21/2019 22* 23 - 29 mmol/L Final    Glucose 02/21/2019 133* 70 - 110 mg/dL Final    BUN, Bld 02/21/2019 16  8 - 23 mg/dL Final    Creatinine 02/21/2019 1.0  0.5 - 1.4 mg/dL Final    Calcium 02/21/2019 10.2  8.7 - 10.5 mg/dL Final    Total Protein 02/21/2019 6.6  6.0 - 8.4 g/dL Final    Albumin 02/21/2019 3.9  3.5 - 5.2 g/dL Final    Total Bilirubin 02/21/2019 0.6  0.1 - 1.0 mg/dL Final    Alkaline Phosphatase 02/21/2019 69  55 - 135 U/L Final    AST 02/21/2019 14  10 - 40 U/L Final    ALT 02/21/2019 13  10 - 44 U/L Final    Anion Gap 02/21/2019 11  8 - 16 mmol/L Final    eGFR if African American 02/21/2019 >60.0  >60 mL/min/1.73 m^2 Final    eGFR if non African American 02/21/2019 >60.0  >60 mL/min/1.73 m^2 Final    Magnesium 02/21/2019 2.0  1.6 - 2.6 mg/dL Final    Phosphorus 02/21/2019 4.0  2.7 - 4.5 mg/dL Final    WBC 02/21/2019 5.45  3.90  - 12.70 K/uL Final    RBC 02/21/2019 5.68  4.60 - 6.20 M/uL Final    Hemoglobin 02/21/2019 15.8  14.0 - 18.0 g/dL Final    Hematocrit 02/21/2019 48.8  40.0 - 54.0 % Final    Mean Corpuscular Volume 02/21/2019 86  82 - 98 fL Final    Mean Corpuscular Hemoglobin 02/21/2019 27.8  27.0 - 31.0 pg Final    Mean Corpuscular Hemoglobin Conc 02/21/2019 32.4  32.0 - 36.0 g/dL Final    RDW 02/21/2019 14.6* 11.5 - 14.5 % Final    Platelets 02/21/2019 201  150 - 350 K/uL Final    MPV 02/21/2019 9.8  9.2 - 12.9 fL Final    Immature Granulocytes 02/21/2019 0.2  0.0 - 0.5 % Final    Gran # (ANC) 02/21/2019 2.9  1.8 - 7.7 K/uL Final    Immature Grans (Abs) 02/21/2019 0.01  0.00 - 0.04 K/uL Final    Lymph # 02/21/2019 1.6  1.0 - 4.8 K/uL Final    Mono # 02/21/2019 0.5  0.3 - 1.0 K/uL Final    Eos # 02/21/2019 0.3  0.0 - 0.5 K/uL Final    Baso # 02/21/2019 0.11  0.00 - 0.20 K/uL Final    nRBC 02/21/2019 0  0 /100 WBC Final    Gran% 02/21/2019 53.7  38.0 - 73.0 % Final    Lymph% 02/21/2019 29.4  18.0 - 48.0 % Final    Mono% 02/21/2019 8.8  4.0 - 15.0 % Final    Eosinophil% 02/21/2019 5.9  0.0 - 8.0 % Final    Basophil% 02/21/2019 2.0* 0.0 - 1.9 % Final    Differential Method 02/21/2019 Automated   Final    Troponin I 02/21/2019 0.024  0.000 - 0.026 ng/mL Final    CPK 02/21/2019 98  20 - 200 U/L Final    CPK MB 02/21/2019 1.0  0.1 - 6.5 ng/mL Final    MB% 02/21/2019 1.0  0.0 - 5.0 % Final    aPTT 02/21/2019 30.5  21.0 - 32.0 sec Final    Prothrombin Time 02/21/2019 11.3  9.0 - 12.5 sec Final    INR 02/21/2019 1.1  0.8 - 1.2 Final    POCT Glucose 02/21/2019 165* 70 - 110 mg/dL Final    POCT Glucose 02/20/2019 128* 70 - 110 mg/dL Final    POCT Glucose 02/21/2019 128* 70 - 110 mg/dL Final   (      ECG: NSR, PVC, possible LVH  Unchanged    Assessment:     1. History of stroke without residual deficits    2. Essential hypertension    3. Mixed hyperlipidemia    4. Hypertriglyceridemia      Plan:   Klever armas  seen today for follow-up.    Diagnoses and all orders for this visit:    History of stroke without residual deficits    Essential hypertension    Mixed hyperlipidemia    Hypertriglyceridemia    Other orders  -     IN OFFICE EKG 12-LEAD (to Muse)     Will not increase pt's antihypertensive medications since pt forgot to take all of his AM medications this morning and since pt's blood pressure at home is always 125/70    F/u with Dr Welch next month; pt gets labs done with Dr Welch    Strategies to improve compliance discussed    Same meds    RTC 6 months    Follow up in about 6 months (around 4/1/2021).

## 2020-10-22 ENCOUNTER — OFFICE VISIT (OUTPATIENT)
Dept: PODIATRY | Facility: CLINIC | Age: 75
End: 2020-10-22
Payer: MEDICARE

## 2020-10-22 VITALS
HEIGHT: 76 IN | SYSTOLIC BLOOD PRESSURE: 142 MMHG | DIASTOLIC BLOOD PRESSURE: 81 MMHG | HEART RATE: 70 BPM | WEIGHT: 188.94 LBS | BODY MASS INDEX: 23.01 KG/M2 | RESPIRATION RATE: 18 BRPM

## 2020-10-22 DIAGNOSIS — B35.1 ONYCHOMYCOSIS DUE TO DERMATOPHYTE: ICD-10-CM

## 2020-10-22 DIAGNOSIS — E11.49 TYPE II DIABETES MELLITUS WITH NEUROLOGICAL MANIFESTATIONS: Primary | ICD-10-CM

## 2020-10-22 PROCEDURE — 11721 PR DEBRIDEMENT OF NAILS, 6 OR MORE: ICD-10-PCS | Mod: Q9,S$PBB,, | Performed by: PODIATRIST

## 2020-10-22 PROCEDURE — 99999 PR PBB SHADOW E&M-EST. PATIENT-LVL IV: CPT | Mod: PBBFAC,,, | Performed by: PODIATRIST

## 2020-10-22 PROCEDURE — 99499 UNLISTED E&M SERVICE: CPT | Mod: S$PBB,,, | Performed by: PODIATRIST

## 2020-10-22 PROCEDURE — 99214 OFFICE O/P EST MOD 30 MIN: CPT | Mod: PBBFAC,25 | Performed by: PODIATRIST

## 2020-10-22 PROCEDURE — 11721 DEBRIDE NAIL 6 OR MORE: CPT | Mod: PBBFAC | Performed by: PODIATRIST

## 2020-10-22 PROCEDURE — 11721 DEBRIDE NAIL 6 OR MORE: CPT | Mod: Q9,S$PBB,, | Performed by: PODIATRIST

## 2020-10-22 PROCEDURE — 99499 NO LOS: ICD-10-PCS | Mod: S$PBB,,, | Performed by: PODIATRIST

## 2020-10-22 PROCEDURE — 99999 PR PBB SHADOW E&M-EST. PATIENT-LVL IV: ICD-10-PCS | Mod: PBBFAC,,, | Performed by: PODIATRIST

## 2020-10-27 NOTE — PROGRESS NOTES
Subjective:      Patient ID: Klever Link is a 75 y.o. male.    Chief Complaint: PCP (Petrona Welch MD F/U 11/11/20), Diabetic Foot Exam, Nail Care, and Nail Problem    Klever is a 75 y.o. male who presents to the clinic for evaluation and treatment of high risk feet. Klever has a past medical history of Diabetes mellitus, Expressive aphasia (2/12/2015), Hyperlipidemia, Hypertension, Stroke, and TIA (transient ischemic attack) (02/2019). The patient's chief complaint is long, thick toenails. This patient has documented high risk feet requiring routine maintenance secondary to peripheral vascular disease.    PCP: Petrona Welch MD    Date Last Seen by PCP:   Chief Complaint   Patient presents with    PCP     Petrona Welch MD F/U 11/11/20    Diabetic Foot Exam    Nail Care    Nail Problem       Current shoe gear:  Casual shoes      Hemoglobin A1C   Date Value Ref Range Status   02/20/2019 7.6 (H) 4.0 - 5.6 % Final     Comment:     ADA Screening Guidelines:  5.7-6.4%  Consistent with prediabetes  >or=6.5%  Consistent with diabetes  High levels of fetal hemoglobin interfere with the HbA1C  assay. Heterozygous hemoglobin variants (HbS, HgC, etc)do  not significantly interfere with this assay.   However, presence of multiple variants may affect accuracy.     02/12/2015 13.5 (H) 4.5 - 6.2 % Final       Review of Systems   Constitution: Negative for chills, decreased appetite and fever.   Cardiovascular: Negative for leg swelling.   Respiratory: Negative for cough and shortness of breath.    Skin: Positive for dry skin, itching and nail changes. Negative for color change, flushing, poor wound healing, rash, skin cancer and suspicious lesions.   Musculoskeletal: Negative for arthritis, back pain, joint pain, joint swelling and myalgias.   Gastrointestinal: Negative for nausea and vomiting.   Neurological: Positive for numbness. Negative for loss of balance and paresthesias.           Objective:  "      Vitals:    10/22/20 0937   BP: (!) 142/81   Pulse: 70   Resp: 18   Weight: 85.7 kg (188 lb 15 oz)   Height: 6' 4" (1.93 m)   PainSc: 0-No pain        Physical Exam  Vitals signs and nursing note reviewed.   Constitutional:       Appearance: He is well-developed. He is not diaphoretic.   Cardiovascular:      Comments: Dorsalis pedis and posterior tibial pulses are diminished Bilaterally. Toes are cool to touch. Feet are warm proximally.There is decreased digital hair. Skin is atrophic, slightly hyperpigmented, and mildly edematous      Musculoskeletal: Normal range of motion.         General: No tenderness or signs of injury.      Comments: Adequate joint range of motion without pain, limitation, nor crepitation Bilateral feet and ankle joints. Muscle strength is 5/5 in all groups bilaterally.         Skin:     General: Skin is warm and dry.      Coloration: Skin is not pale.      Findings: No abrasion, acne, bruising, burn, ecchymosis, erythema or lesion.      Comments: Nails x 10  are elongated by  3-4mm's, thickened by 2-4 mm's, dystrophic, and are darkened in  coloration . Xerosis Bilaterally. No open lesions noted.     Neurological:      Mental Status: He is alert and oriented to person, place, and time.      Comments: [place] 5.07 monofilamant testing is diminished Dheeraj feet. Sharp/dull sensation diminished Bilaterally. Light touch absent Bilaterally.       Psychiatric:         Behavior: Behavior normal.               Assessment:       Encounter Diagnoses   Name Primary?    Type II diabetes mellitus with neurological manifestations Yes    Onychomycosis due to dermatophyte          Plan:       Klever was seen today for pcp, diabetic foot exam, nail care and nail problem.    Diagnoses and all orders for this visit:    Type II diabetes mellitus with neurological manifestations    Onychomycosis due to dermatophyte      I counseled the patient on his conditions, their implications and medical " management.        - Shoe inspection. Diabetic Foot Education. Patient reminded of the importance of good nutrition and blood sugar control to help prevent podiatric complications of diabetes. Patient instructed on proper foot hygeine. We discussed wearing proper shoe gear, daily foot inspections, never walking without protective shoe gear, never putting sharp instruments to feet, routine podiatric nail visits every 2-3 months.      - With patient's permission, nails were aggressively reduced and debrided x 10 to their soft tissue attachment mechanically and with electric , removing all offending nail and debris. Patient relates relief following the procedure. He will continue to monitor the areas daily, inspect his feet, wear protective shoe gear when ambulatory, moisturizer to maintain skin integrity and follow in this office in approximately 2-3 months, sooner p.r.n.

## 2020-11-20 ENCOUNTER — CLINICAL SUPPORT (OUTPATIENT)
Dept: URGENT CARE | Facility: CLINIC | Age: 75
End: 2020-11-20
Payer: MEDICARE

## 2020-11-20 ENCOUNTER — HOSPITAL ENCOUNTER (OUTPATIENT)
Dept: RADIOLOGY | Facility: OTHER | Age: 75
Discharge: HOME | End: 2020-11-20
Attending: INTERNAL MEDICINE
Payer: MEDICARE

## 2020-11-20 DIAGNOSIS — I65.29 STENOSIS OF CAROTID ARTERY, UNSPECIFIED LATERALITY: ICD-10-CM

## 2020-11-20 DIAGNOSIS — Z20.822 COVID-19 RULED OUT: Primary | ICD-10-CM

## 2020-11-20 LAB
CTP QC/QA: YES
SARS-COV-2 RDRP RESP QL NAA+PROBE: NEGATIVE

## 2020-11-20 PROCEDURE — 93880 EXTRACRANIAL BILAT STUDY: CPT | Mod: 26,,, | Performed by: RADIOLOGY

## 2020-11-20 PROCEDURE — 93880 EXTRACRANIAL BILAT STUDY: CPT | Mod: TC

## 2020-11-20 PROCEDURE — U0002: ICD-10-PCS | Mod: CR,QW,S$GLB, | Performed by: PHYSICIAN ASSISTANT

## 2020-11-20 PROCEDURE — 93880 US CAROTID BILATERAL: ICD-10-PCS | Mod: 26,,, | Performed by: RADIOLOGY

## 2020-11-20 PROCEDURE — U0002 COVID-19 LAB TEST NON-CDC: HCPCS | Mod: CR,QW,S$GLB, | Performed by: PHYSICIAN ASSISTANT

## 2020-11-20 NOTE — PROGRESS NOTES
"  Pt received a rapid covid swab    Your test was NEGATIVE for COVID-19 (coronavirus).      You may leave home and/or return to work when the following conditions are met:   24 hours fever free without fever-reducing medications AND   Improved symptoms   You have not met the conditions of a closed exposure       A "close exposure" is defined as anyone who has had an exposure (masked or unmasked) to a known COVID -19 positive person within 6 ft for longer than 15 minutes. If your exposure meets this definition you are required by CDC guidelines to quarantine for 14 days from time of exposure regardless of test status.      Additional instructions:  · Social distance per your local guidelines  · Call ahead before visiting your doctor.  · Wear a facemask when around others who do not live in your household.  · Cover your coughs and sneezes.  · Wash your hands often with soap and water; hand  can be used, too.      If your symptoms worsen or if you have any other concerns, please contact [facility] at [phone].     Sincerely,    Sita Chaney MA    "

## 2020-12-05 ENCOUNTER — HOSPITAL ENCOUNTER (EMERGENCY)
Facility: HOSPITAL | Age: 75
Discharge: HOME | End: 2020-12-05
Attending: EMERGENCY MEDICINE
Payer: MEDICARE

## 2020-12-05 VITALS
OXYGEN SATURATION: 95 % | WEIGHT: 187 LBS | TEMPERATURE: 98 F | HEART RATE: 70 BPM | SYSTOLIC BLOOD PRESSURE: 154 MMHG | DIASTOLIC BLOOD PRESSURE: 80 MMHG | HEIGHT: 75 IN | BODY MASS INDEX: 23.25 KG/M2 | RESPIRATION RATE: 20 BRPM

## 2020-12-05 DIAGNOSIS — G45.0 VERTEBROBASILAR INSUFFICIENCY: Primary | ICD-10-CM

## 2020-12-05 DIAGNOSIS — R79.89 ELEVATED TROPONIN: ICD-10-CM

## 2020-12-05 DIAGNOSIS — I67.1 SUPRACLINOID CAROTID ARTERY ANEURYSM, SMALL: ICD-10-CM

## 2020-12-05 DIAGNOSIS — R42 DIZZINESS: ICD-10-CM

## 2020-12-05 DIAGNOSIS — R42 ORTHOSTATIC DIZZINESS: ICD-10-CM

## 2020-12-05 PROBLEM — K14.8 TONGUE LESION: Status: ACTIVE | Noted: 2020-12-05

## 2020-12-05 LAB
ALBUMIN SERPL BCP-MCNC: 4.3 G/DL (ref 3.5–5.2)
ALP SERPL-CCNC: 76 U/L (ref 55–135)
ALT SERPL W/O P-5'-P-CCNC: 19 U/L (ref 10–44)
ANION GAP SERPL CALC-SCNC: 11 MMOL/L (ref 8–16)
AST SERPL-CCNC: 17 U/L (ref 10–40)
BASOPHILS # BLD AUTO: 0.06 K/UL (ref 0–0.2)
BASOPHILS NFR BLD: 1 % (ref 0–1.9)
BILIRUB SERPL-MCNC: 0.5 MG/DL (ref 0.1–1)
BUN SERPL-MCNC: 17 MG/DL (ref 8–23)
CALCIUM SERPL-MCNC: 9.8 MG/DL (ref 8.7–10.5)
CHLORIDE SERPL-SCNC: 100 MMOL/L (ref 95–110)
CO2 SERPL-SCNC: 26 MMOL/L (ref 23–29)
CREAT SERPL-MCNC: 1.1 MG/DL (ref 0.5–1.4)
DIFFERENTIAL METHOD: ABNORMAL
EOSINOPHIL # BLD AUTO: 0.2 K/UL (ref 0–0.5)
EOSINOPHIL NFR BLD: 3.3 % (ref 0–8)
ERYTHROCYTE [DISTWIDTH] IN BLOOD BY AUTOMATED COUNT: 14.6 % (ref 11.5–14.5)
EST. GFR  (AFRICAN AMERICAN): >60 ML/MIN/1.73 M^2
EST. GFR  (NON AFRICAN AMERICAN): >60 ML/MIN/1.73 M^2
GLUCOSE SERPL-MCNC: 213 MG/DL (ref 70–110)
HCT VFR BLD AUTO: 49.7 % (ref 40–54)
HGB BLD-MCNC: 15.2 G/DL (ref 14–18)
IMM GRANULOCYTES # BLD AUTO: 0.02 K/UL (ref 0–0.04)
IMM GRANULOCYTES NFR BLD AUTO: 0.3 % (ref 0–0.5)
LYMPHOCYTES # BLD AUTO: 1.2 K/UL (ref 1–4.8)
LYMPHOCYTES NFR BLD: 19.1 % (ref 18–48)
MAGNESIUM SERPL-MCNC: 1.8 MG/DL (ref 1.6–2.6)
MCH RBC QN AUTO: 26.7 PG (ref 27–31)
MCHC RBC AUTO-ENTMCNC: 30.6 G/DL (ref 32–36)
MCV RBC AUTO: 87 FL (ref 82–98)
MONOCYTES # BLD AUTO: 0.4 K/UL (ref 0.3–1)
MONOCYTES NFR BLD: 5.8 % (ref 4–15)
NEUTROPHILS # BLD AUTO: 4.2 K/UL (ref 1.8–7.7)
NEUTROPHILS NFR BLD: 70.5 % (ref 38–73)
NRBC BLD-RTO: 0 /100 WBC
PLATELET # BLD AUTO: 204 K/UL (ref 150–350)
PMV BLD AUTO: 9.8 FL (ref 9.2–12.9)
POTASSIUM SERPL-SCNC: 4.3 MMOL/L (ref 3.5–5.1)
PROT SERPL-MCNC: 7.4 G/DL (ref 6–8.4)
RBC # BLD AUTO: 5.7 M/UL (ref 4.6–6.2)
SODIUM SERPL-SCNC: 137 MMOL/L (ref 136–145)
TROPONIN I SERPL DL<=0.01 NG/ML-MCNC: 0.03 NG/ML (ref 0–0.03)
TROPONIN I SERPL DL<=0.01 NG/ML-MCNC: 0.03 NG/ML (ref 0–0.03)
WBC # BLD AUTO: 6.02 K/UL (ref 3.9–12.7)

## 2020-12-05 PROCEDURE — 93010 ELECTROCARDIOGRAM REPORT: CPT | Mod: ,,, | Performed by: INTERNAL MEDICINE

## 2020-12-05 PROCEDURE — 93010 ELECTROCARDIOGRAM REPORT: CPT | Mod: 76,,, | Performed by: INTERNAL MEDICINE

## 2020-12-05 PROCEDURE — 93005 ELECTROCARDIOGRAM TRACING: CPT

## 2020-12-05 PROCEDURE — 83735 ASSAY OF MAGNESIUM: CPT

## 2020-12-05 PROCEDURE — 25500020 PHARM REV CODE 255: Performed by: EMERGENCY MEDICINE

## 2020-12-05 PROCEDURE — 99285 EMERGENCY DEPT VISIT HI MDM: CPT | Mod: 25

## 2020-12-05 PROCEDURE — 99285 EMERGENCY DEPT VISIT HI MDM: CPT | Mod: ,,, | Performed by: EMERGENCY MEDICINE

## 2020-12-05 PROCEDURE — 99285 PR EMERGENCY DEPT VISIT,LEVEL V: ICD-10-PCS | Mod: ,,, | Performed by: EMERGENCY MEDICINE

## 2020-12-05 PROCEDURE — 99285 PR EMERGENCY DEPT VISIT,LEVEL V: ICD-10-PCS | Mod: ,,, | Performed by: PSYCHIATRY & NEUROLOGY

## 2020-12-05 PROCEDURE — 84484 ASSAY OF TROPONIN QUANT: CPT

## 2020-12-05 PROCEDURE — 99285 EMERGENCY DEPT VISIT HI MDM: CPT | Mod: ,,, | Performed by: PSYCHIATRY & NEUROLOGY

## 2020-12-05 PROCEDURE — 80053 COMPREHEN METABOLIC PANEL: CPT

## 2020-12-05 PROCEDURE — 93010 EKG 12-LEAD: ICD-10-PCS | Mod: 76,,, | Performed by: INTERNAL MEDICINE

## 2020-12-05 PROCEDURE — 85025 COMPLETE CBC W/AUTO DIFF WBC: CPT

## 2020-12-05 RX ORDER — ATORVASTATIN CALCIUM 80 MG/1
80 TABLET, FILM COATED ORAL DAILY
Qty: 90 TABLET | Refills: 3 | Status: SHIPPED | OUTPATIENT
Start: 2020-12-05 | End: 2022-02-22 | Stop reason: SDUPTHER

## 2020-12-05 RX ADMIN — IOHEXOL 100 ML: 350 INJECTION, SOLUTION INTRAVENOUS at 09:12

## 2020-12-05 NOTE — ED NOTES
LOC: The patient is awake, alert, and oriented to place, time, situation. Affect is appropriate.  Speech is appropriate and clear.     APPEARANCE: Patient resting comfortably in no acute distress.  Patient is clean and well groomed.    SKIN: The skin is warm and dry; color consistent with ethnicity.  Patient has normal skin turgor and moist mucus membranes.  Skin intact; no breakdown or bruising noted.     MUSCULOSKELETAL: Patient moving upper and lower extremities without difficulty.  Denies weakness.     RESPIRATORY: Airway is open and patent. Respirations spontaneous, even, easy, and non-labored.  Patient has a normal effort and rate.  No accessory muscle use noted. Denies cough.     CARDIAC:  Normal rhythm and rate noted.  No peripheral edema noted. No complaints of chest pain.      ABDOMEN: Soft and non tender to palpation.  No distention noted.     NEUROLOGIC: Eyes open spontaneously.  Behavior appropriate to situation.  Follows commands; facial expression symmetrical.  Purposeful motor response noted; normal sensation in all extremities. Pt unable to ambulate well due to dizziness.

## 2020-12-05 NOTE — HPI
"Klever Link is a 75 y.o. man with PMHx prior R MCA stroke (2/19, ESUS; no residual deficits), L ICA stenosis, HTN, DM and HLD who is being evaluated by the Vascular Neurology service after developing acute episodes of dizziness/vertigo. Pt states when he woke up yesterday morning and sat at the edge of his bed, he felt like the room was spinning "like a carousel sensation". The episode lasted only about 10 minutes and then spontaneously resolved. Then upon waking morning of presentation, pt experienced the same transient episode. He also reports some dizziness upon tilting his head backwards, which also resolves once he returns to resting position. Pt denies associated extremity weakness or numbness, HA, or changes to his hearing/ringing in his ears at that time. Pt denies other prior similar episodes in the past and denies any recent medication changes. He endorses that he has not been drinking water like he should and very well could have been dehydrated over the last couple days. He endorses compliance with home ASA and statin. States he did take his BP meds this AM.   "

## 2020-12-05 NOTE — ED PROVIDER NOTES
Encounter Date: 2020       History     Chief Complaint   Patient presents with    Dizziness     dizziness upon awakening x 2 days, mild nausea, denies cp, sob, lightheaded, fatigue, fever     Klever Link is a 75 y.o. male who presents with the chief complaint dizziness. The symptoms started yesterday and have presented in episodes lasting about 20 mins. The attacks occurred upon awakening yesterday and today. Positions that worsen symptoms: none. Previous workup/treatments: none. Associated ear symptoms: none. Associated CNS symptoms: none. Recent infections: none. Head trauma: denied. Drug ingestion: none.           Review of patient's allergies indicates:  No Known Allergies  Past Medical History:   Diagnosis Date    Diabetes mellitus     Expressive aphasia 2015    Hyperlipidemia     Hypertension     Stroke     TIA (transient ischemic attack) 2019     Past Surgical History:   Procedure Laterality Date    KNEE SURGERY Bilateral      Family History   Problem Relation Age of Onset    Diabetes Mother     Ovarian cancer Mother     Hypertension Father     Stroke Father     Diabetes Sister     Diabetes Sister     Diabetes Sister     Stroke Sister      Social History     Tobacco Use    Smoking status: Former Smoker     Packs/day: 1.00     Years: 0.00     Pack years: 0.00     Start date: 1965     Quit date: 2008     Years since quittin.3    Smokeless tobacco: Never Used   Substance Use Topics    Alcohol use: Not Currently    Drug use: No     Review of Systems   Constitutional: Negative for fever.   HENT: Negative for sore throat.    Respiratory: Negative for shortness of breath.    Cardiovascular: Negative for chest pain.   Gastrointestinal: Negative for nausea.   Genitourinary: Negative for dysuria.   Musculoskeletal: Negative for back pain.   Skin: Negative for rash.   Neurological: Negative for weakness.   Hematological: Does not bruise/bleed easily.       Physical  Exam     Initial Vitals [12/05/20 0641]   BP Pulse Resp Temp SpO2   (!) 189/89 83 18 98.9 °F (37.2 °C) 95 %      MAP       --         Physical Exam    Nursing note and vitals reviewed.  Constitutional: He appears well-developed and well-nourished. He is not diaphoretic. No distress.   HENT:   Head: Normocephalic and atraumatic.   Right Ear: External ear normal.   Left Ear: External ear normal.   Nose: Nose normal.   Mouth/Throat: Oropharynx is clear and moist.   Eyes: Conjunctivae and EOM are normal. Right eye exhibits no discharge. Left eye exhibits no discharge.   Neck: Normal range of motion.   Cardiovascular: Normal rate and regular rhythm.   Pulmonary/Chest: No stridor. No respiratory distress.   Abdominal: He exhibits no distension.   Musculoskeletal: Normal range of motion. No edema.   Neurological: He is alert and oriented to person, place, and time. He has normal strength. No cranial nerve deficit or sensory deficit. Gait abnormal. Coordination normal. GCS score is 15. GCS eye subscore is 4. GCS verbal subscore is 5. GCS motor subscore is 6.   Unsteady gait   Skin: Skin is warm and dry. Capillary refill takes less than 2 seconds. No rash noted. No erythema. No pallor.   Psychiatric: He has a normal mood and affect. Thought content normal.         ED Course   Procedures  Labs Reviewed   COMPREHENSIVE METABOLIC PANEL - Abnormal; Notable for the following components:       Result Value    Glucose 213 (*)     All other components within normal limits   CBC W/ AUTO DIFFERENTIAL - Abnormal; Notable for the following components:    MCH 26.7 (*)     MCHC 30.6 (*)     RDW 14.6 (*)     All other components within normal limits   TROPONIN I - Abnormal; Notable for the following components:    Troponin I 0.030 (*)     All other components within normal limits   MAGNESIUM     EKG Readings: (Independently Interpreted)   ST abnormality leads III and aVF not seen in previous EKG       Imaging Results          MRI Brain  Without Contrast (Final result)  Result time 12/05/20 08:12:50    Final result by Dale Vázquez MD (12/05/20 08:12:50)                 Impression:      1. No acute intracranial findings.  No findings identified to account for the patient's reported symptoms.  2. Anticipated maturation of remote infarct involving the right precentral gyrus and right superior temporal gyrus.  Otherwise no substantial change relative to prior MRI of the brain performed 02/20/2019.      Electronically signed by: Dale Vázquez  Date:    12/05/2020  Time:    08:12             Narrative:    EXAMINATION:  MRI BRAIN WITHOUT CONTRAST    CLINICAL HISTORY:  Dizziness, non-specific;Rule out stroke;    TECHNIQUE:  Multiplanar multisequence MR imaging of the brain was performed without contrast.    COMPARISON:  MRI brain performed 02/20/2019    FINDINGS:  Parenchyma: There is no restricted diffusion to suggest acute or subacute ischemic infarct.Generalized age-related parenchymal volume loss is noted.Nonspecific areas of T2/FLAIR hyperintense signal in the supratentorial white matter may be on the basis of chronic small vessel ischemic disease.  Anticipated maturation of remote infarcts involving the right frontal and temporal lobes seen on the 02/20/2019 MRI.  Remote lacunar infarct noted in right ewa shun.    Additional comments: There is no midline shift, abnormal extra-axial fluid collection, or acute intracranial hemorrhage. The basal cisterns are patent.    Ventricles: Normal.    Flow voids: The normal major intracranial arterial flow voids are visualized.    Sinuses and mastoid air cells: Relatively modest paranasal sinus mucosal thickening without definite fluid levels.    Orbits: Normal    Midline structures: The pituitary and craniocervical junction are normal.    Degenerative findings are partially visualized in the upper cervical spine.    Marrow: Normal                                 X-Ray Chest AP Portable (In process)                   Medical Decision Making:   History:   Old Medical Records: I decided to obtain old medical records.  Old Records Summarized: records from clinic visits and records from previous admission(s).  Initial Assessment:   75 y.o. male patient with vertigo of acute onset in setting of stroke (02/2019), HTN, HLD, and DM. Rule out stroke. History notable for vertigo on awakening. Denies chest pain, SOB, light headed, tinnitus, hearing loss, or FND. P/E notable for abnormal gait, absent exacerbating features. Labs notable for mild elevation in troponin (0.030). CXR pending. MRI w/o does not reveal an acute intracranial process.                  Differential Diagnosis:   Differential diagnosis includes but is not limited to; stroke, MI, medication induced      Clinical Tests:   Lab Tests: Ordered and Reviewed  The following lab test(s) were unremarkable: CBC, CMP and Troponin  Radiological Study: Ordered and Reviewed  Medical Tests: Ordered and Reviewed              Attending Attestation:   Physician Attestation Statement for Resident:  As the supervising MD   Physician Attestation Statement: I have personally seen and examined this patient.   I agree with the above history. -:   As the supervising MD I agree with the above PE.    As the supervising MD I agree with the above treatment, course, plan, and disposition.   -: 75-year-old male presents to the emergency department for vertigo on waking up.  He also reports vertigo waking up yesterday morning lasted for approximately 15 min and resolved, had another episode last night with lying in bed.  Today he woke up with vertigo, nausea, unsteady gait.  No focal weakness or numbness no slurred speech  History of TIA with slurred speech  No head injury no fever no chest pain or shortness of breath  No tinnitus no ear ache no recent viral infection    No focal weakness and numbness, unsteady gait, mild tremors upper extremities with finger-to-nose  MRI brain without  contrast no acute stroke but 3 remote areas of stroke  Vascular Neurology consult:  High risk patient, ongoing symptoms                    ED Course as of Dec 05 1310   Sat Dec 05, 2020   0812 Mild troponin elevation   Troponin I(!): 0.030 [AM]   0858 No acute intracranial findings   MRI Brain Without Contrast [AM]   0936 Consult to Vascular Neurology; recommend CTA.     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 Asymptomatic patient, non specific EKG changes with mild troponin elevation not concerning; patient does not require stress testing.       [AM]      ED Course User Index  [AM] Julius Vaughan MD            Clinical Impression:       ICD-10-CM ICD-9-CM   1. Dizziness  R42 780.4                                               Almaz Gee MD  12/07/20 0906

## 2020-12-05 NOTE — DISCHARGE INSTRUCTIONS
Today, your evaluation for dizziness was due to orthostatic hypotension and vertebrobasilar insufficiency. Your dizziness was not explainable by laboratory findings. 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 You were evaluated by Vascular Neurology in the emergency department. You will go home with a prescription for an increased dose of Lipitor and referrals to see Vascular Neurology and Interventional Radiology. Please follow-up with your primary care doctor.     Tests today showed:   Labs Reviewed   COMPREHENSIVE METABOLIC PANEL - Abnormal; Notable for the following components:       Result Value    Glucose 213 (*)     All other components within normal limits   CBC W/ AUTO DIFFERENTIAL - Abnormal; Notable for the following components:    MCH 26.7 (*)     MCHC 30.6 (*)     RDW 14.6 (*)     All other components within normal limits   TROPONIN I - Abnormal; Notable for the following components:    Troponin I 0.030 (*)     All other components within normal limits   TROPONIN I - Abnormal; Notable for the following components:    Troponin I 0.031 (*)     All other components within normal limits   MAGNESIUM     CTA Head and Neck (xpd)   Final Result   Abnormal      1. CT head without definite acute intracranial findings.   2. CTA head neck without evidence of acute large vessel occlusion or flow-limiting stenosis.  Cervical and intracranial atherosclerotic disease and associated vessel narrowing without substantial interval change relative to prior CTA head neck examination, 02/19/2019.  Details as per report body.   3. 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  This may be minimally increased in mediolateral dimension when compared to prior MRA of 02/12/2015.   4. Peripherally enhancing cystic appearing lesion centered at the tongue base extending into the left vallecula.  While this may potentially represent  a benign vallecular cyst, the possibility of a mucosal based neoplasm is not excluded.  As such, correlation with direct visualization and possible referral to otolaryngology with consideration of tissue sampling would be recommended to exclude malignancy.   5. Additional details, as per report body.   This report was flagged in Epic as abnormal.         Electronically signed by: Dale Vázquez   Date:    12/05/2020   Time:    10:45      X-Ray Chest AP Portable   Final Result      No acute abnormality.         Electronically signed by: Dale Oscar MD   Date:    12/05/2020   Time:    09:05      MRI Brain Without Contrast   Final Result      1. No acute intracranial findings.  No findings identified to account for the patient's reported symptoms.   2. Anticipated maturation of remote infarct involving the right precentral gyrus and right superior temporal gyrus.  Otherwise no substantial change relative to prior MRI of the brain performed 02/20/2019.         Electronically signed by: Dale Vázquez   Date:    12/05/2020   Time:    08:12          Treatments you had today:   Medications   iohexoL (OMNIPAQUE 350) injection 100 mL (100 mLs Intravenous Given 12/5/20 0958)       Follow-Up Plan:  - Follow-up with primary care doctor within 3 - 5 days  - Additional testing and/or evaluation as directed by your primary doctor    Return to the Emergency Department for symptoms including but not limited to: worsening symptoms, shortness of breath or chest pain, weakness or numbness, severe sudden onset headache, vomiting with inability to hold down fluids, fevers greater than 100.4°F, passing out/fainting/unconsciousness, or other concerning symptoms.

## 2020-12-05 NOTE — SUBJECTIVE & OBJECTIVE
Past Medical History:   Diagnosis Date    Diabetes mellitus     Expressive aphasia 2015    Hyperlipidemia     Hypertension     Stroke     TIA (transient ischemic attack) 2019     Past Surgical History:   Procedure Laterality Date    KNEE SURGERY Bilateral      Family History   Problem Relation Age of Onset    Diabetes Mother     Ovarian cancer Mother     Hypertension Father     Stroke Father     Diabetes Sister     Diabetes Sister     Diabetes Sister     Stroke Sister      Social History     Tobacco Use    Smoking status: Former Smoker     Packs/day: 1.00     Years: 0.00     Pack years: 0.00     Start date: 1965     Quit date: 2008     Years since quittin.3    Smokeless tobacco: Never Used   Substance Use Topics    Alcohol use: Not Currently    Drug use: No     Review of patient's allergies indicates:  No Known Allergies    Medications: I have reviewed the current medication administration record.    (Not in a hospital admission)      Review of Systems   Constitutional: Positive for activity change. Negative for fever.   HENT: Negative for nosebleeds and trouble swallowing.    Eyes: Negative for discharge and visual disturbance.   Respiratory: Negative for apnea and choking.    Cardiovascular: Negative for chest pain and leg swelling.   Gastrointestinal: Negative for diarrhea and vomiting.   Musculoskeletal: Positive for gait problem. Negative for neck stiffness.   Skin: Negative for rash and wound.   Neurological: Positive for dizziness. Negative for facial asymmetry, speech difficulty, weakness, numbness and headaches.   Psychiatric/Behavioral: Negative for confusion and decreased concentration.     Objective:     Vital Signs (Most Recent):  Temp: 98.3 °F (36.8 °C) (20 1322)  Pulse: 70 (20 1322)  Resp: 20 (20 1322)  BP: (!) 154/80 (20 1322)  SpO2: 95 % (20 1322)    Vital Signs Range (Last 24H):  Temp:  [98.3 °F (36.8 °C)-98.9 °F (37.2 °C)]    Pulse:  []   Resp:  [15-20]   BP: (148-189)/()   SpO2:  [90 %-97 %]     Physical Exam  Constitutional:       General: He is not in acute distress.     Appearance: He is not ill-appearing.   HENT:      Head: Normocephalic and atraumatic.   Eyes:      Extraocular Movements: Extraocular movements intact.      Conjunctiva/sclera: Conjunctivae normal.   Cardiovascular:      Rate and Rhythm: Normal rate.   Pulmonary:      Effort: Pulmonary effort is normal. No respiratory distress.   Musculoskeletal: Normal range of motion.         General: No swelling or deformity.   Skin:     General: Skin is warm and dry.   Neurological:      General: No focal deficit present.      Mental Status: He is alert and oriented to person, place, and time.      Motor: No weakness.   Psychiatric:         Mood and Affect: Mood normal.         Behavior: Behavior normal.         Neurological Exam:   LOC: alert  Attention Span: Good   Language: No aphasia  Articulation: No dysarthria  Orientation: Person, Place, Time   Visual Fields: Full  EOM (CN III, IV, VI): Full/intact  Facial Movement (CN VII): Symmetric facial expression    Motor: Arm left  Normal 5/5  Leg left  Normal 5/5  Arm right  Normal 5/5  Leg right Normal 5/5  Sensation: Intact to light touch, temp  Tone: Normal tone throughout      Laboratory:  CMP:   Recent Labs   Lab 12/05/20  0725   CALCIUM 9.8   ALBUMIN 4.3   PROT 7.4      K 4.3   CO2 26      BUN 17   CREATININE 1.1   ALKPHOS 76   ALT 19   AST 17   BILITOT 0.5     CBC:   Recent Labs   Lab 12/05/20  0725   WBC 6.02   RBC 5.70   HGB 15.2   HCT 49.7      MCV 87   MCH 26.7*   MCHC 30.6*     Lipid Panel: No results for input(s): CHOL, LDLCALC, HDL, TRIG in the last 168 hours.  Coagulation: No results for input(s): PT, INR, APTT in the last 168 hours.  Hgb A1C: No results for input(s): HGBA1C in the last 168 hours.  TSH: No results for input(s): TSH in the last 168 hours.      Diagnostic  Results:      Brain imaging:    MRI Brain WO Contrast 12/5/20  1. No acute intracranial findings.  No findings identified to account for the patient's reported symptoms.  2. Anticipated maturation of remote infarct involving the right precentral gyrus and right superior temporal gyrus.  Otherwise no substantial change relative to prior MRI of the brain performed 02/20/2019.      Vessel Imaging:    CTA Head/Neck 12/5/20  1. CT head without definite acute intracranial findings.  2. CTA head neck without evidence of acute large vessel occlusion or flow-limiting stenosis.  Cervical and intracranial atherosclerotic disease and associated vessel narrowing without substantial interval change relative to prior CTA head neck examination, 02/19/2019.  Details as per report body.  3. 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  This may be minimally increased in mediolateral dimension when compared to prior MRA of 02/12/2015.  4. Peripherally enhancing cystic appearing lesion centered at the tongue base extending into the left vallecula.  While this may potentially represent a benign vallecular cyst, the possibility of a mucosal based neoplasm is not excluded.  As such, correlation with direct visualization and possible referral to otolaryngology with consideration of tissue sampling would be recommended to exclude malignancy.  5. Additional details, as per report body.

## 2020-12-05 NOTE — CONSULTS
[facility]  Vascular Neurology  Comprehensive Stroke Center  Consult Note    Consults  Assessment/Plan:     Patient is a 75 y.o. year old male with:    Vertebrobasilar artery insufficiency  75 y.o. man with PMHx prior R MCA stroke (2/19, ESUS; no residual deficits), L ICA stenosis, HTN, DM and HLD presenting with transient episodes of dizziness/vertigo after sitting up in bed and with tilting his head backwards, which both resolve spontaneously with rest and return his head to a resting position, respectively. Examined with staff and Vascular Neurology fellow -- unable to clearly elicit symptoms with positional maneuvers. MRI Brain demonstrated no evidence of acute infarct. CTA Head/Neck re-demonstrated diminutive caliber of the vertebrobasilar system, likely developmental. Pt endorses not drinking water like he should at home lately, with subsequent possibility of dehydration. Orthostatic BPs somewhat soft but marked difference noted in SBP from lying to sitting (173 > 155.)     Discussed with staff and fellow - Suspect pt's presentation to be most consistent with a combination of vertebrobasilar insufficiency (diminutive caliber on CTA + recent dehydration) as well as orthostatic hypotension. We discussed his need to stay very well hydrated, avoid excessive/awkward orientations of his head/neck, and to get up slowly from sitting or lying down. Advised that if symptoms return, he should lie flat for a few moments and allow them to improve, however if symptoms persist, he should return to the ED for assessment. Ok for patient discharge home from a Vascular Neurology standpoint with additional recs as below:    Recommendations (discussed with ED team):  - Increase Lipitor to 80mg Daily. Continue ASA.  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 Please call [phone] with any questions, concerns, or if we can contribute any further value to this patient's care.    Tongue lesion  Incidental finding on review of CTA Head/Neck 12/5/20 -- possible cyst though neoplasm unable to be excluded.  Pt requires further exam and possible tissue sampling, ?ENT evaluation.  Will forward findings to PCP listed in patient's chart.        STROKE DOCUMENTATION          NIH Scale:  1a. Level of Consciousness: 0-->Alert, keenly responsive  1b. LOC Questions: 0-->Answers both questions correctly  1c. LOC Commands: 0-->Performs both tasks correctly  2. Best Gaze: 0-->Normal  3. Visual: 0-->No visual loss  4. Facial Palsy: 0-->Normal symmetrical movements  5a. Motor Arm, Left: 0-->No drift, limb holds 90 (or 45) degrees for full 10 secs  5b. Motor Arm, Right: 0-->No drift, limb holds 90 (or 45) degrees for full 10 secs  6a. Motor Leg, Left: 0-->No drift, leg holds 30 degree position for full 5 secs  6b. Motor Leg, Right: 0-->No drift, leg holds 30 degree position for full 5 secs  7. Limb Ataxia: 0-->Absent  8. Sensory: 0-->Normal, no sensory loss  9. Best Language: 0-->No aphasia, normal  10. Dysarthria: 0-->Normal  11. Extinction and Inattention (formerly Neglect): 0-->No abnormality  Total (NIH Stroke Scale): 0    Modified [place] Score: 1  Wellington Coma Scale:    ABCD2 Score:    [id number] Score:   HAS -BLED Score:   ICH Score:   Hunt & Lozano Classification:       Thrombolysis Candidate? No, Strong suspicion for stroke mimic or alternative diagnosis     Delays to Thrombolysis?  No    Interventional Revascularization Candidate?   Is the patient eligible for mechanical endovascular reperfusion (KEISHA)?  No; No significant neurological deficit      Hemorrhagic change of an Ischemic Stroke: Does this patient have an ischemic stroke with  "hemorrhagic changes? No     Subjective:     History of Present Illness:  Klever Link is a 75 y.o. man with PMHx prior R MCA stroke (, ESUS; no residual deficits), L ICA stenosis, HTN, DM and HLD who is being evaluated by the Vascular Neurology service after developing acute episodes of dizziness/vertigo. Pt states when he woke up yesterday morning and sat at the edge of his bed, he felt like the room was spinning "like a carousel sensation". The episode lasted only about 10 minutes and then spontaneously resolved. Then upon waking morning of presentation, pt experienced the same transient episode. He also reports some dizziness upon tilting his head backwards, which also resolves once he returns to resting position. Pt denies associated extremity weakness or numbness, HA, or changes to his hearing/ringing in his ears at that time. Pt denies other prior similar episodes in the past and denies any recent medication changes. He endorses that he has not been drinking water like he should and very well could have been dehydrated over the last couple days. He endorses compliance with home ASA and statin. States he did take his BP meds this AM.         Past Medical History:   Diagnosis Date    Diabetes mellitus     Expressive aphasia 2015    Hyperlipidemia     Hypertension     Stroke     TIA (transient ischemic attack) 2019     Past Surgical History:   Procedure Laterality Date    KNEE SURGERY Bilateral      Family History   Problem Relation Age of Onset    Diabetes Mother     Ovarian cancer Mother     Hypertension Father     Stroke Father     Diabetes Sister     Diabetes Sister     Diabetes Sister     Stroke Sister      Social History     Tobacco Use    Smoking status: Former Smoker     Packs/day: 1.00     Years: 0.00     Pack years: 0.00     Start date: 1965     Quit date: 2008     Years since quittin.3    Smokeless tobacco: Never Used   Substance Use Topics    Alcohol " use: Not Currently    Drug use: No     Review of patient's allergies indicates:  No Known Allergies    Medications: I have reviewed the current medication administration record.    (Not in a hospital admission)      Review of Systems   Constitutional: Positive for activity change. Negative for fever.   HENT: Negative for nosebleeds and trouble swallowing.    Eyes: Negative for discharge and visual disturbance.   Respiratory: Negative for apnea and choking.    Cardiovascular: Negative for chest pain and leg swelling.   Gastrointestinal: Negative for diarrhea and vomiting.   Musculoskeletal: Positive for gait problem. Negative for neck stiffness.   Skin: Negative for rash and wound.   Neurological: Positive for dizziness. Negative for facial asymmetry, speech difficulty, weakness, numbness and headaches.   Psychiatric/Behavioral: Negative for confusion and decreased concentration.     Objective:     Vital Signs (Most Recent):  Temp: 98.3 °F (36.8 °C) (12/05/20 1322)  Pulse: 70 (12/05/20 1322)  Resp: 20 (12/05/20 1322)  BP: (!) 154/80 (12/05/20 1322)  SpO2: 95 % (12/05/20 1322)    Vital Signs Range (Last 24H):  Temp:  [98.3 °F (36.8 °C)-98.9 °F (37.2 °C)]   Pulse:  []   Resp:  [15-20]   BP: (148-189)/()   SpO2:  [90 %-97 %]     Physical Exam  Constitutional:       General: He is not in acute distress.     Appearance: He is not ill-appearing.   HENT:      Head: Normocephalic and atraumatic.   Eyes:      Extraocular Movements: Extraocular movements intact.      Conjunctiva/sclera: Conjunctivae normal.   Cardiovascular:      Rate and Rhythm: Normal rate.   Pulmonary:      Effort: Pulmonary effort is normal. No respiratory distress.   Musculoskeletal: Normal range of motion.         General: No swelling or deformity.   Skin:     General: Skin is warm and dry.   Neurological:      General: No focal deficit present.      Mental Status: He is alert and oriented to person, place, and time.      Motor: No weakness.    Psychiatric:         Mood and Affect: Mood normal.         Behavior: Behavior normal.         Neurological Exam:   LOC: alert  Attention Span: Good   Language: No aphasia  Articulation: No dysarthria  Orientation: Person, Place, Time   Visual Fields: Full  EOM (CN III, IV, VI): Full/intact  Facial Movement (CN VII): Symmetric facial expression    Motor: Arm left  Normal 5/5  Leg left  Normal 5/5  Arm right  Normal 5/5  Leg right Normal 5/5  Sensation: Intact to light touch, temp  Tone: Normal tone throughout      Laboratory:  CMP:   Recent Labs   Lab 12/05/20  0725   CALCIUM 9.8   ALBUMIN 4.3   PROT 7.4      K 4.3   CO2 26      BUN 17   CREATININE 1.1   ALKPHOS 76   ALT 19   AST 17   BILITOT 0.5     CBC:   Recent Labs   Lab 12/05/20  0725   WBC 6.02   RBC 5.70   HGB 15.2   HCT 49.7      MCV 87   MCH 26.7*   MCHC 30.6*     Lipid Panel: No results for input(s): CHOL, LDLCALC, HDL, TRIG in the last 168 hours.  Coagulation: No results for input(s): PT, INR, APTT in the last 168 hours.  Hgb A1C: No results for input(s): HGBA1C in the last 168 hours.  TSH: No results for input(s): TSH in the last 168 hours.      Diagnostic Results:      Brain imaging:    MRI Brain WO Contrast 12/5/20  1. No acute intracranial findings.  No findings identified to account for the patient's reported symptoms.  2. Anticipated maturation of remote infarct involving the right precentral gyrus and right superior temporal gyrus.  Otherwise no substantial change relative to prior MRI of the brain performed 02/20/2019.      Vessel Imaging:    CTA Head/Neck 12/5/20  1. CT head without definite acute intracranial findings.  2. CTA head neck without evidence of acute large vessel occlusion or flow-limiting stenosis.  Cervical and intracranial atherosclerotic disease and associated vessel narrowing without substantial interval change relative to prior CTA head neck examination, 02/19/2019.  Details as per report body.  3. Saccular  aneurysm measuring up to 3.6 mm at the distal right supraclinoid ICA, possibly at anterior choroidal artery origin.  This may be minimally increased in mediolateral dimension when compared to prior MRA of 02/12/2015.  4. Peripherally enhancing cystic appearing lesion centered at the tongue base extending into the left vallecula.  While this may potentially represent a benign vallecular cyst, the possibility of a mucosal based neoplasm is not excluded.  As such, correlation with direct visualization and possible referral to otolaryngology with consideration of tissue sampling would be recommended to exclude malignancy.  5. Additional details, as per report body.      Davida Graves PA-C  Comprehensive Stroke Center  Department of Vascular Neurology   [facility]

## 2020-12-05 NOTE — ED TRIAGE NOTES
Klever Link, a 75 y.o. male presents to the ED w/ complaint of dizziness. Pt states he woke up and felt dizzy. Pt states he had a similar episode yesterday but it eventually passed after 15 minutes. Pt states he feels less dizzy now but the dizziness gets worse with ambulation. Pt denies any other symptoms     Triage note:  Chief Complaint   Patient presents with    Dizziness     dizziness upon awakening x 2 days, mild nausea, denies cp, sob, lightheaded, fatigue, fever     Review of patient's allergies indicates:  No Known Allergies  Past Medical History:   Diagnosis Date    Diabetes mellitus     Expressive aphasia 2/12/2015    Hyperlipidemia     Hypertension     Stroke     TIA (transient ischemic attack) 02/2019

## 2020-12-05 NOTE — ASSESSMENT & PLAN NOTE
75 y.o. man with PMHx prior R MCA stroke (2/19, ESUS; no residual deficits), L ICA stenosis, HTN, DM and HLD presenting with transient episodes of dizziness/vertigo after sitting up in bed and with tilting his head backwards, which both resolve spontaneously with rest and return his head to a resting position, respectively. Examined with staff and Vascular Neurology fellow -- unable to clearly elicit symptoms with positional maneuvers. MRI Brain demonstrated no evidence of acute infarct. CTA Head/Neck re-demonstrated diminutive caliber of the vertebrobasilar system, likely developmental. Pt endorses not drinking water like he should at home lately, with subsequent possibility of dehydration. Orthostatic BPs somewhat soft but marked difference noted in SBP from lying to sitting (173 > 155.)     Discussed with staff and fellow - Suspect pt's presentation to be most consistent with a combination of vertebrobasilar insufficiency (diminutive caliber on CTA + recent dehydration) as well as orthostatic hypotension. We discussed his need to stay very well hydrated, avoid excessive/awkward orientations of his head/neck, and to get up slowly from sitting or lying down. Advised that if symptoms return, he should lie flat for a few moments and allow them to improve, however if symptoms persist, he should return to the ED for assessment. Ok for patient discharge home from a Vascular Neurology standpoint with additional recs as below:    Recommendations (discussed with ED team):  - Increase Lipitor to 80mg Daily. Continue ASA.  - Ambulatory referral to Vascular Neurology (previous plans for follow-up, possible referral for loop recorder placement)  - Ambulatory referral to Interventional Radiology (incidental 3.6mm saccular aneurysm of the supraclinoid R ICA on CTA)       There are no further recommendations from a Vascular Neurology standpoint; will sign off at this time. Please call [phone] with any questions, concerns, or if we  can contribute any further value to this patient's care.

## 2020-12-05 NOTE — ASSESSMENT & PLAN NOTE
Incidental finding on review of CTA Head/Neck 12/5/20 -- possible cyst though neoplasm unable to be excluded.  Pt requires further exam and possible tissue sampling, ?ENT evaluation.  Will forward findings to PCP listed in patient's chart.

## 2020-12-09 ENCOUNTER — TELEPHONE (OUTPATIENT)
Dept: NEUROLOGY | Facility: CLINIC | Age: 75
End: 2020-12-09

## 2021-01-04 ENCOUNTER — OFFICE VISIT (OUTPATIENT)
Dept: NEUROLOGY | Facility: CLINIC | Age: 76
End: 2021-01-04
Payer: MEDICARE

## 2021-01-04 VITALS
HEART RATE: 99 BPM | BODY MASS INDEX: 23.37 KG/M2 | SYSTOLIC BLOOD PRESSURE: 142 MMHG | WEIGHT: 187 LBS | DIASTOLIC BLOOD PRESSURE: 89 MMHG

## 2021-01-04 DIAGNOSIS — E11.00 TYPE 2 DIABETES MELLITUS WITH HYPEROSMOLARITY WITHOUT COMA, WITHOUT LONG-TERM CURRENT USE OF INSULIN: ICD-10-CM

## 2021-01-04 DIAGNOSIS — K14.8 TONGUE LESION: ICD-10-CM

## 2021-01-04 DIAGNOSIS — I65.22 CAROTID STENOSIS, ASYMPTOMATIC, LEFT: ICD-10-CM

## 2021-01-04 DIAGNOSIS — I67.1 SUPRACLINOID CAROTID ARTERY ANEURYSM, SMALL: ICD-10-CM

## 2021-01-04 DIAGNOSIS — R42 VERTIGO: ICD-10-CM

## 2021-01-04 DIAGNOSIS — Z86.73 HISTORY OF STROKE WITHOUT RESIDUAL DEFICITS: Primary | ICD-10-CM

## 2021-01-04 PROCEDURE — 99213 OFFICE O/P EST LOW 20 MIN: CPT | Mod: PBBFAC | Performed by: PSYCHIATRY & NEUROLOGY

## 2021-01-04 PROCEDURE — 99999 PR PBB SHADOW E&M-EST. PATIENT-LVL III: ICD-10-PCS | Mod: PBBFAC,,, | Performed by: PSYCHIATRY & NEUROLOGY

## 2021-01-04 PROCEDURE — 99214 PR OFFICE/OUTPT VISIT, EST, LEVL IV, 30-39 MIN: ICD-10-PCS | Mod: S$PBB,,, | Performed by: PSYCHIATRY & NEUROLOGY

## 2021-01-04 PROCEDURE — 99999 PR PBB SHADOW E&M-EST. PATIENT-LVL III: CPT | Mod: PBBFAC,,, | Performed by: PSYCHIATRY & NEUROLOGY

## 2021-01-04 PROCEDURE — 99214 OFFICE O/P EST MOD 30 MIN: CPT | Mod: S$PBB,,, | Performed by: PSYCHIATRY & NEUROLOGY

## 2021-01-21 ENCOUNTER — OFFICE VISIT (OUTPATIENT)
Dept: PODIATRY | Facility: CLINIC | Age: 76
End: 2021-01-21
Payer: MEDICARE

## 2021-01-21 VITALS
HEIGHT: 76 IN | WEIGHT: 191.81 LBS | BODY MASS INDEX: 23.36 KG/M2 | RESPIRATION RATE: 18 BRPM | HEART RATE: 83 BPM | DIASTOLIC BLOOD PRESSURE: 80 MMHG | SYSTOLIC BLOOD PRESSURE: 128 MMHG

## 2021-01-21 DIAGNOSIS — E11.49 TYPE II DIABETES MELLITUS WITH NEUROLOGICAL MANIFESTATIONS: Primary | ICD-10-CM

## 2021-01-21 DIAGNOSIS — B35.1 ONYCHOMYCOSIS DUE TO DERMATOPHYTE: ICD-10-CM

## 2021-01-21 PROCEDURE — 99213 OFFICE O/P EST LOW 20 MIN: CPT | Mod: PBBFAC,25 | Performed by: PODIATRIST

## 2021-01-21 PROCEDURE — 99499 UNLISTED E&M SERVICE: CPT | Mod: S$PBB,,, | Performed by: PODIATRIST

## 2021-01-21 PROCEDURE — 11721 PR DEBRIDEMENT OF NAILS, 6 OR MORE: ICD-10-PCS | Mod: Q9,S$PBB,, | Performed by: PODIATRIST

## 2021-01-21 PROCEDURE — 99999 PR PBB SHADOW E&M-EST. PATIENT-LVL III: ICD-10-PCS | Mod: PBBFAC,,, | Performed by: PODIATRIST

## 2021-01-21 PROCEDURE — 99999 PR PBB SHADOW E&M-EST. PATIENT-LVL III: CPT | Mod: PBBFAC,,, | Performed by: PODIATRIST

## 2021-01-21 PROCEDURE — 11721 DEBRIDE NAIL 6 OR MORE: CPT | Mod: Q9,S$PBB,, | Performed by: PODIATRIST

## 2021-01-21 PROCEDURE — 11721 DEBRIDE NAIL 6 OR MORE: CPT | Mod: Q9,PBBFAC | Performed by: PODIATRIST

## 2021-01-21 PROCEDURE — 99499 NO LOS: ICD-10-PCS | Mod: S$PBB,,, | Performed by: PODIATRIST

## 2021-01-21 RX ORDER — METFORMIN HYDROCHLORIDE 500 MG/1
500 TABLET ORAL
COMMUNITY
Start: 2020-03-09 | End: 2022-02-22 | Stop reason: SDUPTHER

## 2021-01-21 RX ORDER — ATORVASTATIN CALCIUM 80 MG/1
80 TABLET, FILM COATED ORAL
COMMUNITY
Start: 2020-12-05 | End: 2021-03-18 | Stop reason: SDUPTHER

## 2021-01-21 RX ORDER — LISINOPRIL 20 MG/1
20 TABLET ORAL
COMMUNITY
Start: 2020-03-09 | End: 2021-04-20 | Stop reason: SDUPTHER

## 2021-01-21 RX ORDER — ASPIRIN 81 MG/1
81 TABLET ORAL
COMMUNITY
Start: 2020-09-29 | End: 2021-04-20 | Stop reason: SDUPTHER

## 2021-01-22 ENCOUNTER — PATIENT MESSAGE (OUTPATIENT)
Dept: ADMINISTRATIVE | Facility: OTHER | Age: 76
End: 2021-01-22

## 2021-01-26 ENCOUNTER — OFFICE VISIT (OUTPATIENT)
Dept: OTOLARYNGOLOGY | Facility: CLINIC | Age: 76
End: 2021-01-26
Payer: MEDICARE

## 2021-01-26 VITALS
HEART RATE: 76 BPM | WEIGHT: 190.38 LBS | SYSTOLIC BLOOD PRESSURE: 149 MMHG | DIASTOLIC BLOOD PRESSURE: 72 MMHG | TEMPERATURE: 98 F | BODY MASS INDEX: 23.18 KG/M2 | HEIGHT: 76 IN

## 2021-01-26 DIAGNOSIS — R93.89 ABNORMAL CT SCAN, NECK: Primary | ICD-10-CM

## 2021-01-26 DIAGNOSIS — H93.13 TINNITUS OF BOTH EARS: ICD-10-CM

## 2021-01-26 DIAGNOSIS — Z01.812 PRE-PROCEDURE LAB EXAM: ICD-10-CM

## 2021-01-26 DIAGNOSIS — K14.8 TONGUE LESION: ICD-10-CM

## 2021-01-26 DIAGNOSIS — R09.89 PHLEGM IN THROAT: ICD-10-CM

## 2021-01-26 DIAGNOSIS — R49.0 HOARSENESS: ICD-10-CM

## 2021-01-26 DIAGNOSIS — H93.293 ABNORMAL AUDITORY PERCEPTION OF BOTH EARS: ICD-10-CM

## 2021-01-26 DIAGNOSIS — R13.10 DYSPHAGIA, UNSPECIFIED TYPE: ICD-10-CM

## 2021-01-26 PROCEDURE — 99204 PR OFFICE/OUTPT VISIT, NEW, LEVL IV, 45-59 MIN: ICD-10-PCS | Mod: S$PBB,,, | Performed by: SPECIALIST

## 2021-01-26 PROCEDURE — 99204 OFFICE O/P NEW MOD 45 MIN: CPT | Mod: S$PBB,,, | Performed by: SPECIALIST

## 2021-01-26 PROCEDURE — 99999 PR PBB SHADOW E&M-EST. PATIENT-LVL V: CPT | Mod: PBBFAC,,, | Performed by: SPECIALIST

## 2021-01-26 PROCEDURE — 99215 OFFICE O/P EST HI 40 MIN: CPT | Mod: PBBFAC,PN | Performed by: SPECIALIST

## 2021-01-26 PROCEDURE — 99999 PR PBB SHADOW E&M-EST. PATIENT-LVL V: ICD-10-PCS | Mod: PBBFAC,,, | Performed by: SPECIALIST

## 2021-01-28 ENCOUNTER — CLINICAL SUPPORT (OUTPATIENT)
Dept: OTOLARYNGOLOGY | Facility: CLINIC | Age: 76
End: 2021-01-28
Payer: MEDICARE

## 2021-01-28 DIAGNOSIS — H90.3 BILATERAL SENSORINEURAL HEARING LOSS: Primary | ICD-10-CM

## 2021-01-28 DIAGNOSIS — R29.2 ABNORMAL ACOUSTIC REFLEX: ICD-10-CM

## 2021-01-28 DIAGNOSIS — R94.120 ABNORMALLY COMPLIANT RIGHT MIDDLE EAR SYSTEM WITH TYPE AD TYMPANOGRAM CURVE: ICD-10-CM

## 2021-01-28 DIAGNOSIS — Z77.122 HISTORY OF EXPOSURE TO NOISE: ICD-10-CM

## 2021-01-28 DIAGNOSIS — H93.13 BILATERAL TINNITUS: ICD-10-CM

## 2021-01-28 PROCEDURE — 92550 PR TYMPANOMETRY AND REFLEX THRESHOLD MEASUREMENTS: ICD-10-PCS | Mod: S$GLB,,, | Performed by: AUDIOLOGIST-HEARING AID FITTER

## 2021-01-28 PROCEDURE — 92557 PR COMPREHENSIVE HEARING TEST: ICD-10-PCS | Mod: S$GLB,,, | Performed by: AUDIOLOGIST-HEARING AID FITTER

## 2021-01-28 PROCEDURE — 92550 TYMPANOMETRY & REFLEX THRESH: CPT | Mod: S$GLB,,, | Performed by: AUDIOLOGIST-HEARING AID FITTER

## 2021-01-28 PROCEDURE — 92557 COMPREHENSIVE HEARING TEST: CPT | Mod: S$GLB,,, | Performed by: AUDIOLOGIST-HEARING AID FITTER

## 2021-01-29 ENCOUNTER — TELEPHONE (OUTPATIENT)
Dept: OTOLARYNGOLOGY | Facility: CLINIC | Age: 76
End: 2021-01-29

## 2021-02-01 ENCOUNTER — LAB VISIT (OUTPATIENT)
Dept: PRIMARY CARE CLINIC | Facility: CLINIC | Age: 76
End: 2021-02-01
Payer: MEDICARE

## 2021-02-01 DIAGNOSIS — Z01.812 PRE-PROCEDURE LAB EXAM: ICD-10-CM

## 2021-02-01 PROCEDURE — U0003 INFECTIOUS AGENT DETECTION BY NUCLEIC ACID (DNA OR RNA); SEVERE ACUTE RESPIRATORY SYNDROME CORONAVIRUS 2 (SARS-COV-2) (CORONAVIRUS DISEASE [COVID-19]), AMPLIFIED PROBE TECHNIQUE, MAKING USE OF HIGH THROUGHPUT TECHNOLOGIES AS DESCRIBED BY CMS-2020-01-R: HCPCS

## 2021-02-02 ENCOUNTER — TELEPHONE (OUTPATIENT)
Dept: OTOLARYNGOLOGY | Facility: CLINIC | Age: 76
End: 2021-02-02

## 2021-02-02 LAB — SARS-COV-2 RNA RESP QL NAA+PROBE: NOT DETECTED

## 2021-02-04 ENCOUNTER — OFFICE VISIT (OUTPATIENT)
Dept: OTOLARYNGOLOGY | Facility: CLINIC | Age: 76
End: 2021-02-04
Payer: MEDICARE

## 2021-02-04 VITALS
TEMPERATURE: 98 F | SYSTOLIC BLOOD PRESSURE: 137 MMHG | HEART RATE: 81 BPM | DIASTOLIC BLOOD PRESSURE: 79 MMHG | WEIGHT: 188.38 LBS | HEIGHT: 76 IN | BODY MASS INDEX: 22.94 KG/M2

## 2021-02-04 DIAGNOSIS — H93.13 TINNITUS OF BOTH EARS: ICD-10-CM

## 2021-02-04 DIAGNOSIS — J34.2 NASAL SEPTAL DEVIATION: ICD-10-CM

## 2021-02-04 DIAGNOSIS — R49.0 HOARSENESS: ICD-10-CM

## 2021-02-04 DIAGNOSIS — R09.89 PHLEGM IN THROAT: ICD-10-CM

## 2021-02-04 DIAGNOSIS — R13.10 DYSPHAGIA, UNSPECIFIED TYPE: ICD-10-CM

## 2021-02-04 DIAGNOSIS — H90.3 SENSORINEURAL HEARING LOSS (SNHL) OF BOTH EARS: ICD-10-CM

## 2021-02-04 DIAGNOSIS — R93.89 ABNORMAL CT SCAN, NECK: Primary | ICD-10-CM

## 2021-02-04 DIAGNOSIS — K14.8 TONGUE LESION: ICD-10-CM

## 2021-02-04 DIAGNOSIS — K21.9 LARYNGOPHARYNGEAL REFLUX (LPR): ICD-10-CM

## 2021-02-04 PROCEDURE — 99999 PR PBB SHADOW E&M-EST. PATIENT-LVL V: CPT | Mod: PBBFAC,,, | Performed by: SPECIALIST

## 2021-02-04 PROCEDURE — 99215 OFFICE O/P EST HI 40 MIN: CPT | Mod: PBBFAC,PN,25 | Performed by: SPECIALIST

## 2021-02-04 PROCEDURE — 99214 OFFICE O/P EST MOD 30 MIN: CPT | Mod: 25,S$PBB,, | Performed by: SPECIALIST

## 2021-02-04 PROCEDURE — 31575 DIAGNOSTIC LARYNGOSCOPY: CPT | Mod: PBBFAC,PN | Performed by: SPECIALIST

## 2021-02-04 PROCEDURE — 99214 PR OFFICE/OUTPT VISIT, EST, LEVL IV, 30-39 MIN: ICD-10-PCS | Mod: 25,S$PBB,, | Performed by: SPECIALIST

## 2021-02-04 PROCEDURE — 31575 LARYNGOSCOPY: ICD-10-PCS | Mod: S$PBB,,, | Performed by: SPECIALIST

## 2021-02-04 PROCEDURE — 99999 PR PBB SHADOW E&M-EST. PATIENT-LVL V: ICD-10-PCS | Mod: PBBFAC,,, | Performed by: SPECIALIST

## 2021-02-04 RX ORDER — PANTOPRAZOLE SODIUM 40 MG/1
TABLET, DELAYED RELEASE ORAL
Qty: 60 TABLET | Refills: 11 | Status: SHIPPED | OUTPATIENT
Start: 2021-02-04 | End: 2021-07-13 | Stop reason: ALTCHOICE

## 2021-03-18 ENCOUNTER — OFFICE VISIT (OUTPATIENT)
Dept: OTOLARYNGOLOGY | Facility: CLINIC | Age: 76
End: 2021-03-18
Payer: MEDICARE

## 2021-03-18 VITALS
DIASTOLIC BLOOD PRESSURE: 81 MMHG | BODY MASS INDEX: 23.08 KG/M2 | HEART RATE: 74 BPM | SYSTOLIC BLOOD PRESSURE: 160 MMHG | TEMPERATURE: 97 F | WEIGHT: 189.63 LBS

## 2021-03-18 DIAGNOSIS — H93.13 TINNITUS OF BOTH EARS: ICD-10-CM

## 2021-03-18 DIAGNOSIS — R49.0 HOARSENESS: ICD-10-CM

## 2021-03-18 DIAGNOSIS — H90.3 SENSORINEURAL HEARING LOSS (SNHL) OF BOTH EARS: ICD-10-CM

## 2021-03-18 DIAGNOSIS — R13.10 DYSPHAGIA, UNSPECIFIED TYPE: Primary | ICD-10-CM

## 2021-03-18 DIAGNOSIS — K21.9 LARYNGOPHARYNGEAL REFLUX (LPR): ICD-10-CM

## 2021-03-18 DIAGNOSIS — H61.23 BILATERAL IMPACTED CERUMEN: ICD-10-CM

## 2021-03-18 PROCEDURE — 99213 PR OFFICE/OUTPT VISIT, EST, LEVL III, 20-29 MIN: ICD-10-PCS | Mod: 25,S$PBB,, | Performed by: SPECIALIST

## 2021-03-18 PROCEDURE — 99999 PR PBB SHADOW E&M-EST. PATIENT-LVL III: CPT | Mod: PBBFAC,,, | Performed by: SPECIALIST

## 2021-03-18 PROCEDURE — 99999 PR PBB SHADOW E&M-EST. PATIENT-LVL III: ICD-10-PCS | Mod: PBBFAC,,, | Performed by: SPECIALIST

## 2021-03-18 PROCEDURE — 99213 OFFICE O/P EST LOW 20 MIN: CPT | Mod: PBBFAC,PN,25 | Performed by: SPECIALIST

## 2021-03-18 PROCEDURE — 99213 OFFICE O/P EST LOW 20 MIN: CPT | Mod: 25,S$PBB,, | Performed by: SPECIALIST

## 2021-03-18 PROCEDURE — 69210 REMOVE IMPACTED EAR WAX UNI: CPT | Mod: 50,PBBFAC,PN | Performed by: SPECIALIST

## 2021-03-18 PROCEDURE — 69210 EAR CERUMEN REMOVAL: ICD-10-PCS | Mod: S$PBB,,, | Performed by: SPECIALIST

## 2021-04-19 ENCOUNTER — OFFICE VISIT (OUTPATIENT)
Dept: OTOLARYNGOLOGY | Facility: CLINIC | Age: 76
End: 2021-04-19
Payer: MEDICARE

## 2021-04-19 VITALS
HEART RATE: 88 BPM | DIASTOLIC BLOOD PRESSURE: 64 MMHG | BODY MASS INDEX: 22.88 KG/M2 | SYSTOLIC BLOOD PRESSURE: 144 MMHG | WEIGHT: 187.94 LBS | TEMPERATURE: 97 F

## 2021-04-19 DIAGNOSIS — H61.23 BILATERAL IMPACTED CERUMEN: ICD-10-CM

## 2021-04-19 DIAGNOSIS — H90.3 SENSORINEURAL HEARING LOSS (SNHL) OF BOTH EARS: Primary | ICD-10-CM

## 2021-04-19 DIAGNOSIS — J34.2 NASAL SEPTAL DEVIATION: ICD-10-CM

## 2021-04-19 DIAGNOSIS — R13.10 DYSPHAGIA, UNSPECIFIED TYPE: ICD-10-CM

## 2021-04-19 DIAGNOSIS — K21.9 LARYNGOPHARYNGEAL REFLUX (LPR): ICD-10-CM

## 2021-04-19 DIAGNOSIS — R49.0 HOARSENESS: ICD-10-CM

## 2021-04-19 DIAGNOSIS — H93.13 TINNITUS OF BOTH EARS: ICD-10-CM

## 2021-04-19 PROCEDURE — 99999 PR PBB SHADOW E&M-EST. PATIENT-LVL III: CPT | Mod: PBBFAC,,, | Performed by: SPECIALIST

## 2021-04-19 PROCEDURE — 99999 PR PBB SHADOW E&M-EST. PATIENT-LVL III: ICD-10-PCS | Mod: PBBFAC,,, | Performed by: SPECIALIST

## 2021-04-19 PROCEDURE — 99213 OFFICE O/P EST LOW 20 MIN: CPT | Mod: PBBFAC,PN,25 | Performed by: SPECIALIST

## 2021-04-19 PROCEDURE — 99213 PR OFFICE/OUTPT VISIT, EST, LEVL III, 20-29 MIN: ICD-10-PCS | Mod: 25,S$PBB,, | Performed by: SPECIALIST

## 2021-04-19 PROCEDURE — 69210 EAR CERUMEN REMOVAL: ICD-10-PCS | Mod: S$PBB,,, | Performed by: SPECIALIST

## 2021-04-19 PROCEDURE — 99213 OFFICE O/P EST LOW 20 MIN: CPT | Mod: 25,S$PBB,, | Performed by: SPECIALIST

## 2021-04-19 PROCEDURE — 69210 REMOVE IMPACTED EAR WAX UNI: CPT | Mod: 50,PBBFAC,PN | Performed by: SPECIALIST

## 2021-04-20 ENCOUNTER — OFFICE VISIT (OUTPATIENT)
Dept: CARDIOLOGY | Facility: CLINIC | Age: 76
End: 2021-04-20
Payer: MEDICARE

## 2021-04-20 VITALS
SYSTOLIC BLOOD PRESSURE: 136 MMHG | HEIGHT: 76 IN | HEART RATE: 90 BPM | BODY MASS INDEX: 22.94 KG/M2 | WEIGHT: 188.38 LBS | DIASTOLIC BLOOD PRESSURE: 79 MMHG

## 2021-04-20 DIAGNOSIS — E78.2 MIXED HYPERLIPIDEMIA: ICD-10-CM

## 2021-04-20 DIAGNOSIS — I10 ESSENTIAL HYPERTENSION: Primary | ICD-10-CM

## 2021-04-20 DIAGNOSIS — I49.3 PVCS (PREMATURE VENTRICULAR CONTRACTIONS): ICD-10-CM

## 2021-04-20 DIAGNOSIS — I65.22 CAROTID STENOSIS, ASYMPTOMATIC, LEFT: ICD-10-CM

## 2021-04-20 DIAGNOSIS — Z86.73 HISTORY OF STROKE WITHOUT RESIDUAL DEFICITS: ICD-10-CM

## 2021-04-20 PROCEDURE — 93010 EKG 12-LEAD: ICD-10-PCS | Mod: S$PBB,,, | Performed by: INTERNAL MEDICINE

## 2021-04-20 PROCEDURE — 93005 ELECTROCARDIOGRAM TRACING: CPT | Mod: PBBFAC,PO | Performed by: INTERNAL MEDICINE

## 2021-04-20 PROCEDURE — 93010 ELECTROCARDIOGRAM REPORT: CPT | Mod: S$PBB,,, | Performed by: INTERNAL MEDICINE

## 2021-04-20 PROCEDURE — 99213 PR OFFICE/OUTPT VISIT, EST, LEVL III, 20-29 MIN: ICD-10-PCS | Mod: S$PBB,25,, | Performed by: INTERNAL MEDICINE

## 2021-04-20 PROCEDURE — 99213 OFFICE O/P EST LOW 20 MIN: CPT | Mod: S$PBB,25,, | Performed by: INTERNAL MEDICINE

## 2021-04-20 PROCEDURE — 99999 PR PBB SHADOW E&M-EST. PATIENT-LVL III: ICD-10-PCS | Mod: PBBFAC,,, | Performed by: INTERNAL MEDICINE

## 2021-04-20 PROCEDURE — 99213 OFFICE O/P EST LOW 20 MIN: CPT | Mod: PBBFAC,PO | Performed by: INTERNAL MEDICINE

## 2021-04-20 PROCEDURE — 99999 PR PBB SHADOW E&M-EST. PATIENT-LVL III: CPT | Mod: PBBFAC,,, | Performed by: INTERNAL MEDICINE

## 2021-04-22 ENCOUNTER — OFFICE VISIT (OUTPATIENT)
Dept: PODIATRY | Facility: CLINIC | Age: 76
End: 2021-04-22
Payer: MEDICARE

## 2021-04-22 VITALS
HEART RATE: 75 BPM | HEIGHT: 76 IN | BODY MASS INDEX: 23.36 KG/M2 | RESPIRATION RATE: 18 BRPM | SYSTOLIC BLOOD PRESSURE: 150 MMHG | DIASTOLIC BLOOD PRESSURE: 68 MMHG | WEIGHT: 191.81 LBS

## 2021-04-22 DIAGNOSIS — B35.1 ONYCHOMYCOSIS DUE TO DERMATOPHYTE: ICD-10-CM

## 2021-04-22 DIAGNOSIS — E11.49 TYPE II DIABETES MELLITUS WITH NEUROLOGICAL MANIFESTATIONS: Primary | ICD-10-CM

## 2021-04-22 PROCEDURE — 11721 DEBRIDE NAIL 6 OR MORE: CPT | Mod: Q9,PBBFAC | Performed by: PODIATRIST

## 2021-04-22 PROCEDURE — 99499 UNLISTED E&M SERVICE: CPT | Mod: S$PBB,,, | Performed by: PODIATRIST

## 2021-04-22 PROCEDURE — 99999 PR PBB SHADOW E&M-EST. PATIENT-LVL III: CPT | Mod: PBBFAC,,, | Performed by: PODIATRIST

## 2021-04-22 PROCEDURE — 11721 DEBRIDE NAIL 6 OR MORE: CPT | Mod: Q9,S$PBB,, | Performed by: PODIATRIST

## 2021-04-22 PROCEDURE — 11721 PR DEBRIDEMENT OF NAILS, 6 OR MORE: ICD-10-PCS | Mod: Q9,S$PBB,, | Performed by: PODIATRIST

## 2021-04-22 PROCEDURE — 99999 PR PBB SHADOW E&M-EST. PATIENT-LVL III: ICD-10-PCS | Mod: PBBFAC,,, | Performed by: PODIATRIST

## 2021-04-22 PROCEDURE — 99499 NO LOS: ICD-10-PCS | Mod: S$PBB,,, | Performed by: PODIATRIST

## 2021-04-22 PROCEDURE — 99213 OFFICE O/P EST LOW 20 MIN: CPT | Mod: PBBFAC | Performed by: PODIATRIST

## 2021-06-01 ENCOUNTER — OFFICE VISIT (OUTPATIENT)
Dept: OTOLARYNGOLOGY | Facility: CLINIC | Age: 76
End: 2021-06-01
Payer: MEDICARE

## 2021-06-01 VITALS
TEMPERATURE: 97 F | HEART RATE: 79 BPM | WEIGHT: 187.94 LBS | SYSTOLIC BLOOD PRESSURE: 154 MMHG | DIASTOLIC BLOOD PRESSURE: 73 MMHG | BODY MASS INDEX: 22.88 KG/M2

## 2021-06-01 DIAGNOSIS — H93.13 TINNITUS OF BOTH EARS: ICD-10-CM

## 2021-06-01 DIAGNOSIS — D10.1: ICD-10-CM

## 2021-06-01 DIAGNOSIS — R13.10 DYSPHAGIA, UNSPECIFIED TYPE: ICD-10-CM

## 2021-06-01 DIAGNOSIS — J34.2 NASAL SEPTAL DEVIATION: ICD-10-CM

## 2021-06-01 DIAGNOSIS — H90.3 BILATERAL SENSORINEURAL HEARING LOSS: ICD-10-CM

## 2021-06-01 DIAGNOSIS — R93.89 ABNORMAL CT SCAN, NECK: ICD-10-CM

## 2021-06-01 DIAGNOSIS — R09.89 PHLEGM IN THROAT: ICD-10-CM

## 2021-06-01 DIAGNOSIS — K21.9 LARYNGOPHARYNGEAL REFLUX (LPR): Primary | ICD-10-CM

## 2021-06-01 PROCEDURE — 31575 DIAGNOSTIC LARYNGOSCOPY: CPT | Mod: PBBFAC,PN | Performed by: SPECIALIST

## 2021-06-01 PROCEDURE — 99214 OFFICE O/P EST MOD 30 MIN: CPT | Mod: 25,S$PBB,, | Performed by: SPECIALIST

## 2021-06-01 PROCEDURE — 99999 PR PBB SHADOW E&M-EST. PATIENT-LVL III: CPT | Mod: PBBFAC,,, | Performed by: SPECIALIST

## 2021-06-01 PROCEDURE — 99999 PR PBB SHADOW E&M-EST. PATIENT-LVL III: ICD-10-PCS | Mod: PBBFAC,,, | Performed by: SPECIALIST

## 2021-06-01 PROCEDURE — 99213 OFFICE O/P EST LOW 20 MIN: CPT | Mod: PBBFAC,PN | Performed by: SPECIALIST

## 2021-06-01 PROCEDURE — 31575 LARYNGOSCOPY: ICD-10-PCS | Mod: S$PBB,,, | Performed by: SPECIALIST

## 2021-06-01 PROCEDURE — 99214 PR OFFICE/OUTPT VISIT, EST, LEVL IV, 30-39 MIN: ICD-10-PCS | Mod: 25,S$PBB,, | Performed by: SPECIALIST

## 2021-06-01 RX ORDER — FAMOTIDINE 20 MG/1
20 TABLET, FILM COATED ORAL 2 TIMES DAILY
Qty: 60 TABLET | Refills: 11 | Status: SHIPPED | OUTPATIENT
Start: 2021-06-01 | End: 2021-07-13 | Stop reason: SDUPTHER

## 2021-07-13 ENCOUNTER — OFFICE VISIT (OUTPATIENT)
Dept: OTOLARYNGOLOGY | Facility: CLINIC | Age: 76
End: 2021-07-13
Payer: MEDICARE

## 2021-07-13 VITALS
TEMPERATURE: 97 F | DIASTOLIC BLOOD PRESSURE: 73 MMHG | HEART RATE: 75 BPM | BODY MASS INDEX: 22.84 KG/M2 | WEIGHT: 187.63 LBS | SYSTOLIC BLOOD PRESSURE: 157 MMHG

## 2021-07-13 DIAGNOSIS — H90.3 BILATERAL SENSORINEURAL HEARING LOSS: ICD-10-CM

## 2021-07-13 DIAGNOSIS — R13.10 DYSPHAGIA, UNSPECIFIED TYPE: Primary | ICD-10-CM

## 2021-07-13 DIAGNOSIS — H93.13 TINNITUS OF BOTH EARS: ICD-10-CM

## 2021-07-13 DIAGNOSIS — K21.9 LARYNGOPHARYNGEAL REFLUX (LPR): ICD-10-CM

## 2021-07-13 DIAGNOSIS — Z97.4 HEARING AID WORN: ICD-10-CM

## 2021-07-13 PROCEDURE — 99213 OFFICE O/P EST LOW 20 MIN: CPT | Mod: PBBFAC,PN | Performed by: SPECIALIST

## 2021-07-13 PROCEDURE — 99999 PR PBB SHADOW E&M-EST. PATIENT-LVL III: CPT | Mod: PBBFAC,,, | Performed by: SPECIALIST

## 2021-07-13 PROCEDURE — 99213 PR OFFICE/OUTPT VISIT, EST, LEVL III, 20-29 MIN: ICD-10-PCS | Mod: S$PBB,,, | Performed by: SPECIALIST

## 2021-07-13 PROCEDURE — 99213 OFFICE O/P EST LOW 20 MIN: CPT | Mod: S$PBB,,, | Performed by: SPECIALIST

## 2021-07-13 PROCEDURE — 99999 PR PBB SHADOW E&M-EST. PATIENT-LVL III: ICD-10-PCS | Mod: PBBFAC,,, | Performed by: SPECIALIST

## 2021-07-13 RX ORDER — FAMOTIDINE 20 MG/1
20 TABLET, FILM COATED ORAL 2 TIMES DAILY
Qty: 180 TABLET | Refills: 3 | Status: SHIPPED | OUTPATIENT
Start: 2021-07-13 | End: 2022-10-17

## 2021-07-27 ENCOUNTER — OFFICE VISIT (OUTPATIENT)
Dept: PODIATRY | Facility: CLINIC | Age: 76
End: 2021-07-27
Payer: MEDICARE

## 2021-07-27 VITALS
SYSTOLIC BLOOD PRESSURE: 160 MMHG | WEIGHT: 189.63 LBS | DIASTOLIC BLOOD PRESSURE: 83 MMHG | HEART RATE: 72 BPM | HEIGHT: 76 IN | BODY MASS INDEX: 23.09 KG/M2

## 2021-07-27 DIAGNOSIS — E11.49 TYPE II DIABETES MELLITUS WITH NEUROLOGICAL MANIFESTATIONS: Primary | ICD-10-CM

## 2021-07-27 DIAGNOSIS — B35.3 TINEA PEDIS OF BOTH FEET: ICD-10-CM

## 2021-07-27 DIAGNOSIS — B35.1 ONYCHOMYCOSIS DUE TO DERMATOPHYTE: ICD-10-CM

## 2021-07-27 PROCEDURE — 99213 PR OFFICE/OUTPT VISIT, EST, LEVL III, 20-29 MIN: ICD-10-PCS | Mod: 25,S$PBB,, | Performed by: PODIATRIST

## 2021-07-27 PROCEDURE — 99999 PR PBB SHADOW E&M-EST. PATIENT-LVL III: ICD-10-PCS | Mod: PBBFAC,,, | Performed by: PODIATRIST

## 2021-07-27 PROCEDURE — 99213 OFFICE O/P EST LOW 20 MIN: CPT | Mod: PBBFAC | Performed by: PODIATRIST

## 2021-07-27 PROCEDURE — 11721 PR DEBRIDEMENT OF NAILS, 6 OR MORE: ICD-10-PCS | Mod: Q9,S$PBB,, | Performed by: PODIATRIST

## 2021-07-27 PROCEDURE — 99213 OFFICE O/P EST LOW 20 MIN: CPT | Mod: 25,S$PBB,, | Performed by: PODIATRIST

## 2021-07-27 PROCEDURE — 99999 PR PBB SHADOW E&M-EST. PATIENT-LVL III: CPT | Mod: PBBFAC,,, | Performed by: PODIATRIST

## 2021-07-27 PROCEDURE — 11721 DEBRIDE NAIL 6 OR MORE: CPT | Mod: Q9,S$PBB,, | Performed by: PODIATRIST

## 2021-07-27 PROCEDURE — 11721 DEBRIDE NAIL 6 OR MORE: CPT | Mod: Q9,PBBFAC | Performed by: PODIATRIST

## 2021-07-27 RX ORDER — KETOCONAZOLE 20 MG/G
CREAM TOPICAL DAILY
Qty: 60 G | Refills: 3 | Status: SHIPPED | OUTPATIENT
Start: 2021-07-27 | End: 2021-08-04 | Stop reason: SDUPTHER

## 2021-08-04 RX ORDER — KETOCONAZOLE 20 MG/G
CREAM TOPICAL DAILY
Qty: 60 G | Refills: 3 | Status: SHIPPED | OUTPATIENT
Start: 2021-08-04 | End: 2021-08-04 | Stop reason: SDUPTHER

## 2021-08-04 RX ORDER — KETOCONAZOLE 20 MG/G
CREAM TOPICAL DAILY
Qty: 60 G | Refills: 3 | Status: SHIPPED | OUTPATIENT
Start: 2021-08-04 | End: 2022-10-17

## 2021-08-23 ENCOUNTER — TELEPHONE (OUTPATIENT)
Dept: OTOLARYNGOLOGY | Facility: CLINIC | Age: 76
End: 2021-08-23

## 2021-08-23 DIAGNOSIS — Z01.818 PRE-OP TESTING: ICD-10-CM

## 2021-09-02 ENCOUNTER — PATIENT MESSAGE (OUTPATIENT)
Dept: NEUROLOGY | Facility: CLINIC | Age: 76
End: 2021-09-02

## 2021-09-28 ENCOUNTER — IMMUNIZATION (OUTPATIENT)
Dept: INTERNAL MEDICINE | Facility: CLINIC | Age: 76
End: 2021-09-28
Payer: MEDICARE

## 2021-09-28 DIAGNOSIS — Z23 NEED FOR VACCINATION: Primary | ICD-10-CM

## 2021-09-28 PROCEDURE — 91300 COVID-19, MRNA, LNP-S, PF, 30 MCG/0.3 ML DOSE VACCINE: CPT | Mod: PBBFAC

## 2021-09-28 PROCEDURE — 0003A COVID-19, MRNA, LNP-S, PF, 30 MCG/0.3 ML DOSE VACCINE: CPT | Mod: PBBFAC

## 2021-09-29 ENCOUNTER — TELEPHONE (OUTPATIENT)
Dept: OTOLARYNGOLOGY | Facility: CLINIC | Age: 76
End: 2021-09-29

## 2021-09-29 DIAGNOSIS — Z01.818 PRE-OP TESTING: ICD-10-CM

## 2021-10-01 ENCOUNTER — LAB VISIT (OUTPATIENT)
Dept: SPORTS MEDICINE | Facility: CLINIC | Age: 76
End: 2021-10-01
Payer: MEDICARE

## 2021-10-01 DIAGNOSIS — Z01.818 PRE-OP TESTING: ICD-10-CM

## 2021-10-01 LAB
SARS-COV-2 RNA RESP QL NAA+PROBE: NOT DETECTED
SARS-COV-2- CYCLE NUMBER: NORMAL

## 2021-10-01 PROCEDURE — U0005 INFEC AGEN DETEC AMPLI PROBE: HCPCS | Performed by: SPECIALIST

## 2021-10-01 PROCEDURE — U0003 INFECTIOUS AGENT DETECTION BY NUCLEIC ACID (DNA OR RNA); SEVERE ACUTE RESPIRATORY SYNDROME CORONAVIRUS 2 (SARS-COV-2) (CORONAVIRUS DISEASE [COVID-19]), AMPLIFIED PROBE TECHNIQUE, MAKING USE OF HIGH THROUGHPUT TECHNOLOGIES AS DESCRIBED BY CMS-2020-01-R: HCPCS | Performed by: SPECIALIST

## 2021-10-04 ENCOUNTER — TELEPHONE (OUTPATIENT)
Dept: OTOLARYNGOLOGY | Facility: CLINIC | Age: 76
End: 2021-10-04

## 2021-10-05 ENCOUNTER — OFFICE VISIT (OUTPATIENT)
Dept: OTOLARYNGOLOGY | Facility: CLINIC | Age: 76
End: 2021-10-05
Payer: MEDICARE

## 2021-10-05 ENCOUNTER — TELEPHONE (OUTPATIENT)
Dept: OTOLARYNGOLOGY | Facility: CLINIC | Age: 76
End: 2021-10-05

## 2021-10-05 VITALS
TEMPERATURE: 98 F | WEIGHT: 185.63 LBS | HEART RATE: 71 BPM | DIASTOLIC BLOOD PRESSURE: 81 MMHG | SYSTOLIC BLOOD PRESSURE: 180 MMHG | BODY MASS INDEX: 22.6 KG/M2

## 2021-10-05 DIAGNOSIS — H93.13 TINNITUS OF BOTH EARS: ICD-10-CM

## 2021-10-05 DIAGNOSIS — J34.2 NASAL SEPTAL DEVIATION: ICD-10-CM

## 2021-10-05 DIAGNOSIS — K21.9 LARYNGOPHARYNGEAL REFLUX (LPR): ICD-10-CM

## 2021-10-05 DIAGNOSIS — H90.3 BILATERAL SENSORINEURAL HEARING LOSS: ICD-10-CM

## 2021-10-05 DIAGNOSIS — J30.9 ALLERGIC RHINITIS, UNSPECIFIED SEASONALITY, UNSPECIFIED TRIGGER: ICD-10-CM

## 2021-10-05 DIAGNOSIS — D10.1: ICD-10-CM

## 2021-10-05 DIAGNOSIS — R13.10 DYSPHAGIA, UNSPECIFIED TYPE: Primary | ICD-10-CM

## 2021-10-05 DIAGNOSIS — Z97.4 HEARING AID WORN: ICD-10-CM

## 2021-10-05 DIAGNOSIS — R09.89 PHLEGM IN THROAT: ICD-10-CM

## 2021-10-05 PROCEDURE — 99999 PR PBB SHADOW E&M-EST. PATIENT-LVL III: ICD-10-PCS | Mod: PBBFAC,,, | Performed by: SPECIALIST

## 2021-10-05 PROCEDURE — 99213 OFFICE O/P EST LOW 20 MIN: CPT | Mod: PBBFAC,PN | Performed by: SPECIALIST

## 2021-10-05 PROCEDURE — 31575 LARYNGOSCOPY: ICD-10-PCS | Mod: S$PBB,,, | Performed by: SPECIALIST

## 2021-10-05 PROCEDURE — 31575 DIAGNOSTIC LARYNGOSCOPY: CPT | Mod: PBBFAC,PN | Performed by: SPECIALIST

## 2021-10-05 PROCEDURE — 99999 PR PBB SHADOW E&M-EST. PATIENT-LVL III: CPT | Mod: PBBFAC,,, | Performed by: SPECIALIST

## 2021-10-05 PROCEDURE — 99214 OFFICE O/P EST MOD 30 MIN: CPT | Mod: 25,S$PBB,, | Performed by: SPECIALIST

## 2021-10-05 PROCEDURE — 99214 PR OFFICE/OUTPT VISIT, EST, LEVL IV, 30-39 MIN: ICD-10-PCS | Mod: 25,S$PBB,, | Performed by: SPECIALIST

## 2021-11-16 ENCOUNTER — OFFICE VISIT (OUTPATIENT)
Dept: CARDIOLOGY | Facility: CLINIC | Age: 76
End: 2021-11-16
Payer: MEDICARE

## 2021-11-16 VITALS
OXYGEN SATURATION: 100 % | HEIGHT: 76 IN | BODY MASS INDEX: 22.66 KG/M2 | SYSTOLIC BLOOD PRESSURE: 138 MMHG | DIASTOLIC BLOOD PRESSURE: 78 MMHG | WEIGHT: 186.06 LBS | HEART RATE: 81 BPM

## 2021-11-16 DIAGNOSIS — E78.1 HYPERTRIGLYCERIDEMIA: ICD-10-CM

## 2021-11-16 DIAGNOSIS — R42 VERTIGO: ICD-10-CM

## 2021-11-16 DIAGNOSIS — I65.22 CAROTID STENOSIS, ASYMPTOMATIC, LEFT: ICD-10-CM

## 2021-11-16 DIAGNOSIS — H90.3 BILATERAL SENSORINEURAL HEARING LOSS: ICD-10-CM

## 2021-11-16 DIAGNOSIS — I10 PRIMARY HYPERTENSION: Primary | ICD-10-CM

## 2021-11-16 DIAGNOSIS — Z86.73 HISTORY OF STROKE WITHOUT RESIDUAL DEFICITS: ICD-10-CM

## 2021-11-16 DIAGNOSIS — I49.3 PVCS (PREMATURE VENTRICULAR CONTRACTIONS): ICD-10-CM

## 2021-11-16 DIAGNOSIS — E78.2 MIXED HYPERLIPIDEMIA: ICD-10-CM

## 2021-11-16 PROCEDURE — 99999 PR PBB SHADOW E&M-EST. PATIENT-LVL III: ICD-10-PCS | Mod: PBBFAC,,, | Performed by: INTERNAL MEDICINE

## 2021-11-16 PROCEDURE — 93005 ELECTROCARDIOGRAM TRACING: CPT | Mod: PBBFAC,PO | Performed by: INTERNAL MEDICINE

## 2021-11-16 PROCEDURE — 99999 PR PBB SHADOW E&M-EST. PATIENT-LVL III: CPT | Mod: PBBFAC,,, | Performed by: INTERNAL MEDICINE

## 2021-11-16 PROCEDURE — 93010 EKG 12-LEAD: ICD-10-PCS | Mod: S$PBB,,, | Performed by: INTERNAL MEDICINE

## 2021-11-16 PROCEDURE — 99213 OFFICE O/P EST LOW 20 MIN: CPT | Mod: PBBFAC,PO | Performed by: INTERNAL MEDICINE

## 2021-11-16 PROCEDURE — 99213 OFFICE O/P EST LOW 20 MIN: CPT | Mod: S$PBB,25,, | Performed by: INTERNAL MEDICINE

## 2021-11-16 PROCEDURE — 99213 PR OFFICE/OUTPT VISIT, EST, LEVL III, 20-29 MIN: ICD-10-PCS | Mod: S$PBB,25,, | Performed by: INTERNAL MEDICINE

## 2021-11-16 PROCEDURE — 93010 ELECTROCARDIOGRAM REPORT: CPT | Mod: S$PBB,,, | Performed by: INTERNAL MEDICINE

## 2021-12-08 ENCOUNTER — HOSPITAL ENCOUNTER (OUTPATIENT)
Dept: RADIOLOGY | Facility: OTHER | Age: 76
Discharge: HOME | End: 2021-12-08
Attending: INTERNAL MEDICINE
Payer: MEDICARE

## 2021-12-08 DIAGNOSIS — I65.29 STENOSIS OF CAROTID ARTERY, UNSPECIFIED LATERALITY: ICD-10-CM

## 2021-12-08 PROCEDURE — 93880 US CAROTID BILATERAL: ICD-10-PCS | Mod: 26,,, | Performed by: RADIOLOGY

## 2021-12-08 PROCEDURE — 93880 EXTRACRANIAL BILAT STUDY: CPT | Mod: 26,,, | Performed by: RADIOLOGY

## 2021-12-08 PROCEDURE — 93880 EXTRACRANIAL BILAT STUDY: CPT | Mod: TC

## 2022-01-04 ENCOUNTER — OFFICE VISIT (OUTPATIENT)
Dept: OTOLARYNGOLOGY | Facility: CLINIC | Age: 77
End: 2022-01-04
Payer: MEDICARE

## 2022-01-04 VITALS
BODY MASS INDEX: 23.09 KG/M2 | SYSTOLIC BLOOD PRESSURE: 173 MMHG | TEMPERATURE: 98 F | WEIGHT: 189.69 LBS | DIASTOLIC BLOOD PRESSURE: 81 MMHG | HEART RATE: 75 BPM

## 2022-01-04 DIAGNOSIS — R13.10 DYSPHAGIA, UNSPECIFIED TYPE: Primary | ICD-10-CM

## 2022-01-04 DIAGNOSIS — K21.9 LARYNGOPHARYNGEAL REFLUX (LPR): ICD-10-CM

## 2022-01-04 DIAGNOSIS — J34.2 NASAL SEPTAL DEVIATION: ICD-10-CM

## 2022-01-04 DIAGNOSIS — H81.11 BENIGN PAROXYSMAL POSITIONAL VERTIGO OF RIGHT EAR: ICD-10-CM

## 2022-01-04 DIAGNOSIS — R93.89 ABNORMAL CT SCAN, NECK: ICD-10-CM

## 2022-01-04 DIAGNOSIS — H61.23 BILATERAL IMPACTED CERUMEN: ICD-10-CM

## 2022-01-04 DIAGNOSIS — Z97.4 HEARING AID WORN: ICD-10-CM

## 2022-01-04 DIAGNOSIS — H90.3 BILATERAL SENSORINEURAL HEARING LOSS: ICD-10-CM

## 2022-01-04 DIAGNOSIS — H93.13 TINNITUS OF BOTH EARS: ICD-10-CM

## 2022-01-04 DIAGNOSIS — R09.89 PHLEGM IN THROAT: ICD-10-CM

## 2022-01-04 DIAGNOSIS — J30.9 ALLERGIC RHINITIS, UNSPECIFIED SEASONALITY, UNSPECIFIED TRIGGER: ICD-10-CM

## 2022-01-04 PROCEDURE — 99214 PR OFFICE/OUTPT VISIT, EST, LEVL IV, 30-39 MIN: ICD-10-PCS | Mod: 25,S$PBB,, | Performed by: SPECIALIST

## 2022-01-04 PROCEDURE — 95992 CANALITH REPOSITIONING PROC: CPT | Mod: PBBFAC,PN | Performed by: SPECIALIST

## 2022-01-04 PROCEDURE — 99214 OFFICE O/P EST MOD 30 MIN: CPT | Mod: 25,S$PBB,, | Performed by: SPECIALIST

## 2022-01-04 PROCEDURE — 95992 CANALITH REPOSITIONING PROC: CPT | Mod: S$PBB,,, | Performed by: SPECIALIST

## 2022-01-04 PROCEDURE — 99999 PR PBB SHADOW E&M-EST. PATIENT-LVL III: ICD-10-PCS | Mod: PBBFAC,,, | Performed by: SPECIALIST

## 2022-01-04 PROCEDURE — 69210 REMOVE IMPACTED EAR WAX UNI: CPT | Mod: 50,PBBFAC,PN | Performed by: SPECIALIST

## 2022-01-04 PROCEDURE — 95992 PR CANALITH REPOSITIONING PROCEDURE, PER DAY: ICD-10-PCS | Mod: S$PBB,,, | Performed by: SPECIALIST

## 2022-01-04 PROCEDURE — 99213 OFFICE O/P EST LOW 20 MIN: CPT | Mod: PBBFAC,PN | Performed by: SPECIALIST

## 2022-01-04 PROCEDURE — 99999 PR PBB SHADOW E&M-EST. PATIENT-LVL III: CPT | Mod: PBBFAC,,, | Performed by: SPECIALIST

## 2022-01-04 PROCEDURE — 69210 EAR CERUMEN REMOVAL: ICD-10-PCS | Mod: S$PBB,,, | Performed by: SPECIALIST

## 2022-01-04 NOTE — PROGRESS NOTES
Subjective:       Patient ID: Klever Link is a 76 y.o. male.    Chief Complaint: Follow-up (With Ear check)     Patient is returning to discuss issues:    1. Laryngopharyngeal reflux:  The patient has been taking  famotidine 20 mg twice daily.  He has not noticed significant issues with phlegm in the throat or throat clearing.  He is not having problems with swallowing.  He has had no detrimental changes since switching from a PPI to famotidine  2.  Hearing loss:   He  wears bilateral hearing aids dispensed by the [facility] .  He is very pleased with the results.    3.  Tinnitus:  His tinnitus diminish is almost to the point of being a noticeable when he is wearing his hearing aids.  When he removes them there is no change in the ringing.  4.  Abnormal CT angiogram:  The patient had an enhancing mass in the left base of tongue on CT done in December 2020.  He is having no otalgia or swallowing problems.  5. Allergic rhinitis:  He is having some nasal discharge of white mucus intermittently, particularly on the right side.  He uses OTC medications to control symptoms  6. New problem:  Dizziness-for the last 4-6 weeks the patient has had brief episodes of vertigo when he extends his neck to put in his eyedrops for glaucoma.  He does not have issues when he lays in bed or turns from side to side.      Review of Systems     Constitutional: Negative for appetite change, chills, fatigue, fever and unexpected weight loss.      HENT: Positive for hearing loss, postnasal drip, ringing in the ears, runny nose, trouble swallowing and voice change.      Eyes:  Negative for change in eyesight, eye drainage, eye itching and photophobia.     Respiratory:  Negative for cough, shortness of breath, sleep apnea, snoring and wheezing.      Cardiovascular:  Negative for chest pain, foot swelling, irregular heartbeat and swollen veins.     Gastrointestinal:  Negative for abdominal pain, acid reflux, constipation,  diarrhea, heartburn and vomiting.     Genitourinary: Negative for difficulty urinating, sexual problems and frequent urination.     Musc: Negative for aching joints, aching muscles, back pain and neck pain.     Skin: Negative for rash.     Allergy: Positive for seasonal allergies. Negative for food allergies.     Endocrine: Negative for cold intolerance and heat intolerance.      Neurological: Negative for dizziness, headaches, light-headedness, seizures and tremors.      Hematologic: Negative for bruises/bleeds easily and swollen glands.      Psychiatric: Negative for decreased concentration, depression, nervous/anxious and sleep disturbance.                Objective:      Physical Exam  Vitals and nursing note reviewed.   Constitutional:       General: He is awake.      Appearance: Normal appearance. He is well-developed, well-groomed and normal weight.   HENT:      Head: Normocephalic.      Salivary Glands: Right salivary gland is not diffusely enlarged. Left salivary gland is not diffusely enlarged.      Right Ear: Ear canal and external ear normal. Decreased hearing, partial cerumen impaction noted. Tympanic membrane is retracted.      Left Ear: Ear canal and external ear normal. Decreased hearing, partial cerumen impaction noted. Tympanic membrane is retracted.      Nose: Septal deviation (To the left) and rhinorrhea present. No nasal deformity or mucosal edema. Rhinorrhea is clear.      Right Turbinates: Enlarged and pale.      Left Turbinates: Enlarged and pale.      Comments:       Mouth/Throat:      Lips: Pink. No lesions.      Mouth: Mucous membranes are moist. No oral lesions.      Dentition: Has dentures ( full upper and lower dentures). No gum lesions.      Tongue: No lesions ( manual palpation of the tongue reveals no tenderness, mass or induration of the base of the tongue).      Palate: No mass and lesions.      Pharynx: Oropharynx is clear. Uvula midline. No oropharyngeal exudate.      Comments:  Bimanual examination-no palpable masses or tenderness in the base of the tongue, no palpable cervical adenopathy                         [place]-Hallpike maneuver:  Positive for BPPV on the right  Eyes:      General: Lids are normal.         Right eye: No discharge.         Left eye: No discharge.      Conjunctiva/sclera: Conjunctivae normal.      Right eye: Right conjunctiva is not injected.      Left eye: Left conjunctiva is not injected.      Pupils: Pupils are equal, round, and reactive to light.   Neck:      Thyroid: No thyroid mass or thyromegaly.      Trachea: Trachea normal. No tracheal deviation.   Cardiovascular:      Rate and Rhythm: Normal rate and regular rhythm.      Pulses: Normal pulses.      Heart sounds: Normal heart sounds. No murmur heard.   No gallop.    Pulmonary:      Effort: Pulmonary effort is normal.      Breath sounds: Normal breath sounds. No decreased breath sounds, wheezing, rhonchi or rales.   Abdominal:      General: Bowel sounds are normal.      Palpations: Abdomen is soft.      Tenderness: There is no abdominal tenderness.   Musculoskeletal:      Right shoulder: Normal.      Cervical back: Neck supple. Decreased range of motion.   Lymphadenopathy:      Head:      Right side of head: No submental, submandibular or occipital adenopathy.      Left side of head: No submental, submandibular or occipital adenopathy.      Cervical: No cervical adenopathy.   Skin:     General: Skin is warm and dry.      Findings: No rash.      Nails: There is no clubbing.   Neurological:      Mental Status: He is alert and oriented to person, place, and time.      Cranial Nerves: No cranial nerve deficit.      Sensory: No sensory deficit.      Gait: Gait normal.   Psychiatric:         Speech: Speech normal.         Behavior: Behavior normal. Behavior is cooperative.     Procedure-cerumen impactions removed from both ears using a wire loop.  The patient tolerated procedure well.    Epley Procedure  Preop  diagnosis:  Benign paroxysmal positional  vertigo, right ear  Postop diagnosis:  Benign paroxysmal positional  vertigo, right ear  Procedure:  An Epley maneuver was performed  on the  right ear in the clinic by me.  The patient tolerated procedure well was discharged post procedure.    Assessment:       1. Dysphagia, unspecified type    2. Phlegm in throat    3. Laryngopharyngeal reflux (LPR)    4. Bilateral sensorineural hearing loss    5. Hearing aid worn    6. Tinnitus of both ears    7. Allergic rhinitis, unspecified seasonality, unspecified trigger    8. Nasal septal deviation    9. Abnormal CT scan, neck    10. Benign paroxysmal positional vertigo of right ear    11. Bilateral impacted cerumen        Plan:       I    have instructed patient to minimize head movement keep his neck is stiff.  He is to minimize stooping bending.  I explained the pathophysiology of benign paroxysmal positional vertigo to the patient.  I will recheck week.

## 2022-01-04 NOTE — PATIENT INSTRUCTIONS
"Patient Education       Vertigo (a Type of Dizziness)   The Basics   Written by the doctors and editors at [facility]   What are dizziness and vertigo? -- Dizziness is a feeling that is sometimes hard to describe. It often makes you feel like you are about to fall or pass out. Dizziness can also cause you to feel lightheaded or make it hard for you to walk straight.  Vertigo is a type of dizziness that makes you feel like you are spinning, swaying, or tilting, or like the room is moving around you. These feelings come and go, and might last seconds, hours, or days. You might feel worse when you move your head, change positions, cough, or sneeze.  Some people with vertigo have trouble walking. Some people with vertigo have nausea and might vomit.  What causes vertigo? -- The most common causes of vertigo include:  · Inner ear problems - Deep inside the ear, there is a small network of tubes that are filled with fluid. Floating inside that fluid are special calcium deposits. Together, these tubes and deposits make up the "vestibular system." This system tells the brain what position the body is in. It also helps keep you balanced (figure 1).  Problems that affect the inner ear and can lead to vertigo include:  ? Benign paroxysmal positional vertigo - In this condition, extra calcium deposits form in the inner ear. This can lead to short episodes of vertigo that happen when you move your head in certain ways.  ? Meniere disease - This is a condition in which fluid builds up inside the inner ear. This causes vertigo as well as hearing loss and ringing in one or both ears.   ? Vestibular neuritis - This is sometimes caused by a virus which can affect the inner ear or the nerve in the inner ear. It is sometimes called "labyrinthitis." People with this condition have vertigo that comes on quickly and can last several days. They also often feel very sick and off balance.  ? Head injury - Even a minor head injury can cause " inner ear damage and vertigo. This is usually temporary.  ? Vestibular migraine - People who get migraines, which are a type of headache, can sometimes have episodes of vertigo. This can happen with or without a headache.  · Other problems - Other things that can cause vertigo include:  ? Certain medicines  ? Problems that affect the brain, such as stroke or multiple sclerosis  Should I see a doctor or nurse? -- See your doctor or nurse right away if you have vertigo and:  · Have a new or severe headache  · Have a fever higher than 100.4ºF (38ºC)  · Start to see double or have trouble seeing clearly  · Have trouble speaking or hearing  · Have weakness in an arm or leg or your face droops to one side  · Cannot walk on your own  · Pass out  · Have numbness or tingling  · Have chest pain  · Cannot stop vomiting  You should also see your doctor or nurse if you have vertigo that lasts for several minutes or more and you:  · Are older than 60  · Had a stroke in the past  · Are at risk for having a stroke, for example because you have diabetes or you smoke  If you have dizziness or vertigo that comes and goes but you do not have any of the problems listed above, you should still make an appointment with your doctor or nurse.  Will I need tests? -- Maybe. Your doctor will start by learning about your symptoms and doing an exam. During the exam, he or she will check:  · Your hearing  · How you walk and keep your balance  · How your eyes work when you watch a moving object, and when your head is turned from side to side  Depending on what your doctor finds during the exam, he or she might order more tests to better understand your hearing or balance problems. In some cases, the doctor will order an MRI of your brain. An MRI is an imaging test that creates pictures of the inside of your body.  How is vertigo treated? -- If your doctor knows what is causing your vertigo, he or she will probably try to treat that problem  "directly. For instance, if you have calcium deposits in your inner ear, the doctor might try to get them out by moving your head in a specific way.  Your doctor can also give you medicines that might help your vertigo and relieve nausea and vomiting.  If your vertigo is really bad, your doctor might also suggest a treatment called "balance rehabilitation." This treatment teaches you exercises that can help you cope with your vertigo.  What can I do on my own to deal with my vertigo? -- If you have trouble standing or walking because of vertigo, you are at risk of falling. To reduce the risk of falls, make your home as safe as possible. Get rid of loose electrical cords, clutter, and slippery rugs. Also, make sure that you wear sturdy, non-slip shoes, and that your walkways are clear and well lit.  All topics are updated as new evidence becomes available and our peer review process is complete.  This topic retrieved from [facility] in [place] on: Sep 21, 2021.  Topic 22845 Version 5.0  Release: 29.4.2 - C29.263  © 2021 UpToDate, Inc. and/or its affiliates. All rights reserved.  figure 1: The vestibular system     Deep inside the ear there is a small network of tubes that are filled with fluid, called the "semicircular canals." Also deep inside the ear are special calcium deposits, called "otolith organs." Together these tubes and deposits make up the "vestibular system." This system tells the brain what position the head is in and how it is moving. The vestibular system helps keep you balanced, especially when you are standing or walking.  Graphic 38899 Version 10.0    Consumer Information Use and Disclaimer   This information is not specific medical advice and does not replace information you receive from your health care provider. This is only a brief summary of general information. It does NOT include all information about conditions, illnesses, injuries, tests, procedures, treatments, therapies, discharge instructions or " life-style choices that may apply to you. You must talk with your health care provider for complete information about your health and treatment options. This information should not be used to decide whether or not to accept your health care provider's advice, instructions or recommendations. Only your health care provider has the knowledge and training to provide advice that is right for you. The use of this information is governed by the [employer] End User License Agreement, available at https://www.hubbuzz.com/en/solutions/[employer]/about/sara.The use of [employer] content is governed by the [employer] Terms of Use. ©2021 [employer] All rights reserved.  Copyright   © 2021 [employer] and/or its affiliates. All rights reserved.

## 2022-01-04 NOTE — PROCEDURES
"Ear Cerumen Removal    Date/Time: 1/4/2022 8:00 AM  Performed by: EMI Dahl MD  Authorized by: EMI Dahl MD     Time out: Immediately prior to procedure a "time out" was called to verify the correct patient, procedure, equipment, support staff and site/side marked as required.    Consent Done?:  Yes (Verbal)    Local anesthetic:  None  Location details:  Both ears  Procedure type comment:  Wire loop  Cerumen  Removal Results:  Cerumen completely removed  Patient tolerance:  Patient tolerated the procedure well with no immediate complications      "

## 2022-01-11 ENCOUNTER — OFFICE VISIT (OUTPATIENT)
Dept: OTOLARYNGOLOGY | Facility: CLINIC | Age: 77
End: 2022-01-11
Payer: MEDICARE

## 2022-01-11 VITALS
BODY MASS INDEX: 23 KG/M2 | WEIGHT: 188.94 LBS | HEART RATE: 71 BPM | DIASTOLIC BLOOD PRESSURE: 82 MMHG | TEMPERATURE: 97 F | SYSTOLIC BLOOD PRESSURE: 189 MMHG

## 2022-01-11 DIAGNOSIS — Z01.818 PRE-OP TESTING: ICD-10-CM

## 2022-01-11 DIAGNOSIS — J30.9 ALLERGIC RHINITIS, UNSPECIFIED SEASONALITY, UNSPECIFIED TRIGGER: ICD-10-CM

## 2022-01-11 DIAGNOSIS — H81.11 BPPV (BENIGN PAROXYSMAL POSITIONAL VERTIGO), RIGHT: Primary | ICD-10-CM

## 2022-01-11 DIAGNOSIS — K21.9 LARYNGOPHARYNGEAL REFLUX (LPR): ICD-10-CM

## 2022-01-11 DIAGNOSIS — R13.10 DYSPHAGIA, UNSPECIFIED TYPE: ICD-10-CM

## 2022-01-11 DIAGNOSIS — R09.89 PHLEGM IN THROAT: ICD-10-CM

## 2022-01-11 DIAGNOSIS — H90.3 SENSORINEURAL HEARING LOSS (SNHL) OF BOTH EARS: ICD-10-CM

## 2022-01-11 DIAGNOSIS — J34.2 NASAL SEPTAL DEVIATION: ICD-10-CM

## 2022-01-11 DIAGNOSIS — Z97.4 HEARING AID WORN: ICD-10-CM

## 2022-01-11 PROCEDURE — 99213 OFFICE O/P EST LOW 20 MIN: CPT | Mod: PBBFAC,PN | Performed by: SPECIALIST

## 2022-01-11 PROCEDURE — 99999 PR PBB SHADOW E&M-EST. PATIENT-LVL III: ICD-10-PCS | Mod: PBBFAC,,, | Performed by: SPECIALIST

## 2022-01-11 PROCEDURE — 99999 PR PBB SHADOW E&M-EST. PATIENT-LVL III: CPT | Mod: PBBFAC,,, | Performed by: SPECIALIST

## 2022-01-11 PROCEDURE — 99213 PR OFFICE/OUTPT VISIT, EST, LEVL III, 20-29 MIN: ICD-10-PCS | Mod: S$PBB,,, | Performed by: SPECIALIST

## 2022-01-11 PROCEDURE — 99213 OFFICE O/P EST LOW 20 MIN: CPT | Mod: S$PBB,,, | Performed by: SPECIALIST

## 2022-01-11 NOTE — PROGRESS NOTES
Subjective:       Patient ID: Klever Link is a 76 y.o. male.    Chief Complaint: Follow-up     Patient is returning to discuss issues:    1. Laryngopharyngeal reflux:  The patient has been taking  famotidine 20 mg twice daily.   Throat symptoms are minimal.    2.  Hearing loss:   He  wears bilateral hearing aids dispensed by the [facility] .  He is very pleased with the results.    3.  Tinnitus:  His tinnitus diminish is almost to the point of being a noticeable when he is wearing his hearing aids.  When he removes them there is no change in the ringing.  4.  Abnormal CT angiogram:  The patient had an enhancing mass in the left base of tongue on CT done in December 2020.  He is having no otalgia or swallowing problems.  5. Allergic rhinitis:  He is having some nasal discharge of white mucus intermittently, particularly on the right side.  He uses OTC medications to control symptoms  6. Benign paroxysmal positional vertigo, right:  The patient has had no episodes of imbalance since undergoing Epley maneuver here last week.    Review of Systems     Constitutional: Negative for appetite change, chills, fatigue, fever and unexpected weight loss.      HENT: Positive for hearing loss, postnasal drip, ringing in the ears, runny nose, trouble swallowing and voice change.      Eyes:  Negative for change in eyesight, eye drainage, eye itching and photophobia.     Respiratory:  Negative for cough, shortness of breath, sleep apnea, snoring and wheezing.      Cardiovascular:  Negative for chest pain, foot swelling, irregular heartbeat and swollen veins.     Gastrointestinal:  Negative for abdominal pain, acid reflux, constipation, diarrhea, heartburn and vomiting.     Genitourinary: Negative for difficulty urinating, sexual problems and frequent urination.     Musc: Negative for aching joints, aching muscles, back pain and neck pain.     Skin: Negative for rash.     Allergy: Positive for seasonal allergies.  Negative for food allergies.     Endocrine: Negative for cold intolerance and heat intolerance.      Neurological:  Positive for dizziness.  Negative for headaches, light-headedness, seizures and tremors.      Hematologic: Negative for bruises/bleeds easily and swollen glands.      Psychiatric: Negative for decreased concentration, depression, nervous/anxious and sleep disturbance.                Objective:      Physical Exam  Vitals and nursing note reviewed.   Constitutional:       General: He is awake.      Appearance: Normal appearance. He is well-developed, well-groomed and normal weight.   HENT:      Head: Normocephalic.      Salivary Glands: Right salivary gland is not diffusely enlarged. Left salivary gland is not diffusely enlarged.      Right Ear: Ear canal and external ear normal. Decreased hearing, partial cerumen impaction noted. Tympanic membrane is retracted.      Left Ear: Ear canal and external ear normal. Decreased hearing, partial cerumen impaction noted. Tympanic membrane is retracted.      Nose: Septal deviation (To the left) and rhinorrhea present. No nasal deformity or mucosal edema. Rhinorrhea is clear.      Right Turbinates: Enlarged and pale.      Left Turbinates: Enlarged and pale.      Comments:       Mouth/Throat:      Lips: Pink. No lesions.      Mouth: Mucous membranes are moist. No oral lesions.      Dentition: Has dentures ( full upper and lower dentures). No gum lesions.      Tongue: No lesions ( manual palpation of the tongue reveals no tenderness, mass or induration of the base of the tongue).      Palate: No mass and lesions.      Pharynx: Oropharynx is clear. Uvula midline. No oropharyngeal exudate.      Comments: Bimanual examination-no palpable masses or tenderness in the base of the tongue, no palpable cervical adenopathy                         [place]-Hallpike maneuver:  Positive for BPPV on the right  Eyes:      General: Lids are normal.         Right eye: No discharge.          Left eye: No discharge.      Conjunctiva/sclera: Conjunctivae normal.      Right eye: Right conjunctiva is not injected.      Left eye: Left conjunctiva is not injected.      Pupils: Pupils are equal, round, and reactive to light.   Neck:      Thyroid: No thyroid mass or thyromegaly.      Trachea: Trachea normal. No tracheal deviation.   Cardiovascular:      Rate and Rhythm: Normal rate and regular rhythm.      Pulses: Normal pulses.      Heart sounds: Normal heart sounds. No murmur heard.   No gallop.    Pulmonary:      Effort: Pulmonary effort is normal.      Breath sounds: Normal breath sounds. No decreased breath sounds, wheezing, rhonchi or rales.   Abdominal:      General: Bowel sounds are normal.      Palpations: Abdomen is soft.      Tenderness: There is no abdominal tenderness.   Musculoskeletal:      Right shoulder: Normal.      Cervical back: Neck supple. Decreased range of motion.   Lymphadenopathy:      Head:      Right side of head: No submental, submandibular or occipital adenopathy.      Left side of head: No submental, submandibular or occipital adenopathy.      Cervical: No cervical adenopathy.   Skin:     General: Skin is warm and dry.      Findings: No rash.      Nails: There is no clubbing.   Neurological:      Mental Status: He is alert and oriented to person, place, and time.      Cranial Nerves: No cranial nerve deficit.      Sensory: No sensory deficit.      Gait: Gait normal.   Psychiatric:         Speech: Speech normal.         Behavior: Behavior normal. Behavior is cooperative.       Assessment:       1. BPPV (benign paroxysmal positional vertigo), right    2. Laryngopharyngeal reflux (LPR)    3. Phlegm in throat    4. Dysphagia, unspecified type    5. Allergic rhinitis, unspecified seasonality, unspecified trigger    6. Nasal septal deviation    7. Sensorineural hearing loss (SNHL) of both ears    8. Hearing aid worn        Plan:       I giving the patient a home Epley maneuver to  perform in the event his symptoms recur her.  He will continue with his famotidine.  I will recheck him in 4 weeks.  At that visit he will need undergo flexible nasolaryngoscopy, so he will need COVID-19 testing done 3 days prior to the visit.    Answers for HPI/ROS submitted by the patient on 1/4/2022  None of these: Yes  None of these : Yes  None of these: Yes  None of these : Yes  None of these: Yes  None of these: Yes  None of these: Yes  None of these : Yes  None of these: Yes  None of these : Yes  dizziness: Yes  None of these: Yes  None of these: Yes

## 2022-02-07 ENCOUNTER — HOSPITAL ENCOUNTER (OUTPATIENT)
Dept: PREADMISSION TESTING | Facility: OTHER | Age: 77
Discharge: HOME | End: 2022-02-07
Attending: ANESTHESIOLOGY
Payer: MEDICARE

## 2022-02-07 DIAGNOSIS — Z01.818 PRE-OP TESTING: ICD-10-CM

## 2022-02-07 LAB
SARS-COV-2 RNA RESP QL NAA+PROBE: NOT DETECTED
SARS-COV-2- CYCLE NUMBER: NORMAL

## 2022-02-07 PROCEDURE — U0005 INFEC AGEN DETEC AMPLI PROBE: HCPCS | Performed by: SPECIALIST

## 2022-02-07 PROCEDURE — U0003 INFECTIOUS AGENT DETECTION BY NUCLEIC ACID (DNA OR RNA); SEVERE ACUTE RESPIRATORY SYNDROME CORONAVIRUS 2 (SARS-COV-2) (CORONAVIRUS DISEASE [COVID-19]), AMPLIFIED PROBE TECHNIQUE, MAKING USE OF HIGH THROUGHPUT TECHNOLOGIES AS DESCRIBED BY CMS-2020-01-R: HCPCS | Performed by: SPECIALIST

## 2022-02-10 ENCOUNTER — OFFICE VISIT (OUTPATIENT)
Dept: OTOLARYNGOLOGY | Facility: CLINIC | Age: 77
End: 2022-02-10
Payer: MEDICARE

## 2022-02-10 VITALS
BODY MASS INDEX: 23 KG/M2 | DIASTOLIC BLOOD PRESSURE: 85 MMHG | WEIGHT: 188.94 LBS | SYSTOLIC BLOOD PRESSURE: 165 MMHG | TEMPERATURE: 97 F | HEART RATE: 79 BPM

## 2022-02-10 DIAGNOSIS — R13.10 DYSPHAGIA, UNSPECIFIED TYPE: ICD-10-CM

## 2022-02-10 DIAGNOSIS — Z97.4 HEARING AID WORN: ICD-10-CM

## 2022-02-10 DIAGNOSIS — H90.3 SENSORINEURAL HEARING LOSS (SNHL) OF BOTH EARS: ICD-10-CM

## 2022-02-10 DIAGNOSIS — J34.2 NASAL SEPTAL DEVIATION: ICD-10-CM

## 2022-02-10 DIAGNOSIS — H61.23 BILATERAL IMPACTED CERUMEN: Primary | ICD-10-CM

## 2022-02-10 DIAGNOSIS — H81.11 BPPV (BENIGN PAROXYSMAL POSITIONAL VERTIGO), RIGHT: ICD-10-CM

## 2022-02-10 DIAGNOSIS — K21.9 LARYNGOPHARYNGEAL REFLUX (LPR): ICD-10-CM

## 2022-02-10 DIAGNOSIS — J30.9 ALLERGIC RHINITIS, UNSPECIFIED SEASONALITY, UNSPECIFIED TRIGGER: ICD-10-CM

## 2022-02-10 PROCEDURE — 99999 PR PBB SHADOW E&M-EST. PATIENT-LVL III: CPT | Mod: PBBFAC,,, | Performed by: SPECIALIST

## 2022-02-10 PROCEDURE — 99213 OFFICE O/P EST LOW 20 MIN: CPT | Mod: PBBFAC,PN,25 | Performed by: SPECIALIST

## 2022-02-10 PROCEDURE — 31575 LARYNGOSCOPY: ICD-10-PCS | Mod: S$PBB,,, | Performed by: SPECIALIST

## 2022-02-10 PROCEDURE — 69210 REMOVE IMPACTED EAR WAX UNI: CPT | Mod: 50,PBBFAC,PN | Performed by: SPECIALIST

## 2022-02-10 PROCEDURE — 99214 OFFICE O/P EST MOD 30 MIN: CPT | Mod: 25,S$PBB,, | Performed by: SPECIALIST

## 2022-02-10 PROCEDURE — 99214 PR OFFICE/OUTPT VISIT, EST, LEVL IV, 30-39 MIN: ICD-10-PCS | Mod: 25,S$PBB,, | Performed by: SPECIALIST

## 2022-02-10 PROCEDURE — 31575 DIAGNOSTIC LARYNGOSCOPY: CPT | Mod: PBBFAC,PN | Performed by: SPECIALIST

## 2022-02-10 PROCEDURE — 99999 PR PBB SHADOW E&M-EST. PATIENT-LVL III: ICD-10-PCS | Mod: PBBFAC,,, | Performed by: SPECIALIST

## 2022-02-10 PROCEDURE — 69210 EAR CERUMEN REMOVAL: ICD-10-PCS | Mod: 51,S$PBB,, | Performed by: SPECIALIST

## 2022-02-10 NOTE — PROGRESS NOTES
Subjective:       Patient ID: Klever Link is a 77 y.o. male.    Chief Complaint: Follow-up (With Scope)     Patient is returning to discuss issues:    1. Laryngopharyngeal reflux:  Regarding his reflux he does have recurring periodic hoarseness.  He is not having phlegm in throat of throat clearing or globus sensation.  He has noticed no detrimental changes since switching to famotidine 20 mg twice daily   2.  Hearing loss:   He  wears bilateral hearing aids dispensed by the [facility] .  He is very pleased with the results.    3.  Tinnitus:  There has been no change in his tinnitus  4.  Allergic rhinitis:    He uses OTC medications to control symptoms  5. Benign paroxysmal positional vertigo, right:  The patient has had no  .    Review of Systems     Constitutional: Negative for appetite change, chills, fatigue, fever and unexpected weight loss.      HENT: Positive for hearing loss, postnasal drip, ringing in the ears, runny nose, trouble swallowing and voice change.      Eyes:  Negative for change in eyesight, eye drainage, eye itching and photophobia.     Respiratory:  Negative for cough, shortness of breath, sleep apnea, snoring and wheezing.      Cardiovascular:  Negative for chest pain, foot swelling, irregular heartbeat and swollen veins.     Gastrointestinal:  Negative for abdominal pain, acid reflux, constipation, diarrhea, heartburn and vomiting.     Genitourinary: Negative for difficulty urinating, sexual problems and frequent urination.     Musc: Negative for aching joints, aching muscles, back pain and neck pain.     Skin: Negative for rash.     Allergy: Positive for seasonal allergies. Negative for food allergies.     Endocrine: Negative for cold intolerance and heat intolerance.      Neurological:  Positive for dizziness.  Negative for headaches, light-headedness, seizures and tremors.      Hematologic: Negative for bruises/bleeds easily and swollen glands.      Psychiatric:  Negative for decreased concentration, depression, nervous/anxious and sleep disturbance.                Objective:      Physical Exam  Vitals and nursing note reviewed.   Constitutional:       General: He is awake.      Appearance: Normal appearance. He is well-developed, well-groomed and normal weight.   HENT:      Head: Normocephalic.      Salivary Glands: Right salivary gland is not diffusely enlarged. Left salivary gland is not diffusely enlarged.      Right Ear: Ear canal and external ear normal. Decreased hearing, partial cerumen impaction noted. Tympanic membrane is retracted.      Left Ear: Ear canal and external ear normal. Decreased hearing, partial cerumen impaction noted. Tympanic membrane is retracted.      Nose: Septal deviation (To the left) and rhinorrhea present. No nasal deformity or mucosal edema. Rhinorrhea is clear.      Right Turbinates: Enlarged and pale.      Left Turbinates: Enlarged and pale.      Comments:       Mouth/Throat:      Lips: Pink. No lesions.      Mouth: Mucous membranes are moist. No oral lesions.      Dentition: Has dentures ( full upper and lower dentures). No gum lesions.      Tongue: No lesions ( manual palpation of the tongue reveals no tenderness, mass or induration of the base of the tongue).      Palate: No mass and lesions.      Pharynx: Oropharynx is clear. Uvula midline. No oropharyngeal exudate.      Comments: Bimanual examination-no palpable masses or tenderness in the base of the tongue, no palpable cervical adenopathy                         [place]-Hallpike maneuver:  Positive for BPPV on the right  Eyes:      General: Lids are normal.         Right eye: No discharge.         Left eye: No discharge.      Conjunctiva/sclera: Conjunctivae normal.      Right eye: Right conjunctiva is not injected.      Left eye: Left conjunctiva is not injected.      Pupils: Pupils are equal, round, and reactive to light.   Neck:      Thyroid: No thyroid mass or thyromegaly.       Trachea: Trachea normal. No tracheal deviation.   Cardiovascular:      Rate and Rhythm: Normal rate and regular rhythm.      Pulses: Normal pulses.      Heart sounds: Normal heart sounds. No murmur heard.   No gallop.    Pulmonary:      Effort: Pulmonary effort is normal.      Breath sounds: Normal breath sounds. No decreased breath sounds, wheezing, rhonchi or rales.   Abdominal:      General: Bowel sounds are normal.      Palpations: Abdomen is soft.      Tenderness: There is no abdominal tenderness.   Musculoskeletal:      Right shoulder: Normal.      Cervical back: Neck supple. Decreased range of motion.   Lymphadenopathy:      Head:      Right side of head: No submental, submandibular or occipital adenopathy.      Left side of head: No submental, submandibular or occipital adenopathy.      Cervical: No cervical adenopathy.   Skin:     General: Skin is warm and dry.      Findings: No rash.      Nails: There is no clubbing.   Neurological:      Mental Status: He is alert and oriented to person, place, and time.      Cranial Nerves: No cranial nerve deficit.      Sensory: No sensory deficit.      Gait: Gait normal.   Psychiatric:         Speech: Speech normal.         Behavior: Behavior normal. Behavior is cooperative.     Procedure-bilateral cerumen impactions are removed with wire loop and bayonet forceps.  The patient tolerated procedure well.    Flexible Nasolaryngoscopy:  Nasal septal deviation and nasal changes allergic rhinitis; laryngeal changes consistent with laryngopharyngeal reflux that is slowly improving on H2 agonist therapy    Assessment:       1. Bilateral impacted cerumen    2. Laryngopharyngeal reflux (LPR)    3. Dysphagia, unspecified type    4. BPPV (benign paroxysmal positional vertigo), right    5. Allergic rhinitis, unspecified seasonality, unspecified trigger    6. Nasal septal deviation    7. Sensorineural hearing loss (SNHL) of both ears    8. Hearing aid worn        Plan:       I will  have the patient continue with his current drug regimen.  I will recheck him in 3 months, or sooner on an as-needed basis.

## 2022-02-10 NOTE — PROCEDURES
"Ear Cerumen Removal    Date/Time: 2/10/2022 8:40 AM  Performed by: EMI Dahl MD  Authorized by: EMI Dahl MD     Time out: Immediately prior to procedure a "time out" was called to verify the correct patient, procedure, equipment, support staff and site/side marked as required.    Consent Done?:  Yes (Verbal)    Local anesthetic:  None  Location details:  Both ears  Procedure type comment:  Wire loop and bayonet forceps  Cerumen  Removal Results:  Cerumen completely removed  Patient tolerance:  Patient tolerated the procedure well with no immediate complications  Laryngoscopy    Date/Time: 2/10/2022 8:40 AM  Performed by: EMI Dahl MD  Authorized by: EMI Dahl MD     Time out: Immediately prior to procedure a "time out" was called to verify the correct patient, procedure, equipment, support staff and site/side marked as required.    Consent Done?:  Yes (Verbal)  Anesthesia:     Local anesthetic:  4% Xylocaine spray with Viral-Synephrine (Topical aerosol)    Patient tolerance:  Patient tolerated the procedure well with no immediate complications    Decongestion performed?: Yes    Laryngoscopy:     Areas examined:  Vocal cords, larynx, hypopharynx, oropharynx, nasopharynx and nasal cavities    Laryngoscope size:  4 mm (Flexible video nasolaryngoscopy)  Nose Intranasal:      Mucosa no polyps     Mucosa ulcers not present     No mucosa lesions present (Clear mucus in the nasal passages bilaterally)     Septum gross deformity ( septum deviated to the left with a posterior spur to the left)     Enlarged turbinates ( inferior turbinates enlarged bilaterally)  Nasopharynx:      No mucosa lesions     Adenoids not present     Posterior choanae patent     Eustachian tube patent  Larynx/hypopharynx:      No epiglottis lesions     No epiglottis edema     No AE folds lesions     No vocal cord polyps     Equal and normal bilateral ( vocal cords move symmetrically,no mucosal lesions noted)     No " hypopharynx lesions     No piriform sinus pooling     No piriform sinus lesions     Post cricoid edema ( minimal, minimally improved from last endoscopy)     Post cricoid erythema ( minimal, improved slightly from last endoscopy)     Flexible video nasolaryngoscopy-nasal septal deviation and nasal changes of allergic rhinitis; laryngeal changes consistent with laryngopharyngeal reflux that has been stable and slowly improving on H2 agonist therapy

## 2022-02-22 ENCOUNTER — OFFICE VISIT (OUTPATIENT)
Dept: PODIATRY | Facility: CLINIC | Age: 77
End: 2022-02-22
Payer: MEDICARE

## 2022-02-22 VITALS
SYSTOLIC BLOOD PRESSURE: 137 MMHG | RESPIRATION RATE: 18 BRPM | BODY MASS INDEX: 23.02 KG/M2 | HEART RATE: 71 BPM | DIASTOLIC BLOOD PRESSURE: 83 MMHG | HEIGHT: 76 IN | WEIGHT: 189 LBS

## 2022-02-22 DIAGNOSIS — E11.49 TYPE II DIABETES MELLITUS WITH NEUROLOGICAL MANIFESTATIONS: Primary | ICD-10-CM

## 2022-02-22 DIAGNOSIS — B35.1 ONYCHOMYCOSIS DUE TO DERMATOPHYTE: ICD-10-CM

## 2022-02-22 PROCEDURE — 11721 DEBRIDE NAIL 6 OR MORE: CPT | Mod: Q9,S$PBB,, | Performed by: PODIATRIST

## 2022-02-22 PROCEDURE — 99999 PR PBB SHADOW E&M-EST. PATIENT-LVL III: ICD-10-PCS | Mod: PBBFAC,,, | Performed by: PODIATRIST

## 2022-02-22 PROCEDURE — 11721 PR DEBRIDEMENT OF NAILS, 6 OR MORE: ICD-10-PCS | Mod: Q9,S$PBB,, | Performed by: PODIATRIST

## 2022-02-22 PROCEDURE — 99499 UNLISTED E&M SERVICE: CPT | Mod: S$PBB,,, | Performed by: PODIATRIST

## 2022-02-22 PROCEDURE — 99499 NO LOS: ICD-10-PCS | Mod: S$PBB,,, | Performed by: PODIATRIST

## 2022-02-22 PROCEDURE — 11721 DEBRIDE NAIL 6 OR MORE: CPT | Mod: Q9,PBBFAC | Performed by: PODIATRIST

## 2022-02-22 PROCEDURE — 99213 OFFICE O/P EST LOW 20 MIN: CPT | Mod: PBBFAC | Performed by: PODIATRIST

## 2022-02-22 PROCEDURE — 99999 PR PBB SHADOW E&M-EST. PATIENT-LVL III: CPT | Mod: PBBFAC,,, | Performed by: PODIATRIST

## 2022-02-22 RX ORDER — PANTOPRAZOLE SODIUM 40 MG/1
1 TABLET, DELAYED RELEASE ORAL 2 TIMES DAILY
COMMUNITY
Start: 2021-03-01 | End: 2022-05-04

## 2022-02-22 RX ORDER — ATORVASTATIN CALCIUM 80 MG/1
1 TABLET, FILM COATED ORAL DAILY
COMMUNITY
Start: 2021-03-01 | End: 2023-02-15

## 2022-02-22 RX ORDER — METFORMIN HYDROCHLORIDE 500 MG/1
1 TABLET ORAL DAILY
COMMUNITY
Start: 2021-10-14 | End: 2023-08-22

## 2022-02-22 NOTE — PROGRESS NOTES
Subjective:      Patient ID: Klever Link is a 77 y.o. male.    Chief Complaint: PCP (Petrona Welch MD F/U 3/2/22), Diabetic Foot Exam, Nail Problem, and Nail Care    Klever is a 77 y.o. male who presents to the clinic for evaluation and treatment of high risk feet. Klever has a past medical history of Diabetes mellitus, Expressive aphasia (2/12/2015), Hyperlipidemia, Hypertension, Stroke, and TIA (transient ischemic attack) (02/2019). The patient's chief complaint is long, thick toenails. This patient has documented high risk feet requiring routine maintenance secondary to diabetes mellitis and those secondary complications of diabetes, as mentioned..    PCP: Petrona Welch MD    Date Last Seen by PCP:   Chief Complaint   Patient presents with    PCP     Petrona Welch MD F/U 3/2/22    Diabetic Foot Exam    Nail Problem    Nail Care           Hemoglobin A1C   Date Value Ref Range Status   02/20/2019 7.6 (H) 4.0 - 5.6 % Final     Comment:     ADA Screening Guidelines:  5.7-6.4%  Consistent with prediabetes  >or=6.5%  Consistent with diabetes  High levels of fetal hemoglobin interfere with the HbA1C  assay. Heterozygous hemoglobin variants (HbS, HgC, etc)do  not significantly interfere with this assay.   However, presence of multiple variants may affect accuracy.     02/12/2015 13.5 (H) 4.5 - 6.2 % Final       Review of Systems   Constitutional: Negative for chills, decreased appetite and fever.   Cardiovascular: Negative for leg swelling.   Skin: Positive for nail changes.   Musculoskeletal: Negative for arthritis, joint pain, joint swelling and myalgias.   Gastrointestinal: Negative for nausea and vomiting.   Neurological: Negative for loss of balance, numbness and paresthesias.           Objective:      Physical Exam  Constitutional:       Appearance: He is well-developed.   Cardiovascular:      Comments: Dorsalis pedis and posterior tibial pulses are decreased Bilaterally. Toes are  cool to touch. Feet are warm proximally.There is decreased digital hair. Skin is atrophic, slightly hyperpigmented, and mildly edematous      Musculoskeletal:         General: No tenderness. Normal range of motion.      Comments: Adequate joint range of motion without pain, limitation, nor crepitation Bilateral feet and ankle joints. Muscle strength is 5/5 in all groups bilaterally.         Skin:     General: Skin is warm and dry.      Findings: No ecchymosis, erythema or lesion.      Comments: Nails x10 are elongated by  2-5mm's, thickened by 3-7 mm's, dystrophic, and are darkened in  coloration .  No open lesions noted.    Skin supple, well moisturized, tinea resolved   Neurological:      Mental Status: He is alert and oriented to person, place, and time.      Comments: [place] 5.07 monofilamant testing is diminished Dheeraj feet. Sharp/dull sensation diminished Bilaterally. Light touch absent Bilaterally.       Psychiatric:         Behavior: Behavior normal.               Assessment:       Encounter Diagnoses   Name Primary?    Type II diabetes mellitus with neurological manifestations Yes    Onychomycosis due to dermatophyte          Plan:       Klever was seen today for pcp, diabetic foot exam, nail problem and nail care.    Diagnoses and all orders for this visit:    Type II diabetes mellitus with neurological manifestations    Onychomycosis due to dermatophyte      I counseled the patient on his conditions, their implications and medical management.        - Shoe inspection. Diabetic Foot Education. Patient reminded of the importance of good nutrition and blood sugar control to help prevent podiatric complications of diabetes. Patient instructed on proper foot hygeine. We discussed wearing proper shoe gear, daily foot inspections, never walking without protective shoe gear, never putting sharp instruments to feet, routine podiatric nail visits every 2-3 months.      - With patient's permission, nails  were aggressively reduced and debrided x 10 to their soft tissue attachment mechanically and with electric , removing all offending nail and debris. Patient relates relief following the procedure. He will continue to monitor the areas daily, inspect his feet, wear protective shoe gear when ambulatory, moisturizer to maintain skin integrity and follow in this office in approximately 2-3 months, sooner p.r.n.

## 2022-05-02 ENCOUNTER — OFFICE VISIT (OUTPATIENT)
Dept: URGENT CARE | Facility: CLINIC | Age: 77
End: 2022-05-02
Payer: MEDICARE

## 2022-05-02 VITALS
HEART RATE: 88 BPM | SYSTOLIC BLOOD PRESSURE: 123 MMHG | TEMPERATURE: 99 F | BODY MASS INDEX: 22.63 KG/M2 | RESPIRATION RATE: 16 BRPM | WEIGHT: 182 LBS | OXYGEN SATURATION: 98 % | HEIGHT: 75 IN | DIASTOLIC BLOOD PRESSURE: 83 MMHG

## 2022-05-02 DIAGNOSIS — R19.7 DIARRHEA, UNSPECIFIED TYPE: Primary | ICD-10-CM

## 2022-05-02 DIAGNOSIS — R11.0 NAUSEA: ICD-10-CM

## 2022-05-02 LAB
CTP QC/QA: YES
POC MOLECULAR INFLUENZA A AGN: NEGATIVE
POC MOLECULAR INFLUENZA B AGN: NEGATIVE

## 2022-05-02 PROCEDURE — 99203 OFFICE O/P NEW LOW 30 MIN: CPT | Mod: S$GLB,,,

## 2022-05-02 PROCEDURE — 87502 INFLUENZA DNA AMP PROBE: CPT | Mod: QW,S$GLB,,

## 2022-05-02 PROCEDURE — 99203 PR OFFICE/OUTPT VISIT, NEW, LEVL III, 30-44 MIN: ICD-10-PCS | Mod: S$GLB,,,

## 2022-05-02 PROCEDURE — 87502 POCT INFLUENZA A/B MOLECULAR: ICD-10-PCS | Mod: QW,S$GLB,,

## 2022-05-02 RX ORDER — LOPERAMIDE HYDROCHLORIDE 2 MG/1
CAPSULE ORAL
Qty: 16 CAPSULE | Refills: 0 | Status: SHIPPED | OUTPATIENT
Start: 2022-05-02 | End: 2022-10-17

## 2022-05-02 NOTE — PATIENT INSTRUCTIONS
PLEASE READ ALL DISCHARGE INSTRUCTIONS    Gastroenteritis  If your condition worsens or fails to improve we recommend that you receive another evaluation at the ER immediately or contact your PCP to discuss your concerns or return here. You must understand that you've received an urgent care treatment only and that you may be released before all your medical problems are known or treated. You the patient will arrange for followup care as instructed.   If you have diarrhea you can use Pepto Bismol.   Take Imodium as prescribed if diarrhea returns  Increase fluids and rest is important   Start off with liquid diet and progress as tolerated (see below)  Water and clear liquids are important so you do not get dehydrated. Drink a small amount at a time.  Do not force yourself to eat, especially if you have cramps, vomiting, or diarrhea. When you finally decide to start eating, do not eat large amounts at a time, even if you are hungry.  If you eat, avoid fatty, greasy, spicy, or fried foods.  Do not eat dairy products if you have diarrhea; they can make the diarrhea worse  Watch for any increase pain, fever, localized pain to right lower abdomen or continued vomiting or diarrhea. If this occurs, go to the emergency room

## 2022-05-02 NOTE — PROGRESS NOTES
"Subjective:       Patient ID: Klever Link is a 77 y.o. male.    Vitals:  height is 6' 3" (1.905 m) and weight is 82.6 kg (182 lb). His temperature is 98.6 °F (37 °C). His blood pressure is 123/83 and his pulse is 88. His respiration is 16 and oxygen saturation is 98%.     Chief Complaint: Nausea    Began on Saturday with nausea, diarrhea and vomiting. Pt states feels weak    Provider note starts below:  Patient presents endorsing nausea, diarrhea and vomiting since Saturday.  He states he vomits about once a day but has been able to hold down liquids and drink water.  He states his diarrhea was watery brown on Saturday but has not had any diarrhea since then.  Denies any fever, chills, blood in the stool, pain with defecation, abdominal pain or any other symptoms.  Patient states his fasting glucose this morning was 127.     Nausea  This is a new problem. The current episode started in the past 7 days. The problem occurs intermittently. The problem has been waxing and waning. Associated symptoms include nausea and vomiting. Pertinent negatives include no abdominal pain, chest pain, chills, fever or myalgias. The symptoms are aggravated by drinking and eating. He has tried nothing for the symptoms.       Constitution: Positive for generalized weakness. Negative for chills and fever.   Cardiovascular: Negative for chest pain and palpitations.   Respiratory: Negative for shortness of breath.    Gastrointestinal: Positive for nausea, vomiting and diarrhea. Negative for abdominal pain and bright red blood in stool.   Genitourinary: Negative for dysuria, frequency, urgency, flank pain and hematuria.   Musculoskeletal: Negative for muscle ache.   Neurological: Negative for dizziness and light-headedness.       Objective:      Physical Exam   Constitutional: He is oriented to person, place, and time. He appears well-developed.   HENT:   Head: Normocephalic and atraumatic.   Ears:   Right Ear: External ear normal. "   Left Ear: External ear normal.   Nose: Nose normal.   Mouth/Throat: Mucous membranes are normal. Mucous membranes are moist. Oropharynx is clear.   Eyes: Conjunctivae and lids are normal.   Neck: Trachea normal. Neck supple.   Cardiovascular: Normal rate, regular rhythm, S1 normal and normal heart sounds.   Pulses:       Radial pulses are 2+ on the right side and 2+ on the left side.        Dorsalis pedis pulses are 2+ on the right side.   Pulmonary/Chest: Effort normal and breath sounds normal. No respiratory distress.   Abdominal: Normal appearance and bowel sounds are normal. He exhibits no distension, no abdominal bruit, no pulsatile midline mass and no mass. Soft. There is no abdominal tenderness. There is no left CVA tenderness and no right CVA tenderness.   Musculoskeletal: Normal range of motion.         General: Normal range of motion.   Neurological: He is alert and oriented to person, place, and time. He has normal strength.   Skin: Skin is warm, dry, intact, not diaphoretic and not pale.   Psychiatric: His speech is normal and behavior is normal. Judgment and thought content normal.   Nursing note and vitals reviewed.    Results for orders placed or performed in visit on 05/02/22   POCT Influenza A/B MOLECULAR   Result Value Ref Range    POC Molecular Influenza A Ag Negative Negative, Not Reported    POC Molecular Influenza B Ag Negative Negative, Not Reported     Acceptable Yes            Assessment:       1. Diarrhea, unspecified type    2. Nausea          Plan:     labs reviewed.  Low suspicion for gastroparesis versus DKA given patient a normal and controlled fasting glucose this morning is symptoms have been going on 2 days.  Likely viral.  No signs dehydration on exam.  Vital signs stable.  Strict ED precautions discussed in length.  Patient has no known tolerance to Zofran and has history of PVCs.  Deferred medicating nausea at this time.  Patient agreed with this plan.  All  questions answered.  Patient discharged in no acute distress.    Diarrhea, unspecified type  -     loperamide (IMODIUM) 2 mg capsule; Imodium: 4 mg (2 capsules), followed by 2 mg (1 capsule) after each loose stool; maximum: MAX 16 mg/day (8 capsules). Limit use to =48 hours  Dispense: 16 capsule; Refill: 0    Nausea  -     POCT Influenza A/B MOLECULAR      Patient Instructions   PLEASE READ ALL DISCHARGE INSTRUCTIONS    Gastroenteritis  If your condition worsens or fails to improve we recommend that you receive another evaluation at the ER immediately or contact your PCP to discuss your concerns or return here. You must understand that you've received an urgent care treatment only and that you may be released before all your medical problems are known or treated. You the patient will arrange for followup care as instructed.   If you have diarrhea you can use Pepto Bismol.   Take Imodium as prescribed if diarrhea returns  Increase fluids and rest is important   Start off with liquid diet and progress as tolerated (see below)  · Water and clear liquids are important so you do not get dehydrated. Drink a small amount at a time.  · Do not force yourself to eat, especially if you have cramps, vomiting, or diarrhea. When you finally decide to start eating, do not eat large amounts at a time, even if you are hungry.  · If you eat, avoid fatty, greasy, spicy, or fried foods.  · Do not eat dairy products if you have diarrhea; they can make the diarrhea worse  Watch for any increase pain, fever, localized pain to right lower abdomen or continued vomiting or diarrhea. If this occurs, go to the emergency room

## 2022-05-04 ENCOUNTER — OFFICE VISIT (OUTPATIENT)
Dept: CARDIOLOGY | Facility: CLINIC | Age: 77
End: 2022-05-04
Payer: MEDICARE

## 2022-05-04 VITALS
HEART RATE: 68 BPM | WEIGHT: 182 LBS | BODY MASS INDEX: 22.63 KG/M2 | HEIGHT: 75 IN | OXYGEN SATURATION: 95 % | SYSTOLIC BLOOD PRESSURE: 130 MMHG | DIASTOLIC BLOOD PRESSURE: 68 MMHG

## 2022-05-04 DIAGNOSIS — I49.1 PREMATURE ATRIAL CONTRACTIONS: ICD-10-CM

## 2022-05-04 DIAGNOSIS — R00.2 PALPITATIONS: ICD-10-CM

## 2022-05-04 DIAGNOSIS — I67.1 SACCULAR ANEURYSM: ICD-10-CM

## 2022-05-04 DIAGNOSIS — I10 PRIMARY HYPERTENSION: Primary | ICD-10-CM

## 2022-05-04 DIAGNOSIS — E78.2 MIXED HYPERLIPIDEMIA: ICD-10-CM

## 2022-05-04 DIAGNOSIS — Z86.73 HISTORY OF STROKE WITHOUT RESIDUAL DEFICITS: ICD-10-CM

## 2022-05-04 DIAGNOSIS — E78.1 HYPERTRIGLYCERIDEMIA: ICD-10-CM

## 2022-05-04 DIAGNOSIS — E11.9 TYPE 2 DIABETES MELLITUS WITHOUT COMPLICATION, WITHOUT LONG-TERM CURRENT USE OF INSULIN: ICD-10-CM

## 2022-05-04 DIAGNOSIS — H81.11 BPPV (BENIGN PAROXYSMAL POSITIONAL VERTIGO), RIGHT: ICD-10-CM

## 2022-05-04 DIAGNOSIS — I65.22 CAROTID STENOSIS, ASYMPTOMATIC, LEFT: ICD-10-CM

## 2022-05-04 DIAGNOSIS — I49.3 PVCS (PREMATURE VENTRICULAR CONTRACTIONS): ICD-10-CM

## 2022-05-04 PROCEDURE — 93005 ELECTROCARDIOGRAM TRACING: CPT | Mod: PBBFAC,PN | Performed by: INTERNAL MEDICINE

## 2022-05-04 PROCEDURE — 93010 ELECTROCARDIOGRAM REPORT: CPT | Mod: S$PBB,,, | Performed by: INTERNAL MEDICINE

## 2022-05-04 PROCEDURE — 99214 PR OFFICE/OUTPT VISIT, EST, LEVL IV, 30-39 MIN: ICD-10-PCS | Mod: S$PBB,25,, | Performed by: INTERNAL MEDICINE

## 2022-05-04 PROCEDURE — 99999 PR PBB SHADOW E&M-EST. PATIENT-LVL IV: ICD-10-PCS | Mod: PBBFAC,,, | Performed by: INTERNAL MEDICINE

## 2022-05-04 PROCEDURE — 93010 EKG 12-LEAD: ICD-10-PCS | Mod: S$PBB,,, | Performed by: INTERNAL MEDICINE

## 2022-05-04 PROCEDURE — 99214 OFFICE O/P EST MOD 30 MIN: CPT | Mod: S$PBB,25,, | Performed by: INTERNAL MEDICINE

## 2022-05-04 PROCEDURE — 99999 PR PBB SHADOW E&M-EST. PATIENT-LVL IV: CPT | Mod: PBBFAC,,, | Performed by: INTERNAL MEDICINE

## 2022-05-04 PROCEDURE — 99214 OFFICE O/P EST MOD 30 MIN: CPT | Mod: PBBFAC,PN | Performed by: INTERNAL MEDICINE

## 2022-05-04 NOTE — PROGRESS NOTES
Subjective:      Patient ID: Klever Link is a 77 y.o. male.    Chief Complaint: Follow-up and Hypertension    HPI:  Mows the lawn.    Walks.    Gardens    Occasionally feels weak upon arising in the morning but not recently.      Review of Systems   Cardiovascular: Negative for chest pain, claudication, dyspnea on exertion, irregular heartbeat, leg swelling, near-syncope, orthopnea, palpitations and syncope.      Pt reports HgbA1C with Dr Welch at [facility] was up to 8 (from 7.2)    Pt reports BP at home 125/75      Pt quit smoking in     Past Medical History:   Diagnosis Date    Diabetes mellitus     Expressive aphasia 2015    Hyperlipidemia     Hypertension     Stroke     TIA (transient ischemic attack) 2019        Past Surgical History:   Procedure Laterality Date    KNEE SURGERY Bilateral        Family History   Problem Relation Age of Onset    Diabetes Mother     Ovarian cancer Mother     Hypertension Father     Stroke Father     Diabetes Sister     Diabetes Sister     Diabetes Sister     Stroke Sister        Social History     Socioeconomic History    Marital status:    Tobacco Use    Smoking status: Former Smoker     Packs/day: 1.00     Years: 0.00     Pack years: 0.00     Start date: 1965     Quit date: 2008     Years since quittin.7    Smokeless tobacco: Never Used   Substance and Sexual Activity    Alcohol use: Not Currently    Drug use: No    Sexual activity: Yes     Partners: Female       Current Outpatient Medications on File Prior to Visit   Medication Sig Dispense Refill    ASPIR-LOW 81 mg EC tablet       atorvastatin (LIPITOR) 80 MG tablet Take 1 tablet by mouth once daily.      AZOPT 1 % ophthalmic suspension Place into both eyes 3 (three) times daily.      blood sugar diagnostic Strp 1 strip by Misc.(Non-Drug; Combo Route) route 3 (three) times daily. 100 strip 1    famotidine (PEPCID) 20 MG tablet Take 1 tablet (20 mg total) by  "mouth 2 (two) times daily. 180 tablet 3    FREESTYLE LANCETS 28 gauge lancets       hydrochlorothiazide (HYDRODIURIL) 25 MG tablet Take 25 mg by mouth once daily.      ketoconazole (NIZORAL) 2 % cream Apply topically once daily. 60 g 3    lisinopril (PRINIVIL,ZESTRIL) 20 MG tablet Take 20 mg by mouth once daily.      loperamide (IMODIUM) 2 mg capsule Imodium: 4 mg (2 capsules), followed by 2 mg (1 capsule) after each loose stool; maximum: MAX 16 mg/day (8 capsules). Limit use to =48 hours 16 capsule 0    LUMIGAN 0.01 % Drop every evening.      metFORMIN (GLUCOPHAGE) 500 MG tablet Take 1 tablet by mouth once daily.      vit A/vit C/vit E/zinc/copper (PRESERVISION AREDS ORAL) Take by mouth 2 (two) times a day.      [DISCONTINUED] pantoprazole (PROTONIX) 40 MG tablet Take 1 tablet by mouth 2 (two) times daily.       No current facility-administered medications on file prior to visit.       Review of patient's allergies indicates:  No Known Allergies  Objective:     Vitals:    05/04/22 0736 05/04/22 0812   BP: (!) 159/74 130/68   BP Location: Right arm Right arm   Patient Position: Sitting Sitting   BP Method: Large (Automatic)    Pulse: 68    SpO2: 95%    Weight: 82.6 kg (181 lb 15.8 oz)    Height: 6' 3" (1.905 m)         Physical Exam  Vitals reviewed.   Constitutional:       General: He is not in acute distress.     Appearance: He is well-developed. He is not diaphoretic.   Eyes:      General: No scleral icterus.  Neck:      Vascular: No carotid bruit or JVD.   Cardiovascular:      Rate and Rhythm: Regular rhythm.      Heart sounds: Normal heart sounds. No murmur heard.    No friction rub. No gallop.   Pulmonary:      Effort: Pulmonary effort is normal. No respiratory distress.      Breath sounds: Normal breath sounds.   Musculoskeletal:      Right lower leg: No edema.      Left lower leg: No edema.   Skin:     General: Skin is warm and dry.   Neurological:      Mental Status: He is alert and oriented to " person, place, and time.   Psychiatric:         Behavior: Behavior normal.         Thought Content: Thought content normal.         Judgment: Judgment normal.              ECG today: NSR with PAC's, LVH    Office Visit on 05/02/2022   Component Date Value Ref Range Status    POC Molecular Influenza A Ag 05/02/2022 Negative  Negative, Not Reported Final    POC Molecular Influenza B Ag 05/02/2022 Negative  Negative, Not Reported Final     Acceptable 05/02/2022 Yes   Final   Hospital Outpatient Visit on 02/07/2022   Component Date Value Ref Range Status    SARS-CoV2 (COVID-19) Qualitative P* 02/07/2022 Not Detected  Not Detected Final    SARS-COV-2- Cycle Number 02/07/2022 N/A   Final   (  Accession #: [id number]    Transthoracic echo (TTE) complete  Order# [id number]  Reading physicians: Petrona Frank MD; Mejia Goodrich III, MD Ordering physician: Adrienne Shahid NP Study date: 2/21/19       Reason for Exam  Priority: Routine  Dx: Stroke [I63.9 (ICD-10-CM)]   Comments: To investigate for wall motion abnormalities     Result Image Hyperlink     Show images for Transthoracic echo (TTE) complete (Cupid Only)    Summary    · Normal left ventricular systolic function. The estimated ejection fraction is 55%  · No wall motion abnormalities.  · Normal LV diastolic function.  · Normal right ventricular systolic function.  · Normal central venous pressure (3 mm Hg).         Vitals    Height     Accession #: [id number]    Klever Link  Cardiac event monitor  Order# [id number]  Reading physician: Enrique Mora MD Ordering physician: Jose Will NP Study date: 2/21/19       Reason for Exam  Priority: Routine  Dx: TIA (transient ischemic attack) [G45.9 (ICD-10-CM)]         Conclusion    · Negative event monitor with no clinical arrhythmias.  · No atrial arrhythmia observed         Performing Clinician    Ernesto Appiah     Ref Range & Units1 yr ago   (12/5/20)3 yr ago   (2/21/19)3 yr  ago   (2/20/19)3 yr ago   (2/19/19)7 yr ago   (2/12/15) WBC3.90 - 12.70 K/uL6.02 5.45 6.54 5.78 5.54 RBC4.60 - 6.20 M/uL5.70 5.68 5.72 5.76 6.04 Muwslnbzyf98.0 - 18.0 g/dL15.2 15.8 15.8 15.8 16.7 Ffxpxwesio98.0 - 54.0 %49.7 48.8 50.7 50.1 49.7 MCV82 - 98 fL87 86 89 87 82 MCH27.0 - 31.0 pg26.7 Low  27.8 27.6 27.4 27.6 MCHC32.0 - 36.0 g/dL30.6 Low  32.4 31.2 Low  31.5 Low  33.6 R RDW11.5 - 14.5 %14.6 High  14.6 High  14.8 High  14.8 High  13.9 Blswicbvb872 - 350 K/uL204 201 200 212 209 MPV9.2 - 12.9 fL9.8 9.8 9.9 10.5 11.0 Immature Granulocytes   137 137 137 139 139 133 Low  Potassium3.5 - 5.1 mmol/L4.3 3.8 4.3 4.8 4.3 4.1 Aqqpplfh99 - 110 mmol/L100 104 102 103 103 97  - 29 mmol/L26 22 Low  25 30 High  24 24 Hivbxrh83 - 110 mg/dL213 High  133 High  128 High  166 High  133 High  430 High  BUN8 - 23 mg/dL17 16 15 16 19 18 Creatinine0.5 - 1.4 mg/dL1.1 1.0 1.2 1.2 1.2 1.5 High  Calcium8.7 - 10.5 mg/dL9.8 10.2 10.8 High  10.9 High  10.0 10.4 Total Protein6.0 - 8.4 g/dL7.4 6.6 7.2 7.4 7.4 Albumin3.5 - 5.2 g/dL4.3 3.9 4.3 4.3 4.2 Total Bilirubin0.1 - 1.0 mg/dL0.5 0.6 CM 0.6 CM 0.5 CM 0.6 CM Comment: For infants and newborns, interpretation of results should be based   on gestational age, weight and in agreement with clinical   observations.   Premature Infant recommended reference ranges:   Up to 24 hours.............<8.0 mg/dL   Up to 48 hours............<12.0 mg/dL   3-5 days..................<15.0 mg/dL   6-29 days.................<15.0 mg/dL   Alkaline Xruznwkecgd98 - 135 U/L76 69 74 74 104 AST10 - 40 U/L17 14 16 15 15 ALT10 - 44 U/L19 13 13 15 22 Anion Gap8 - 16 mmol/L11 11 10 6 Low  12 12 eGFR if African American>60 mL/min/1.73 m^2>60.0 >60.0 >60.0 >60.0 >60 54 Abnormal  eGFR if non African American>60 mL/min/1.73 m^2>60.0 >60.0 CM 59.2 Abnormal  CM 59.2 Abnormal  CM >60 CM 46 Abnormal  CM Comment: Calculation used to obtain the estimated glomerular filtration   rate (eGFR) is the CKD-EPI equation   Accession #:  [id number]    Transthoracic echo (TTE) complete  Order# [id number]  Reading physicians: Petrona Frank MD; Mejia Goodrich III, MD Ordering physician: Adrienne Shahid NP Study date: 2/21/19       Reason for Exam  Priority: Routine  Dx: Stroke [I63.9 (ICD-10-CM)]   Comments: To investigate for wall motion abnormalities     Result Image Hyperlink     Show images for Transthoracic echo (TTE) complete (Cupid Only)    Summary    · Normal left ventricular systolic function. The estimated ejection fraction is 55%  · No wall motion abnormalities.  · Normal LV diastolic function.  · Normal right ventricular systolic function.  · Normal central venous pressure (3 mm Hg).         Vitals    Height     Accession #: [id number]    Klever Link  Cardiac event monitor  Order# [id number]  Reading physician: Enrique Mora MD Ordering physician: Jose Will NP Study date: 2/21/19       Reason for Exam  Priority: Routine  Dx: TIA (transient ischemic attack) [G45.9 (ICD-10-CM)]         Conclusion    · Negative event monitor with no clinical arrhythmias.  · No atrial arrhythmia observed         Performing Clinician    Ernesto Appiah   · Next appt: 05/12/2022 at 08:20 AM in Otolaryngology (BERTIN Dahl MD)    · Dx: Stenosis of carotid artery, unspecifi...   · 0 Result Notes    Details    Reading Physician Reading Date Result Priority   Milla Smith MD  [phone]  [phone] 12/8/2021 Routine     Narrative & Impression  EXAMINATION:  US CAROTID BILATERAL     CLINICAL HISTORY:  Occlusion and stenosis of unspecified carotid artery     TECHNIQUE:  Grayscale and color Doppler ultrasound examination of the carotid and vertebral artery systems bilaterally.  Stenosis estimates are per the NASCET measurement criteria.     COMPARISON:  11/20/2020     FINDINGS:  Right:     Internal Carotid Artery (ICA) peak systolic velocity 73 cm/sec     ICA/CCA peak systolic velocity ratio: 1.5     Plaque formation: Moderate  heterogeneous at the bifurcation     Vertebral artery: Antegrade flow and normal waveform.     Left:     Internal Carotid Artery (ICA)  peak systolic velocity 105 cm/sec     ICA/CCA peak systolic velocity ratio: 2.9     Plaque formation: Moderate heterogeneous at the bifurcation.  There appears to be additional plaque along the distal internal carotid artery.     Vertebral artery: Antegrade flow and normal waveform.     Impression:     Overall, stable exam compared to previous.  Persistent moderate plaque at the carotid artery bifurcation bilaterally with approximately 50% narrowing on the left.        Electronically signed by: Milla Smith  Date:                                            12/08/2021  Time:                                           13:50             Exam Ended: 12/08/21 09:46             Details    Reading Physician Reading Date Result Priority   Dale Vázquez MD  [phone]  [phone] 12/5/2020 STAT     Narrative & Impression  EXAMINATION:  CTA HEAD AND NECK (XPD)     CLINICAL HISTORY:  Carotid or vertebral dissection suspected;Dizziness, non-specific;     TECHNIQUE:  Non contrast low dose axial images were obtained through the head.  CT angiogram was performed from the level of the sarah to the top of the head following the IV administration of 100mL of Omnipaque 350.   Sagittal and coronal reconstructions and maximum intensity projection reconstructions were performed. Arterial stenosis percentages are based on NASCET measurement criteria.     COMPARISON:  CTA head neck performed 02/19/2019.  MRI brain performed 12/05/2020.     FINDINGS:  CT HEAD:     Blood: No acute intracranial hemorrhage.     Parenchyma: No definite loss of gray-white differentiation to suggest acute or subacute transcortical infarct. Generalized age-related parenchymal volume loss is noted.  Nonspecific hypoattenuation in the white matter suggests sequela of chronic small vessel ischemic disease.  Coarse vascular  calcification in the left frontal region corresponding to a distal MCA branch.     Ventricles/Extra-axial spaces: No abnormal extra-axial fluid collection. Basal cisterns are patent.     Vessels: Calcific atherosclerosis     Orbits: Unremarkable.     Scalp: Unremarkable.     Skull: There are no depressed skull fractures or destructive bone lesions.     Sinuses and mastoids: Relatively minor mucosal thickening in a suggest retention cyst in the left maxillary sinus.     Other findings: On min delayed postcontrast imaging, no pathologic intracranial enhancement is seen.  Venous structures appear patent grossly.     CTA:     NECK:     Imaged aortic arch: There appears to be anatomic variant short segment common origin of the brachiocephalic and left common carotid arteries.  Calcified and noncalcified atheromatous plaque is noted involving the aorta and great vessel origins.  Mild atheromatous narrowing of the proximal right subclavian artery.     Right common and cervical internal carotid arteries: Calcified and noncalcified atheromatous plaque results in less than 50% stenosis of the carotid bifurcation and cervical ICA.     Left common and cervical internal carotid arteries: Similar moderate narrowing of the mid common carotid artery which appears to be on the basis of predominantly fibrofatty plaque when compared to the 02/19/2019 CTA.  Similar appearance of multifocal moderate narrowing of the cervical internal carotid artery related to calcified and noncalcified atheromatous plaque, resulting approximately 50% stenosis using NASCET style measurements.     Right cervical vertebral artery: The right vertebral artery appears diminutive in caliber on a developmental basis.  There is superimposed mild in mild-to-moderate narrowing most pronounced involving the V1 segment.  The remainder of the vessel appears patent.  This appearance is unchanged.     Left cervical vertebral artery: Artery scattered multifocal mild  atheromatous narrowing without flow-limiting stenosis suggested.     HEAD:     Right anterior circulation:Multifocal moderate atheromatous narrowing of the cavernous and supraclinoid ICA.  No significant stenosis of the MARTHA or MCA.  Right ophthalmic artery is patent.There is a saccular outpouching arising at the distal supraclinoid ICA, distal to the posterior communicating artery aneurysm, possibly at the origin of the anterior choroidal artery, measuring approximately 2.9 x 2.2 by 3.6 mm (AP by TV by cc), with an approximately 2.2 mm neck.     Left anterior circulation: Multifocal moderate atheromatous narrowing involving the cavernous and supraclinoid ICA.  No significant stenosis of the MARTHA or MCA.  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     Posterior circulation: There is no hemodynamically significant vertebrobasilar stenosis.  Mild atheromatous narrowing of bilateral V4 vertebral artery segments.  Overall there is diminutive caliber of the vertebrobasilar system again noted, presumably developmental related to prominent bilateral posterior communicating arteries.  There is no significant PCA stenosis. Posterior inferior cerebellar arteries patent at origin.     Anterior and posterior communicating arteries: The anterior and posterior communicating arteries are visualized.     NON-ANGIOGRAPHIC FINDINGS:     Visualized intracranial contents: As above     Soft tissues of the neck:     There is an ill-defined lobular centrally low attenuation, peripherally enhancing lesions identified at the tongue base, crossing midline, but eccentric to the left and extending into the left the locule a. this lesion overall measures approximately 1.5 x 1.5 x 1.8 cm (axial series 3, image 314; sagittal reformat series 604, image 136).  No convincing cervical adenopathy is noted.     Visualized sinuses: As above      Dentition: Edentulous     Spine: Extensive degenerative findings in the cervical spine appear relatively similar to the 02/19/2019 CT.     Visualized lungs: Motion compromised examination.  Emphysematous changes are noted.  Calcified granuloma is noted in the right upper lobe.  Some areas of subsegmental atelectasis are additionally noted dependently.     Impression:     1. CT head without definite acute intracranial findings.  2. CTA head neck without evidence of acute large vessel occlusion or flow-limiting stenosis.  Cervical and intracranial atherosclerotic disease and associated vessel narrowing without substantial interval change relative to prior CTA head neck examination, 02/19/2019.  Details as per report body.  3. 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  This may be minimally increased in mediolateral dimension when compared to prior MRA of 02/12/2015.  4. Peripherally enhancing cystic appearing lesion centered at the tongue base extending into the left vallecula.  While this may potentially represent a benign vallecular cyst, the possibility of a mucosal based neoplasm is not excluded.  As such, correlation with direct visualization and possible referral to otolaryngology with consideration of tissue sampling would be recommended to exclude malignancy.  5. Additional details, as per report body.  This report was flagged in Epic as abnormal.        Electronically signed by: Dale Vázquez  Date:                                            12/05/2020  Time:                                           10:45             Exam Ended: 12/05/20 10:14 Last Resulted: 12/05/20 10:45                  Assessment:     1. Primary hypertension    2. Carotid stenosis, asymptomatic, left    3. Hypertriglyceridemia    4. Mixed hyperlipidemia    5. Palpitations    6. PVCs (premature ventricular contractions)    7. History of stroke without  residual deficits    8. BPPV (benign paroxysmal positional vertigo), right    9. Type 2 diabetes mellitus without complication, without long-term current use of insulin    10. Premature atrial contractions    11. Saccular aneurysm      Plan:   Klever was seen today for follow-up and hypertension.    Diagnoses and all orders for this visit:    Primary hypertension  -     IN OFFICE EKG 12-LEAD (to Muse)    Carotid stenosis, asymptomatic, left  -     IN OFFICE EKG 12-LEAD (to Muse)    Hypertriglyceridemia  -     IN OFFICE EKG 12-LEAD (to Muse)    Mixed hyperlipidemia  -     IN OFFICE EKG 12-LEAD (to Muse)    Palpitations  -     IN OFFICE EKG 12-LEAD (to Muse)    PVCs (premature ventricular contractions)  -     IN OFFICE EKG 12-LEAD (to Muse)    History of stroke without residual deficits  -     IN OFFICE EKG 12-LEAD (to Muse)    BPPV (benign paroxysmal positional vertigo), right  -     IN OFFICE EKG 12-LEAD (to Muse)    Type 2 diabetes mellitus without complication, without long-term current use of insulin  -     IN OFFICE EKG 12-LEAD (to Muse)    Premature atrial contractions    Saccular aneurysm     Pt is clinically stable     HBP is well controlled.  Pt is on high dose statin and ASA.    Carotid disease is best managed medically (incl saccular aneurysm)    Same meds    F/u with Dr Welch    Pt will obtain a copy of his last labs.  If LDL is above 70 will consider adding Zetia to current regimen    Follow up in about 6 months (around 11/4/2022).

## 2022-05-06 ENCOUNTER — IMMUNIZATION (OUTPATIENT)
Dept: PRIMARY CARE CLINIC | Facility: CLINIC | Age: 77
End: 2022-05-06
Payer: MEDICARE

## 2022-05-06 DIAGNOSIS — Z23 NEED FOR VACCINATION: Primary | ICD-10-CM

## 2022-05-06 PROCEDURE — 91305 COVID-19, MRNA, LNP-S, PF, 30 MCG/0.3 ML DOSE VACCINE (PFIZER): CPT | Mod: PBBFAC,PN

## 2022-05-11 NOTE — PROGRESS NOTES
Subjective:       Patient ID: Klever Link is a 77 y.o. male.    Chief Complaint: Follow-up     Patient is returning to discuss issues:    1. Laryngopharyngeal reflux:  He has been taking famotidine 20 mg twice daily .  [facility] 40 mg [facility].  He rarely has dysphagia.  Throat clearing phlegm uncommon.  He feels that he is doing well with respect his reflux.  2.  Hearing loss:   He  wears bileral hearing aids dispensed by the [facility] .  He is very pleased with the results.    3.  Tinnitus:  There has been no change in his tinnitus  4.  Allergic rhinitis:  Use OTC medication.  Nasal symptoms are rare.  5. BPPV, right:  He is having no symptoms whatsoever.  He is able to look up without vertigo.  He can roll over in bed without vertigo.    .    Review of Systems     Constitutional: Negative for appetite change, chills, fatigue, fever and unexpected weight loss.      HENT: Positive for hearing loss, postnasal drip, ringing in the ears, runny nose, trouble swallowing and voice change.      Eyes:  Negative for change in eyesight, eye drainage, eye itching and photophobia.     Respiratory:  Negative for cough, shortness of breath, sleep apnea, snoring and wheezing.      Cardiovascular:  Negative for chest pain, foot swelling, irregular heartbeat and swollen veins.     Gastrointestinal:  Negative for abdominal pain, acid reflux, constipation, diarrhea, heartburn and vomiting.     Genitourinary: Negative for difficulty urinating, sexual problems and frequent urination.     Musc: Negative for aching joints, aching muscles, back pain and neck pain.     Skin: Negative for rash.     Allergy: Positive for seasonal allergies. Negative for food allergies.     Endocrine: Negative for cold intolerance and heat intolerance.      Neurological:  Positive for dizziness.  Negative for headaches, light-headedness, seizures and tremors.      Hematologic: Negative for bruises/bleeds  easily and swollen glands.      Psychiatric: Negative for decreased concentration, depression, nervous/anxious and sleep disturbance.                Objective:      Physical Exam  Vitals and nursing note reviewed.   Constitutional:       General: He is awake.      Appearance: Normal appearance. He is well-developed, well-groomed and normal weight.   HENT:      Head: Normocephalic.      Salivary Glands: Right salivary gland is not diffusely enlarged. Left salivary gland is not diffusely enlarged.      Right Ear: Ear canal and external ear normal. Decreased hearing, partial cerumen impaction noted. Tympanic membrane is retracted.      Left Ear: Ear canal and external ear normal. Decreased hearing, partial cerumen impaction noted. Tympanic membrane is retracted.      Nose: Septal deviation (To the left) and rhinorrhea present. No nasal deformity or mucosal edema. Rhinorrhea is clear.      Right Turbinates: Enlarged and pale.      Left Turbinates: Enlarged and pale.      Comments:       Mouth/Throat:      Lips: Pink. No lesions.      Mouth: Mucous membranes are moist. No oral lesions.      Dentition: Has dentures ( full upper and lower dentures). No gum lesions.      Tongue: No lesions ( manual palpation of the tongue reveals no tenderness, mass or induration of the base of the tongue).      Palate: No mass and lesions.      Pharynx: Oropharynx is clear. Uvula midline. No oropharyngeal exudate.      Comments: Bimanual examination-no palpable masses or tenderness in the base of the tongue, no palpable cervical adenopathy                         [place]-Hallpike maneuver:  Positive for BPPV on the right  Eyes:      General: Lids are normal.         Right eye: No discharge.         Left eye: No discharge.      Conjunctiva/sclera: Conjunctivae normal.      Right eye: Right conjunctiva is not injected.      Left eye: Left conjunctiva is not injected.      Pupils: Pupils are equal, round, and reactive to light.   Neck:       Thyroid: No thyroid mass or thyromegaly.      Trachea: Trachea normal. No tracheal deviation.   Cardiovascular:      Rate and Rhythm: Normal rate and regular rhythm.      Pulses: Normal pulses.      Heart sounds: Normal heart sounds. No murmur heard.   No gallop.    Pulmonary:      Effort: Pulmonary effort is normal.      Breath sounds: Normal breath sounds. No decreased breath sounds, wheezing, rhonchi or rales.   Abdominal:      General: Bowel sounds are normal.      Palpations: Abdomen is soft.      Tenderness: There is no abdominal tenderness.   Musculoskeletal:      Right shoulder: Normal.      Cervical back: Neck supple. Decreased range of motion.   Lymphadenopathy:      Head:      Right side of head: No submental, submandibular or occipital adenopathy.      Left side of head: No submental, submandibular or occipital adenopathy.      Cervical: No cervical adenopathy.   Skin:     General: Skin is warm and dry.      Findings: No rash.      Nails: There is no clubbing.   Neurological:      Mental Status: He is alert and oriented to person, place, and time.      Cranial Nerves: No cranial nerve deficit.      Sensory: No sensory deficit.      Gait: Gait normal.   Psychiatric:         Speech: Speech normal.         Behavior: Behavior normal. Behavior is cooperative.         Assessment:       1. Laryngopharyngeal reflux (LPR)    2. Dysphagia, unspecified type    3. BPPV (benign paroxysmal positional vertigo), right    4. Sensorineural hearing loss (SNHL) of both ears    5. Hearing aid worn    6. Nasal septal deviation    7. Tinnitus of both ears        Plan:       I will have the patient continue with his current drug regimen.  I will recheck him in 3 months, or sooner on an as-needed basis.  He will need undergo flexible nasolaryngoscopy at that visit.

## 2022-05-12 ENCOUNTER — OFFICE VISIT (OUTPATIENT)
Dept: OTOLARYNGOLOGY | Facility: CLINIC | Age: 77
End: 2022-05-12
Payer: MEDICARE

## 2022-05-12 VITALS
WEIGHT: 182.13 LBS | HEART RATE: 67 BPM | SYSTOLIC BLOOD PRESSURE: 150 MMHG | TEMPERATURE: 98 F | BODY MASS INDEX: 22.76 KG/M2 | DIASTOLIC BLOOD PRESSURE: 78 MMHG

## 2022-05-12 DIAGNOSIS — H90.3 SENSORINEURAL HEARING LOSS (SNHL) OF BOTH EARS: ICD-10-CM

## 2022-05-12 DIAGNOSIS — H93.13 TINNITUS OF BOTH EARS: ICD-10-CM

## 2022-05-12 DIAGNOSIS — R13.10 DYSPHAGIA, UNSPECIFIED TYPE: ICD-10-CM

## 2022-05-12 DIAGNOSIS — J34.2 NASAL SEPTAL DEVIATION: ICD-10-CM

## 2022-05-12 DIAGNOSIS — H81.11 BPPV (BENIGN PAROXYSMAL POSITIONAL VERTIGO), RIGHT: ICD-10-CM

## 2022-05-12 DIAGNOSIS — Z97.4 HEARING AID WORN: ICD-10-CM

## 2022-05-12 DIAGNOSIS — K21.9 LARYNGOPHARYNGEAL REFLUX (LPR): Primary | ICD-10-CM

## 2022-05-12 PROCEDURE — 99999 PR PBB SHADOW E&M-EST. PATIENT-LVL III: CPT | Mod: PBBFAC,,, | Performed by: SPECIALIST

## 2022-05-12 PROCEDURE — 99213 OFFICE O/P EST LOW 20 MIN: CPT | Mod: PBBFAC,PN | Performed by: SPECIALIST

## 2022-05-12 PROCEDURE — 99999 PR PBB SHADOW E&M-EST. PATIENT-LVL III: ICD-10-PCS | Mod: PBBFAC,,, | Performed by: SPECIALIST

## 2022-05-12 PROCEDURE — 99213 PR OFFICE/OUTPT VISIT, EST, LEVL III, 20-29 MIN: ICD-10-PCS | Mod: S$PBB,,, | Performed by: SPECIALIST

## 2022-05-12 PROCEDURE — 99213 OFFICE O/P EST LOW 20 MIN: CPT | Mod: S$PBB,,, | Performed by: SPECIALIST

## 2022-05-31 ENCOUNTER — OFFICE VISIT (OUTPATIENT)
Dept: PODIATRY | Facility: CLINIC | Age: 77
End: 2022-05-31
Payer: MEDICARE

## 2022-05-31 VITALS
DIASTOLIC BLOOD PRESSURE: 71 MMHG | WEIGHT: 182.13 LBS | HEART RATE: 76 BPM | SYSTOLIC BLOOD PRESSURE: 152 MMHG | BODY MASS INDEX: 22.64 KG/M2 | HEIGHT: 75 IN

## 2022-05-31 DIAGNOSIS — B35.1 ONYCHOMYCOSIS DUE TO DERMATOPHYTE: ICD-10-CM

## 2022-05-31 DIAGNOSIS — E11.49 TYPE II DIABETES MELLITUS WITH NEUROLOGICAL MANIFESTATIONS: Primary | ICD-10-CM

## 2022-05-31 PROCEDURE — 99499 UNLISTED E&M SERVICE: CPT | Mod: S$PBB,,, | Performed by: PODIATRIST

## 2022-05-31 PROCEDURE — 11721 DEBRIDE NAIL 6 OR MORE: CPT | Mod: Q9,PBBFAC | Performed by: PODIATRIST

## 2022-05-31 PROCEDURE — 99999 PR PBB SHADOW E&M-EST. PATIENT-LVL III: CPT | Mod: PBBFAC,,, | Performed by: PODIATRIST

## 2022-05-31 PROCEDURE — 11721 PR DEBRIDEMENT OF NAILS, 6 OR MORE: ICD-10-PCS | Mod: Q9,S$PBB,, | Performed by: PODIATRIST

## 2022-05-31 PROCEDURE — 99213 OFFICE O/P EST LOW 20 MIN: CPT | Mod: PBBFAC | Performed by: PODIATRIST

## 2022-05-31 PROCEDURE — 99999 PR PBB SHADOW E&M-EST. PATIENT-LVL III: ICD-10-PCS | Mod: PBBFAC,,, | Performed by: PODIATRIST

## 2022-05-31 PROCEDURE — 99499 NO LOS: ICD-10-PCS | Mod: S$PBB,,, | Performed by: PODIATRIST

## 2022-05-31 PROCEDURE — 11721 DEBRIDE NAIL 6 OR MORE: CPT | Mod: Q9,S$PBB,, | Performed by: PODIATRIST

## 2022-05-31 NOTE — PROGRESS NOTES
Subjective:      Patient ID: Klever Link is a 77 y.o. male.    Chief Complaint: Nail Care (PCP - Petrona Welch MD - 3/2/22)    Klever is a 77 y.o. male who presents to the clinic for evaluation and treatment of high risk feet. Klever has a past medical history of Diabetes mellitus, Expressive aphasia (2/12/2015), Hyperlipidemia, Hypertension, Stroke, and TIA (transient ischemic attack) (02/2019). The patient's chief complaint is long, thick toenails. This patient has documented high risk feet requiring routine maintenance secondary to diabetes mellitis and those secondary complications of diabetes, as mentioned..    PCP: Petrona Welch MD    Date Last Seen by PCP:   Chief Complaint   Patient presents with    Nail Care     PCP - Petrona Welch MD - 3/2/22           Hemoglobin A1C   Date Value Ref Range Status   02/20/2019 7.6 (H) 4.0 - 5.6 % Final     Comment:     ADA Screening Guidelines:  5.7-6.4%  Consistent with prediabetes  >or=6.5%  Consistent with diabetes  High levels of fetal hemoglobin interfere with the HbA1C  assay. Heterozygous hemoglobin variants (HbS, HgC, etc)do  not significantly interfere with this assay.   However, presence of multiple variants may affect accuracy.     02/12/2015 13.5 (H) 4.5 - 6.2 % Final       Review of Systems   Constitutional: Negative for chills, decreased appetite and fever.   Cardiovascular: Negative for leg swelling.   Skin: Positive for nail changes. Negative for dry skin, flushing and itching.   Musculoskeletal: Negative for arthritis, joint pain, joint swelling and myalgias.   Gastrointestinal: Negative for nausea and vomiting.   Neurological: Negative for loss of balance, numbness and paresthesias.           Objective:      Physical Exam  Constitutional:       Appearance: He is well-developed.   Cardiovascular:      Comments: Dorsalis pedis and posterior tibial pulses are decreased Bilaterally. Toes are cool to touch. Feet are warm  proximally.There is decreased digital hair. Skin is atrophic, slightly hyperpigmented, and mildly edematous      Musculoskeletal:         General: No tenderness. Normal range of motion.      Comments: Adequate joint range of motion without pain, limitation, nor crepitation Bilateral feet and ankle joints. Muscle strength is 5/5 in all groups bilaterally.         Skin:     General: Skin is warm and dry.      Coloration: Skin is not ashen or jaundiced.      Findings: No ecchymosis, erythema or lesion.      Comments: Nails x10 are elongated by  2-7mm's, thickened by 3-7 mm's, dystrophic, and are darkened in  coloration .  No open lesions noted.     Neurological:      Mental Status: He is alert and oriented to person, place, and time.      Comments: [place]-Alex 5.07 monofilamant testing is diminished Dheeraj feet. Sharp/dull sensation diminished Bilaterally. Light touch absent Bilaterally.       Psychiatric:         Behavior: Behavior normal.               Assessment:       Encounter Diagnoses   Name Primary?    Type II diabetes mellitus with neurological manifestations Yes    Onychomycosis due to dermatophyte          Plan:       Klever was seen today for nail care.    Diagnoses and all orders for this visit:    Type II diabetes mellitus with neurological manifestations    Onychomycosis due to dermatophyte      I counseled the patient on his conditions, their implications and medical management.        - Shoe inspection. Diabetic Foot Education. Patient reminded of the importance of good nutrition and blood sugar control to help prevent podiatric complications of diabetes. Patient instructed on proper foot hygeine. We discussed wearing proper shoe gear, daily foot inspections, never walking without protective shoe gear, never putting sharp instruments to feet, routine podiatric nail visits every 2-3 months.      - With patient's permission, nails were aggressively reduced and debrided x 10 to their soft tissue  attachment mechanically and with electric , removing all offending nail and debris. Patient relates relief following the procedure. He will continue to monitor the areas daily, inspect his feet, wear protective shoe gear when ambulatory, moisturizer to maintain skin integrity and follow in this office in approximately 2-3 months, sooner p.r.n.

## 2022-08-01 ENCOUNTER — HOSPITAL ENCOUNTER (OUTPATIENT)
Dept: RADIOLOGY | Facility: OTHER | Age: 77
Discharge: HOME | End: 2022-08-01
Attending: INTERNAL MEDICINE
Payer: MEDICARE

## 2022-08-01 DIAGNOSIS — R91.1 PULMONARY NODULE: ICD-10-CM

## 2022-08-01 PROCEDURE — 71250 CT CHEST WITHOUT CONTRAST: ICD-10-PCS | Mod: 26,,, | Performed by: RADIOLOGY

## 2022-08-01 PROCEDURE — 71250 CT THORAX DX C-: CPT | Mod: TC

## 2022-08-01 PROCEDURE — 71250 CT THORAX DX C-: CPT | Mod: 26,,, | Performed by: RADIOLOGY

## 2022-09-06 ENCOUNTER — OFFICE VISIT (OUTPATIENT)
Dept: PODIATRY | Facility: CLINIC | Age: 77
End: 2022-09-06
Payer: MEDICARE

## 2022-09-06 VITALS
DIASTOLIC BLOOD PRESSURE: 90 MMHG | BODY MASS INDEX: 22.64 KG/M2 | SYSTOLIC BLOOD PRESSURE: 159 MMHG | HEIGHT: 75 IN | HEART RATE: 76 BPM | WEIGHT: 182.13 LBS

## 2022-09-06 DIAGNOSIS — E11.49 TYPE II DIABETES MELLITUS WITH NEUROLOGICAL MANIFESTATIONS: Primary | ICD-10-CM

## 2022-09-06 DIAGNOSIS — M79.89 SWELLING, LIMB: ICD-10-CM

## 2022-09-06 DIAGNOSIS — B35.1 ONYCHOMYCOSIS DUE TO DERMATOPHYTE: ICD-10-CM

## 2022-09-06 PROCEDURE — 11721 DEBRIDE NAIL 6 OR MORE: CPT | Mod: PBBFAC | Performed by: PODIATRIST

## 2022-09-06 PROCEDURE — 99213 OFFICE O/P EST LOW 20 MIN: CPT | Mod: PBBFAC | Performed by: PODIATRIST

## 2022-09-06 PROCEDURE — 11721 DEBRIDE NAIL 6 OR MORE: CPT | Mod: Q9,S$PBB,, | Performed by: PODIATRIST

## 2022-09-06 PROCEDURE — 99499 NO LOS: ICD-10-PCS | Mod: S$PBB,,, | Performed by: PODIATRIST

## 2022-09-06 PROCEDURE — 99999 PR PBB SHADOW E&M-EST. PATIENT-LVL III: CPT | Mod: PBBFAC,,, | Performed by: PODIATRIST

## 2022-09-06 PROCEDURE — 11721 PR DEBRIDEMENT OF NAILS, 6 OR MORE: ICD-10-PCS | Mod: Q9,S$PBB,, | Performed by: PODIATRIST

## 2022-09-06 PROCEDURE — 99499 UNLISTED E&M SERVICE: CPT | Mod: S$PBB,,, | Performed by: PODIATRIST

## 2022-09-06 PROCEDURE — 99999 PR PBB SHADOW E&M-EST. PATIENT-LVL III: ICD-10-PCS | Mod: PBBFAC,,, | Performed by: PODIATRIST

## 2022-09-06 RX ORDER — BLOOD-GLUCOSE METER
KIT MISCELLANEOUS
COMMUNITY
Start: 2022-07-08

## 2022-09-06 NOTE — PROGRESS NOTES
Subjective:      Patient ID: Klever Link is a 77 y.o. male.    Chief Complaint: Diabetic Foot Exam (Petrona Welch MD 7/21/22)    Klever is a 77 y.o. male who presents to the clinic for evaluation and treatment of high risk feet. Klever has a past medical history of Diabetes mellitus, Expressive aphasia (2/12/2015), Hyperlipidemia, Hypertension, Stroke, and TIA (transient ischemic attack) (02/2019). The patient's chief complaint is long, thick toenails. This patient has documented high risk feet requiring routine maintenance secondary to diabetes mellitis and those secondary complications of diabetes, as mentioned.. also notes swelling L leg. Has not followed diet recently     PCP: Petrona Welch MD    Date Last Seen by PCP:   Chief Complaint   Patient presents with    Diabetic Foot Exam     Petrona Welch MD 7/21/22         Hemoglobin A1C   Date Value Ref Range Status   02/20/2019 7.6 (H) 4.0 - 5.6 % Final     Comment:     ADA Screening Guidelines:  5.7-6.4%  Consistent with prediabetes  >or=6.5%  Consistent with diabetes  High levels of fetal hemoglobin interfere with the HbA1C  assay. Heterozygous hemoglobin variants (HbS, HgC, etc)do  not significantly interfere with this assay.   However, presence of multiple variants may affect accuracy.     02/12/2015 13.5 (H) 4.5 - 6.2 % Final       Review of Systems   Constitutional: Negative for chills, decreased appetite and fever.   Cardiovascular:  Positive for leg swelling (LLE).   Skin:  Positive for nail changes.   Musculoskeletal:  Negative for arthritis, back pain, joint pain, joint swelling and myalgias.   Gastrointestinal:  Negative for nausea and vomiting.   Neurological:  Positive for sensory change. Negative for loss of balance, numbness and paresthesias.         Objective:      Physical Exam  Constitutional:       Appearance: He is well-developed.   Cardiovascular:      Comments: Dorsalis pedis and posterior tibial pulses are diminished  Bilaterally. Toes are cool to touch. Feet are warm proximally.There is decreased digital hair. Skin is atrophic, slightly hyperpigmented, and mildly edematous      Musculoskeletal:         General: Swelling (mild LLE) present. No tenderness. Normal range of motion.      Comments: Adequate joint range of motion without pain, limitation, nor crepitation Bilateral feet and ankle joints. Muscle strength is 5/5 in all groups bilaterally.         Skin:     General: Skin is warm and dry.      Findings: No ecchymosis, erythema or lesion.      Comments: Nails x 10  are elongated by  2-5mm's, thickened by 2-6 mm's, dystrophic, and are darkened in  coloration . Xerosis Bilaterally. No open lesions noted.       Neurological:      Mental Status: He is alert and oriented to person, place, and time.      Comments: [place]-Alex 5.07 monofilamant testing is diminished Dheeraj feet. Sharp/dull sensation diminished Bilaterally. Light touch absent Bilaterally.       Psychiatric:         Behavior: Behavior normal.           Assessment:       Encounter Diagnoses   Name Primary?    Type II diabetes mellitus with neurological manifestations Yes    Onychomycosis due to dermatophyte     Swelling, limb          Plan:       Klever was seen today for diabetic foot exam.    Diagnoses and all orders for this visit:    Type II diabetes mellitus with neurological manifestations    Onychomycosis due to dermatophyte    Swelling, limb      I counseled the patient on his conditions, their implications and medical management.        .   - Shoe inspection. Diabetic Foot Education. Patient reminded of the importance of good nutrition and blood sugar control to help prevent podiatric complications of diabetes. Patient instructed on proper foot hygeine. We discussed wearing proper shoe gear, daily foot inspections, never walking without protective shoe gear, never putting sharp instruments to feet, routine podiatric nail visits every 2-3 months.      - With  patient's permission, nails were aggressively reduced and debrided x 10 to their soft tissue attachment mechanically and with electric , removing all offending nail and debris. Patient relates relief following the procedure. He will continue to monitor the areas daily, inspect his feet, wear protective shoe gear when ambulatory, moisturizer to maintain skin integrity and follow in this office in approximately 2-3 months, sooner p.r.n.    - Discussed etiology of swelling at length. Discussed treatment options including but not limited to :  low sodium diet, elevation of limbs, and compression stockings. Pt will elevate limbs daily. Contact if no improvement

## 2022-10-17 ENCOUNTER — OFFICE VISIT (OUTPATIENT)
Dept: CARDIOLOGY | Facility: CLINIC | Age: 77
End: 2022-10-17
Payer: MEDICARE

## 2022-10-17 VITALS
WEIGHT: 184 LBS | DIASTOLIC BLOOD PRESSURE: 70 MMHG | HEART RATE: 74 BPM | SYSTOLIC BLOOD PRESSURE: 130 MMHG | OXYGEN SATURATION: 98 % | BODY MASS INDEX: 22.88 KG/M2 | HEIGHT: 75 IN

## 2022-10-17 DIAGNOSIS — I49.1 PREMATURE ATRIAL CONTRACTIONS: ICD-10-CM

## 2022-10-17 DIAGNOSIS — Z86.73 HISTORY OF STROKE WITHOUT RESIDUAL DEFICITS: Primary | ICD-10-CM

## 2022-10-17 DIAGNOSIS — E78.2 MIXED HYPERLIPIDEMIA: ICD-10-CM

## 2022-10-17 DIAGNOSIS — E11.9 TYPE 2 DIABETES MELLITUS WITHOUT COMPLICATION, WITHOUT LONG-TERM CURRENT USE OF INSULIN: ICD-10-CM

## 2022-10-17 DIAGNOSIS — I49.3 PVCS (PREMATURE VENTRICULAR CONTRACTIONS): ICD-10-CM

## 2022-10-17 DIAGNOSIS — I10 PRIMARY HYPERTENSION: ICD-10-CM

## 2022-10-17 PROCEDURE — 93010 ELECTROCARDIOGRAM REPORT: CPT | Mod: S$PBB,,, | Performed by: INTERNAL MEDICINE

## 2022-10-17 PROCEDURE — 99999 PR PBB SHADOW E&M-EST. PATIENT-LVL III: ICD-10-PCS | Mod: PBBFAC,,, | Performed by: INTERNAL MEDICINE

## 2022-10-17 PROCEDURE — 99213 PR OFFICE/OUTPT VISIT, EST, LEVL III, 20-29 MIN: ICD-10-PCS | Mod: S$PBB,25,, | Performed by: INTERNAL MEDICINE

## 2022-10-17 PROCEDURE — 99213 OFFICE O/P EST LOW 20 MIN: CPT | Mod: S$PBB,25,, | Performed by: INTERNAL MEDICINE

## 2022-10-17 PROCEDURE — 99999 PR PBB SHADOW E&M-EST. PATIENT-LVL III: CPT | Mod: PBBFAC,,, | Performed by: INTERNAL MEDICINE

## 2022-10-17 PROCEDURE — 93010 EKG 12-LEAD: ICD-10-PCS | Mod: S$PBB,,, | Performed by: INTERNAL MEDICINE

## 2022-10-17 PROCEDURE — 93005 ELECTROCARDIOGRAM TRACING: CPT | Mod: PBBFAC,PN | Performed by: INTERNAL MEDICINE

## 2022-10-17 PROCEDURE — 99213 OFFICE O/P EST LOW 20 MIN: CPT | Mod: PBBFAC,PN | Performed by: INTERNAL MEDICINE

## 2022-10-17 NOTE — PROGRESS NOTES
Subjective:      Patient ID: Klever Link is a 77 y.o. male.    Chief Complaint: Follow-up and Hypertension    HPI:  Walks a half mile regularly.    Does [employer].    Rides bike.     Mows the lawn    Review of Systems   Cardiovascular:  Negative for chest pain, claudication, dyspnea on exertion, irregular heartbeat, leg swelling, near-syncope, orthopnea, palpitations and syncope.      Past Medical History:   Diagnosis Date    Diabetes mellitus     Expressive aphasia 2015    Hyperlipidemia     Hypertension     Stroke     TIA (transient ischemic attack) 2019        Past Surgical History:   Procedure Laterality Date    KNEE SURGERY Bilateral        Family History   Problem Relation Age of Onset    Diabetes Mother     Ovarian cancer Mother     Hypertension Father     Stroke Father     Diabetes Sister     Diabetes Sister     Diabetes Sister     Stroke Sister        Social History     Socioeconomic History    Marital status:    Tobacco Use    Smoking status: Former     Packs/day: 1.00     Years: 0.00     Pack years: 0.00     Types: Cigarettes     Start date: 1965     Quit date: 2008     Years since quittin.2    Smokeless tobacco: Never   Substance and Sexual Activity    Alcohol use: Not Currently    Drug use: No    Sexual activity: Yes     Partners: Female       Current Outpatient Medications on File Prior to Visit   Medication Sig Dispense Refill    ASPIR-LOW 81 mg EC tablet       atorvastatin (LIPITOR) 80 MG tablet Take 1 tablet by mouth once daily.      AZOPT 1 % ophthalmic suspension Place into both eyes 3 (three) times daily.      blood sugar diagnostic Strp 1 strip by Misc.(Non-Drug; Combo Route) route 3 (three) times daily. 100 strip 1    FREESTYLE FREEDOM LITE kit       FREESTYLE LANCETS 28 gauge lancets       hydrochlorothiazide (HYDRODIURIL) 25 MG tablet Take 25 mg by mouth once daily.      lisinopril (PRINIVIL,ZESTRIL) 20 MG tablet Take 20 mg by mouth once daily.    "   LUMIGAN 0.01 % Drop every evening.      metFORMIN (GLUCOPHAGE) 500 MG tablet Take 1 tablet by mouth once daily.      vit A/vit C/vit E/zinc/copper (PRESERVISION AREDS ORAL) Take by mouth 2 (two) times a day.      [DISCONTINUED] famotidine (PEPCID) 20 MG tablet Take 1 tablet (20 mg total) by mouth 2 (two) times daily. 180 tablet 3    [DISCONTINUED] ketoconazole (NIZORAL) 2 % cream Apply topically once daily. 60 g 3    [DISCONTINUED] loperamide (IMODIUM) 2 mg capsule Imodium: 4 mg (2 capsules), followed by 2 mg (1 capsule) after each loose stool; maximum: MAX 16 mg/day (8 capsules). Limit use to =48 hours 16 capsule 0     No current facility-administered medications on file prior to visit.       Review of patient's allergies indicates:  No Known Allergies  Objective:     Vitals:    10/17/22 0729 10/17/22 0753   BP: (!) 143/91 130/70   BP Location: Right arm Right arm   Patient Position: Sitting Sitting   BP Method: Large (Automatic)    Pulse: 74    SpO2: 98%    Weight: 83.4 kg (183 lb 15.6 oz)    Height: 6' 3" (1.905 m)         Physical Exam  Vitals reviewed.   Constitutional:       General: He is not in acute distress.     Appearance: He is well-developed. He is not diaphoretic.   Eyes:      General: No scleral icterus.  Neck:      Vascular: No carotid bruit or JVD.   Cardiovascular:      Rate and Rhythm: Regular rhythm.      Heart sounds: Normal heart sounds. No murmur heard.    No friction rub. No gallop.   Pulmonary:      Effort: Pulmonary effort is normal. No respiratory distress.      Breath sounds: Normal breath sounds.   Musculoskeletal:      Right lower leg: No edema.      Left lower leg: No edema.   Skin:     General: Skin is warm and dry.   Neurological:      Mental Status: He is alert and oriented to person, place, and time.   Psychiatric:         Behavior: Behavior normal.         Thought Content: Thought content normal.         Judgment: Judgment normal.      ECG today: NSR with frequent PVC's, " otherwise WNL      Office Visit on 05/02/2022   Component Date Value Ref Range Status    POC Molecular Influenza A Ag 05/02/2022 Negative  Negative, Not Reported Final    POC Molecular Influenza B Ag 05/02/2022 Negative  Negative, Not Reported Final     Acceptable 05/02/2022 Yes   Final   (Next appt: 05/12/2022 at 08:20 AM in Otolaryngology (BERTIN Dahl MD)    Dx: Stenosis of carotid artery, unspecifi...   0 Result Notes    Details    Reading Physician Reading Date Result Priority   Milla Smith MD  [phone]  [phone] 12/8/2021 Routine     Narrative & Impression  EXAMINATION:  US CAROTID BILATERAL     CLINICAL HISTORY:  Occlusion and stenosis of unspecified carotid artery     TECHNIQUE:  Grayscale and color Doppler ultrasound examination of the carotid and vertebral artery systems bilaterally.  Stenosis estimates are per the NASCET measurement criteria.     COMPARISON:  11/20/2020     FINDINGS:  Right:     Internal Carotid Artery (ICA) peak systolic velocity 73 cm/sec     ICA/CCA peak systolic velocity ratio: 1.5     Plaque formation: Moderate heterogeneous at the bifurcation     Vertebral artery: Antegrade flow and normal waveform.     Left:     Internal Carotid Artery (ICA)  peak systolic velocity 105 cm/sec     ICA/CCA peak systolic velocity ratio: 2.9     Plaque formation: Moderate heterogeneous at the bifurcation.  There appears to be additional plaque along the distal internal carotid artery.     Vertebral artery: Antegrade flow and normal waveform.     Impression:     Overall, stable exam compared to previous.  Persistent moderate plaque at the carotid artery bifurcation bilaterally with approximately 50% narrowing on the left.        Electronically signed by: Milla Smith  Date:                                            12/08/2021  Time:                                           13:50             Exam Ended: 12/08/21 09:46             Details    Reading Physician Reading  Date Result Priority   Dale Vázquez MD  [phone]  [phone] 12/5/2020 STAT     Narrative & Impression  EXAMINATION:  CTA HEAD AND NECK (XPD)     CLINICAL HISTORY:  Carotid or vertebral dissection suspected;Dizziness, non-specific;     TECHNIQUE:  Non contrast low dose axial images were obtained through the head.  CT angiogram was performed from the level of the sarah to the top of the head following the IV administration of 100mL of Omnipaque 350.   Sagittal and coronal reconstructions and maximum intensity projection reconstructions were performed. Arterial stenosis percentages are based on NASCET measurement criteria.     COMPARISON:  CTA head neck performed 02/19/2019.  MRI brain performed 12/05/2020.     FINDINGS:  CT HEAD:     Blood: No acute intracranial hemorrhage.     Parenchyma: No definite loss of gray-white differentiation to suggest acute or subacute transcortical infarct. Generalized age-related parenchymal volume loss is noted.  Nonspecific hypoattenuation in the white matter suggests sequela of chronic small vessel ischemic disease.  Coarse vascular calcification in the left frontal region corresponding to a distal MCA branch.     Ventricles/Extra-axial spaces: No abnormal extra-axial fluid collection. Basal cisterns are patent.     Vessels: Calcific atherosclerosis     Orbits: Unremarkable.     Scalp: Unremarkable.     Skull: There are no depressed skull fractures or destructive bone lesions.     Sinuses and mastoids: Relatively minor mucosal thickening in a suggest retention cyst in the left maxillary sinus.     Other findings: On min delayed postcontrast imaging, no pathologic intracranial enhancement is seen.  Venous structures appear patent grossly.     CTA:     NECK:     Imaged aortic arch: There appears to be anatomic variant short segment common origin of the brachiocephalic and left common carotid arteries.  Calcified and noncalcified atheromatous plaque is noted involving the  aorta and great vessel origins.  Mild atheromatous narrowing of the proximal right subclavian artery.     Right common and cervical internal carotid arteries: Calcified and noncalcified atheromatous plaque results in less than 50% stenosis of the carotid bifurcation and cervical ICA.     Left common and cervical internal carotid arteries: Similar moderate narrowing of the mid common carotid artery which appears to be on the basis of predominantly fibrofatty plaque when compared to the 02/19/2019 CTA.  Similar appearance of multifocal moderate narrowing of the cervical internal carotid artery related to calcified and noncalcified atheromatous plaque, resulting approximately 50% stenosis using NASCET style measurements.     Right cervical vertebral artery: The right vertebral artery appears diminutive in caliber on a developmental basis.  There is superimposed mild in mild-to-moderate narrowing most pronounced involving the V1 segment.  The remainder of the vessel appears patent.  This appearance is unchanged.     Left cervical vertebral artery: Artery scattered multifocal mild atheromatous narrowing without flow-limiting stenosis suggested.     HEAD:     Right anterior circulation:Multifocal moderate atheromatous narrowing of the cavernous and supraclinoid ICA.  No significant stenosis of the MARTHA or MCA.  Right ophthalmic artery is patent.There is a saccular outpouching arising at the distal supraclinoid ICA, distal to the posterior communicating artery aneurysm, possibly at the origin of the anterior choroidal artery, measuring approximately 2.9 x 2.2 by 3.6 mm (AP by TV by cc), with an approximately 2.2 mm neck.     Left anterior circulation: Multifocal moderate atheromatous narrowing involving the cavernous and supraclinoid ICA.  No significant stenosis of the MARTHA or MCA.  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     Posterior circulation: There is no hemodynamically significant vertebrobasilar stenosis.  Mild atheromatous narrowing of bilateral V4 vertebral artery segments.  Overall there is diminutive caliber of the vertebrobasilar system again noted, presumably developmental related to prominent bilateral posterior communicating arteries.  There is no significant PCA stenosis. Posterior inferior cerebellar arteries patent at origin.     Anterior and posterior communicating arteries: The anterior and posterior communicating arteries are visualized.     NON-ANGIOGRAPHIC FINDINGS:     Visualized intracranial contents: As above     Soft tissues of the neck:     There is an ill-defined lobular centrally low attenuation, peripherally enhancing lesions identified at the tongue base, crossing midline, but eccentric to the left and extending into the left the locule a. this lesion overall measures approximately 1.5 x 1.5 x 1.8 cm (axial series 3, image 314; sagittal reformat series 604, image 136).  No convincing cervical adenopathy is noted.     Visualized sinuses: As above     Dentition: Edentulous     Spine: Extensive degenerative findings in the cervical spine appear relatively similar to the 02/19/2019 CT.     Visualized lungs: Motion compromised examination.  Emphysematous changes are noted.  Calcified granuloma is noted in the right upper lobe.  Some areas of subsegmental atelectasis are additionally noted dependently.     Impression:     1. CT head without definite acute intracranial findings.  2. CTA head neck without evidence of acute large vessel occlusion or flow-limiting stenosis.  Cervical and intracranial atherosclerotic disease and associated vessel narrowing without substantial interval change relative to prior CTA head neck examination, 02/19/2019.  Details as per report body.  3. 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   This may be minimally increased in mediolateral dimension when compared to prior MRA of 02/12/2015.  4. Peripherally enhancing cystic appearing lesion centered at the tongue base extending into the left vallecula.  While this may potentially represent a benign vallecular cyst, the possibility of a mucosal based neoplasm is not excluded.  As such, correlation with direct visualization and possible referral to otolaryngology with consideration of tissue sampling would be recommended to exclude malignancy.  5. Additional details, as per report body.  This report was flagged in Epic as abnormal.        Electronically signed by: Dale Vázquez  Date:                                            12/05/2020  Time:                                           10:45             Exam Ended: 12/05/20 10:14 Last Resulted: 12/05/20 10:45            Accession #: [id number]    Transthoracic echo (TTE) complete  Order# [id number]  Reading physicians: Petrona Frank MD; Mejia Goodrich III, MD Ordering physician: Adrienne Shahid NP Study date: 2/21/19       Reason for Exam  Priority: Routine  Dx: Stroke [I63.9 (ICD-10-CM)]   Comments: To investigate for wall motion abnormalities     Result Image Hyperlink     Show images for Transthoracic echo (TTE) complete (Cupid Only)    Summary    Normal left ventricular systolic function. The estimated ejection fraction is 55%  No wall motion abnormalities.  Normal LV diastolic function.  Normal right ventricular systolic function.  Normal central venous pressure (3 mm Hg).         Vitals    Height   Accession #: [id number]    Klever Link  Cardiac event monitor  Order# [id number]  Reading physician: Enrique Mora MD Ordering physician: Jose Will NP Study date: 2/21/19       Reason for Exam  Priority: Routine  Dx: TIA (transient ischemic attack) [G45.9 (ICD-10-CM)]         Conclusion    Negative event monitor with no clinical arrhythmias.  No atrial arrhythmia observed          Performing Clinician    Ernesto Appiah   Ref Range & Units1 yr ago   (12/5/20)3 yr ago   (2/21/19)3 yr ago   (2/20/19)3 yr ago   (2/19/19)7 yr ago   (2/12/15) WBC3.90 - 12.70 K/uL6.02 5.45 6.54 5.78 5.54 RBC4.60 - 6.20 M/uL5.70 5.68 5.72 5.76 6.04 Esjtmoibhm88.0 - 18.0 g/dL15.2 15.8 15.8 15.8 16.7 Ivckemszcn55.0 - 54.0 %49.7 48.8 50.7 50.1 49.7 MCV82 - 98 fL87 86 89 87 82 MCH27.0 - 31.0 pg26.7 Low  27.8 27.6 27.4 27.6 MCHC32.0 - 36.0 g/dL30.6 Low  32.4 31.2 Low  31.5 Low  33.6 R RDW11.5 - 14.5 %14.6 High  14.6 High  14.8 High  14.8 High  13.9 Jmpzklwed744 - 350 K/uL204 201 200 212 209 MPV9.2 - 12.9 fL9.8 9.8 9.9 10.5 11.0 Immature Granulocytes 137 137 137 139 139 133 Low  Potassium3.5 - 5.1 mmol/L4.3 3.8 4.3 4.8 4.3 4.1 Knusrngv89 - 110 mmol/L100 104 102 103 103 97  - 29 mmol/L26 22 Low  25 30 High  24 24 Lmqlotz48 - 110 mg/dL213 High  133 High  128 High  166 High  133 High  430 High  BUN8 - 23 mg/dL17 16 15 16 19 18 Creatinine0.5 - 1.4 mg/dL1.1 1.0 1.2 1.2 1.2 1.5 High  Calcium8.7 - 10.5 mg/dL9.8 10.2 10.8 High  10.9 High  10.0 10.4 Total Protein6.0 - 8.4 g/dL7.4 6.6 7.2 7.4 7.4 Albumin3.5 - 5.2 g/dL4.3 3.9 4.3 4.3 4.2 Total Bilirubin0.1 - 1.0 mg/dL0.5 0.6 CM 0.6 CM 0.5 CM 0.6 CM Comment: For infants and newborns, interpretation of results should be based   on gestational age, weight and in agreement with clinical   observations.   Premature Infant recommended reference ranges:   Up to 24 hours.............<8.0 mg/dL   Up to 48 hours............<12.0 mg/dL   3-5 days..................<15.0 mg/dL   6-29 days.................<15.0 mg/dL   Alkaline Rbhagfltcdg73 - 135 U/L76 69 74 74 104 AST10 - 40 U/L17 14 16 15 15 ALT10 - 44 U/L19 13 13 15 22 Anion Gap8 - 16 mmol/L11 11 10 6 Low  12 12 eGFR if African American>60 mL/min/1.73 m^2>60.0 >60.0 >60.0 >60.0 >60 54 Abnormal  eGFR if non African American>60 mL/min/1.73 m^2>60.0 >60.0 CM 59.2 Abnormal  CM 59.2 Abnormal  CM >60 CM 46 Abnormal  CM Comment:  Calculation used to obtain the estimated glomerular filtration   rate (eGFR) is the CKD-EPI equation     Assessment:     1. History of stroke without residual deficits    2. Primary hypertension    3. Mixed hyperlipidemia    4. Premature atrial contractions    5. PVCs (premature ventricular contractions)    6. Type 2 diabetes mellitus without complication, without long-term current use of insulin      Plan:   Klever was seen today for follow-up and hypertension.    Diagnoses and all orders for this visit:    History of stroke without residual deficits  -     IN OFFICE EKG 12-LEAD (to Muse)    Primary hypertension  -     IN OFFICE EKG 12-LEAD (to Muse)    Mixed hyperlipidemia  -     IN OFFICE EKG 12-LEAD (to Muse)    Premature atrial contractions  -     IN OFFICE EKG 12-LEAD (to Muse)    PVCs (premature ventricular contractions)  -     IN OFFICE EKG 12-LEAD (to Muse)    Type 2 diabetes mellitus without complication, without long-term current use of insulin  -     IN OFFICE EKG 12-LEAD (to Muse)     Pt is doing well    Same meds    F/u with Dr Welch who does labs.  Last lab done last month    F/u with AMANDA Gleason      No follow-ups on file.

## 2022-10-21 ENCOUNTER — IMMUNIZATION (OUTPATIENT)
Dept: PRIMARY CARE CLINIC | Facility: CLINIC | Age: 77
End: 2022-10-21
Payer: MEDICARE

## 2022-10-21 DIAGNOSIS — Z23 NEED FOR VACCINATION: Primary | ICD-10-CM

## 2022-10-21 PROCEDURE — 91312 COVID-19, MRNA, LNP-S, BIVALENT BOOSTER, PF, 30 MCG/0.3 ML DOSE: CPT | Mod: PBBFAC,PN

## 2022-10-21 PROCEDURE — 0124A COVID-19, MRNA, LNP-S, BIVALENT BOOSTER, PF, 30 MCG/0.3 ML DOSE: CPT | Mod: PBBFAC,PN

## 2022-10-30 NOTE — PROGRESS NOTES
Subjective:       Patient ID: Klever Link is a 77 y.o. male.    Chief Complaint: Follow-up     Patient is returning to discuss issues:    1. Laryngopharyngeal reflux:  The patient has not been taking famotidine for the last 2 months.  He has not noticed significant throat clearing, globus sensation or phlegm in the throat.    2.  Hearing loss:   He  wears bileral hearing aids dispensed by the [facility] .  He is very pleased with the results.    3.  Tinnitus:  There has been no change in his tinnitus  4.  Allergic rhinitis:  Nasal secretions are clear.  He is not having significant congestion Use OTC medication.    5. BPPV, right:  He has been  without vertigo since his last 2 visits..    .    Review of Systems     Constitutional: Negative for appetite change, chills, fatigue, fever and unexpected weight loss.      HENT: Positive for hearing loss, postnasal drip, ringing in the ears, runny nose, trouble swallowing, snoring and voice change.      Eyes:  Negative for change in eyesight, eye drainage, eye itching and photophobia.     Respiratory:  Negative for cough, shortness of breath, sleep apnea, snoring and wheezing.      Cardiovascular:  Negative for chest pain, foot swelling, irregular heartbeat and swollen veins.     Gastrointestinal:  Negative for abdominal pain, acid reflux, constipation, diarrhea, heartburn and vomiting.     Genitourinary: Negative for difficulty urinating, sexual problems and frequent urination.     Musc: Negative for aching joints, aching muscles, back pain and neck pain.     Skin: Negative for rash.     Allergy: Positive for seasonal allergies. Negative for food allergies.     Endocrine: Negative for cold intolerance and heat intolerance.      Neurological:  Positive for dizziness.  Negative for headaches, light-headedness, seizures and tremors.      Hematologic: Negative for bruises/bleeds easily and swollen glands.      Psychiatric: Negative for decreased  concentration, depression, nervous/anxious and sleep disturbance.                Objective:      Physical Exam  Vitals and nursing note reviewed.   Constitutional:       General: He is awake.      Appearance: Normal appearance. He is well-developed, well-groomed and normal weight.   HENT:      Head: Normocephalic.      Salivary Glands: Right salivary gland is not diffusely enlarged. Left salivary gland is not diffusely enlarged.      Right Ear: Ear canal and external ear normal. Decreased hearing, partial cerumen impaction noted. Tympanic membrane is retracted.      Left Ear: Ear canal and external ear normal. Decreased hearing, partial cerumen impaction noted. Tympanic membrane is retracted.      Nose: Septal deviation (To the left) and rhinorrhea present. No nasal deformity or mucosal edema. Rhinorrhea is clear.      Right Turbinates: Enlarged and pale.      Left Turbinates: Enlarged and pale.      Comments:       Mouth/Throat:      Lips: Pink. No lesions.      Mouth: Mucous membranes are moist. No oral lesions.      Dentition: Has dentures ( full upper and lower dentures). No gum lesions.      Tongue: No lesions ( manual palpation of the tongue reveals no tenderness, mass or induration of the base of the tongue).      Palate: No mass and lesions.      Pharynx: Oropharynx is clear. Uvula midline. No oropharyngeal exudate.      Comments: Bimanual examination-no palpable masses or tenderness in the base of the tongue, no palpable cervical adenopathy                         Bella-Hallpike maneuver:  Positive for BPPV on the right  Eyes:      General: Lids are normal.         Right eye: No discharge.         Left eye: No discharge.      Conjunctiva/sclera: Conjunctivae normal.      Right eye: Right conjunctiva is not injected.      Left eye: Left conjunctiva is not injected.      Pupils: Pupils are equal, round, and reactive to light.   Neck:      Thyroid: No thyroid mass or thyromegaly.      Trachea: Trachea normal. No  tracheal deviation.   Cardiovascular:      Rate and Rhythm: Normal rate and regular rhythm.      Pulses: Normal pulses.      Heart sounds: Normal heart sounds. No murmur heard.   No gallop.    Pulmonary:      Effort: Pulmonary effort is normal.      Breath sounds: Normal breath sounds. No decreased breath sounds, wheezing, rhonchi or rales.   Abdominal:      General: Bowel sounds are normal.      Palpations: Abdomen is soft.      Tenderness: There is no abdominal tenderness.   Musculoskeletal:      Right shoulder: Normal.      Cervical back: Neck supple. Decreased range of motion.   Lymphadenopathy:      Head:      Right side of head: No submental, submandibular or occipital adenopathy.      Left side of head: No submental, submandibular or occipital adenopathy.      Cervical: No cervical adenopathy.   Skin:     General: Skin is warm and dry.      Findings: No rash.      Nails: There is no clubbing.   Neurological:      Mental Status: He is alert and oriented to person, place, and time.      Cranial Nerves: No cranial nerve deficit.      Sensory: No sensory deficit.      Gait: Gait normal.   Psychiatric:         Speech: Speech normal.         Behavior: Behavior normal. Behavior is cooperative.         Assessment:       1. Laryngopharyngeal reflux (LPR)    2. Dysphagia, unspecified type    3. BPPV (benign paroxysmal positional vertigo), right    4. Sensorineural hearing loss (SNHL) of both ears    5. Hearing aid worn    6. Nasal septal deviation    7. Tinnitus of both ears        Plan:       I will recheck the patient in 3 months.  I will have him stay off of the famotidine.  If he develops throat symptoms he is to restart it.  He will need undergo a flexible nasolaryngoscopy at next visit.

## 2022-10-31 ENCOUNTER — OFFICE VISIT (OUTPATIENT)
Dept: OTOLARYNGOLOGY | Facility: CLINIC | Age: 77
End: 2022-10-31
Payer: MEDICARE

## 2022-10-31 VITALS
BODY MASS INDEX: 22.88 KG/M2 | DIASTOLIC BLOOD PRESSURE: 74 MMHG | SYSTOLIC BLOOD PRESSURE: 169 MMHG | WEIGHT: 183.06 LBS | TEMPERATURE: 97 F | HEART RATE: 80 BPM

## 2022-10-31 DIAGNOSIS — R13.10 DYSPHAGIA, UNSPECIFIED TYPE: ICD-10-CM

## 2022-10-31 DIAGNOSIS — K21.9 LARYNGOPHARYNGEAL REFLUX (LPR): Primary | ICD-10-CM

## 2022-10-31 DIAGNOSIS — H81.11 BENIGN PAROXYSMAL POSITIONAL VERTIGO OF RIGHT EAR: ICD-10-CM

## 2022-10-31 DIAGNOSIS — Z97.4 HEARING AID WORN: ICD-10-CM

## 2022-10-31 DIAGNOSIS — H93.13 TINNITUS OF BOTH EARS: ICD-10-CM

## 2022-10-31 DIAGNOSIS — H90.3 SENSORINEURAL HEARING LOSS (SNHL) OF BOTH EARS: ICD-10-CM

## 2022-10-31 PROCEDURE — 99999 PR PBB SHADOW E&M-EST. PATIENT-LVL III: ICD-10-PCS | Mod: PBBFAC,,, | Performed by: SPECIALIST

## 2022-10-31 PROCEDURE — 99214 PR OFFICE/OUTPT VISIT, EST, LEVL IV, 30-39 MIN: ICD-10-PCS | Mod: S$PBB,,, | Performed by: SPECIALIST

## 2022-10-31 PROCEDURE — 99999 PR PBB SHADOW E&M-EST. PATIENT-LVL III: CPT | Mod: PBBFAC,,, | Performed by: SPECIALIST

## 2022-10-31 PROCEDURE — 99213 OFFICE O/P EST LOW 20 MIN: CPT | Mod: PBBFAC,PN | Performed by: SPECIALIST

## 2022-10-31 PROCEDURE — 99214 OFFICE O/P EST MOD 30 MIN: CPT | Mod: S$PBB,,, | Performed by: SPECIALIST

## 2022-10-31 RX ORDER — HYDROCODONE BITARTRATE AND ACETAMINOPHEN 5; 325 MG/1; MG/1
1 TABLET ORAL EVERY 4 HOURS PRN
COMMUNITY
Start: 2022-10-20 | End: 2023-01-30

## 2022-11-09 ENCOUNTER — OFFICE VISIT (OUTPATIENT)
Dept: PODIATRY | Facility: CLINIC | Age: 77
End: 2022-11-09
Payer: MEDICARE

## 2022-11-09 VITALS
HEART RATE: 67 BPM | DIASTOLIC BLOOD PRESSURE: 83 MMHG | BODY MASS INDEX: 23.3 KG/M2 | HEIGHT: 75 IN | WEIGHT: 187.38 LBS | SYSTOLIC BLOOD PRESSURE: 166 MMHG

## 2022-11-09 DIAGNOSIS — B35.1 ONYCHOMYCOSIS DUE TO DERMATOPHYTE: ICD-10-CM

## 2022-11-09 DIAGNOSIS — E11.49 TYPE II DIABETES MELLITUS WITH NEUROLOGICAL MANIFESTATIONS: Primary | ICD-10-CM

## 2022-11-09 PROCEDURE — 99499 UNLISTED E&M SERVICE: CPT | Mod: S$PBB,,, | Performed by: PODIATRIST

## 2022-11-09 PROCEDURE — 99213 OFFICE O/P EST LOW 20 MIN: CPT | Mod: PBBFAC | Performed by: PODIATRIST

## 2022-11-09 PROCEDURE — 11721 DEBRIDE NAIL 6 OR MORE: CPT | Mod: Q9,PBBFAC | Performed by: PODIATRIST

## 2022-11-09 PROCEDURE — 99999 PR PBB SHADOW E&M-EST. PATIENT-LVL III: CPT | Mod: PBBFAC,,, | Performed by: PODIATRIST

## 2022-11-09 PROCEDURE — 11721 PR DEBRIDEMENT OF NAILS, 6 OR MORE: ICD-10-PCS | Mod: Q9,S$PBB,, | Performed by: PODIATRIST

## 2022-11-09 PROCEDURE — 11721 DEBRIDE NAIL 6 OR MORE: CPT | Mod: Q9,S$PBB,, | Performed by: PODIATRIST

## 2022-11-09 PROCEDURE — 99499 NO LOS: ICD-10-PCS | Mod: S$PBB,,, | Performed by: PODIATRIST

## 2022-11-09 PROCEDURE — 99999 PR PBB SHADOW E&M-EST. PATIENT-LVL III: ICD-10-PCS | Mod: PBBFAC,,, | Performed by: PODIATRIST

## 2022-12-09 NOTE — PROGRESS NOTES
Subjective:      Patient ID: Klever Link is a 77 y.o. male.    Chief Complaint: Toe Pain (Right big toe no pain today ) and Diabetes Mellitus (PCP - 11/29/22)    Klever is a 77 y.o. male who presents to the clinic for evaluation and treatment of high risk feet. Klever has a past medical history of Diabetes mellitus, Expressive aphasia (2/12/2015), Hyperlipidemia, Hypertension, Stroke, and TIA (transient ischemic attack) (02/2019). The patient's chief complaint is long, thick toenails. This patient has documented high risk feet requiring routine maintenance secondary to diabetes mellitis and those secondary complications of diabetes, as mentioned..     PCP: Petrona Welch MD    Date Last Seen by PCP:   Chief Complaint   Patient presents with    Toe Pain     Right big toe no pain today     Diabetes Mellitus     PCP - 11/29/22         Hemoglobin A1C   Date Value Ref Range Status   02/20/2019 7.6 (H) 4.0 - 5.6 % Final     Comment:     ADA Screening Guidelines:  5.7-6.4%  Consistent with prediabetes  >or=6.5%  Consistent with diabetes  High levels of fetal hemoglobin interfere with the HbA1C  assay. Heterozygous hemoglobin variants (HbS, HgC, etc)do  not significantly interfere with this assay.   However, presence of multiple variants may affect accuracy.     02/12/2015 13.5 (H) 4.5 - 6.2 % Final       Review of Systems   Constitutional: Negative for chills, decreased appetite and fever.   Skin:  Positive for nail changes. Negative for dry skin and flushing.   Musculoskeletal:  Negative for arthritis, back pain, joint pain, joint swelling and myalgias.   Gastrointestinal:  Negative for nausea and vomiting.   Neurological:  Positive for sensory change. Negative for loss of balance, numbness and paresthesias.         Objective:      Physical Exam  Constitutional:       Appearance: He is well-developed.   Cardiovascular:      Comments: Dorsalis pedis and posterior tibial pulses are diminished Bilaterally.  Toes are cool to touch. Feet are warm proximally.There is decreased digital hair. Skin is atrophic, slightly hyperpigmented, and mildly edematous      Musculoskeletal:         General: Swelling (mild LLE) present. No tenderness. Normal range of motion.      Comments: Adequate joint range of motion without pain, limitation, nor crepitation Bilateral feet and ankle joints. Muscle strength is 5/5 in all groups bilaterally.         Skin:     General: Skin is warm and dry.      Findings: No ecchymosis, erythema or lesion.      Comments: Nails x 10  are elongated by  2-5mm's, thickened by 2-5 mm's, dystrophic, and are darkened in  coloration . Xerosis Bilaterally. No open lesions noted.       Neurological:      Mental Status: He is alert and oriented to person, place, and time.      Comments: [place]-Alex 5.07 monofilamant testing is diminished Dheeraj feet. Sharp/dull sensation diminished Bilaterally. Light touch absent Bilaterally.       Psychiatric:         Behavior: Behavior normal.           Assessment:       Encounter Diagnoses   Name Primary?    Type II diabetes mellitus with neurological manifestations Yes    Onychomycosis due to dermatophyte          Plan:       Klever was seen today for toe pain and diabetes mellitus.    Diagnoses and all orders for this visit:    Type II diabetes mellitus with neurological manifestations    Onychomycosis due to dermatophyte    I counseled the patient on his conditions, their implications and medical management.    .   - Shoe inspection. Diabetic Foot Education. Patient reminded of the importance of good nutrition and blood sugar control to help prevent podiatric complications of diabetes. Patient instructed on proper foot hygeine. We discussed wearing proper shoe gear, daily foot inspections, never walking without protective shoe gear, never putting sharp instruments to feet, routine podiatric nail visits every 2-3 months.      - With patient's permission, nails were  aggressively reduced and debrided x 10 to their soft tissue attachment mechanically and with electric , removing all offending nail and debris. Patient relates relief following the procedure. He will continue to monitor the areas daily, inspect his feet, wear protective shoe gear when ambulatory, moisturizer to maintain skin integrity and follow in this office in approximately 2-3 months, sooner p.r.n.

## 2022-12-17 ENCOUNTER — OFFICE VISIT (OUTPATIENT)
Dept: URGENT CARE | Facility: CLINIC | Age: 77
End: 2022-12-17
Payer: MEDICARE

## 2022-12-17 VITALS
HEIGHT: 75 IN | HEART RATE: 112 BPM | WEIGHT: 187 LBS | SYSTOLIC BLOOD PRESSURE: 134 MMHG | RESPIRATION RATE: 18 BRPM | OXYGEN SATURATION: 95 % | TEMPERATURE: 98 F | DIASTOLIC BLOOD PRESSURE: 75 MMHG | BODY MASS INDEX: 23.25 KG/M2

## 2022-12-17 DIAGNOSIS — B96.89 BACTERIAL URI: ICD-10-CM

## 2022-12-17 DIAGNOSIS — J02.9 SORE THROAT: Primary | ICD-10-CM

## 2022-12-17 DIAGNOSIS — J06.9 BACTERIAL URI: ICD-10-CM

## 2022-12-17 DIAGNOSIS — R09.82 POSTNASAL DRIP: ICD-10-CM

## 2022-12-17 LAB
CTP QC/QA: YES
SARS-COV-2 RDRP RESP QL NAA+PROBE: NEGATIVE

## 2022-12-17 PROCEDURE — 99213 PR OFFICE/OUTPT VISIT, EST, LEVL III, 20-29 MIN: ICD-10-PCS | Mod: S$GLB,,, | Performed by: FAMILY MEDICINE

## 2022-12-17 PROCEDURE — 87635: ICD-10-PCS | Mod: QW,CR,S$GLB, | Performed by: FAMILY MEDICINE

## 2022-12-17 PROCEDURE — 87635 SARS-COV-2 COVID-19 AMP PRB: CPT | Mod: QW,CR,S$GLB, | Performed by: FAMILY MEDICINE

## 2022-12-17 PROCEDURE — 99213 OFFICE O/P EST LOW 20 MIN: CPT | Mod: S$GLB,,, | Performed by: FAMILY MEDICINE

## 2022-12-17 RX ORDER — CETIRIZINE HYDROCHLORIDE 10 MG/1
10 TABLET ORAL NIGHTLY
Qty: 14 TABLET | Refills: 0 | Status: SHIPPED | OUTPATIENT
Start: 2022-12-17 | End: 2023-01-30

## 2022-12-17 RX ORDER — FLUTICASONE PROPIONATE 50 MCG
2 SPRAY, SUSPENSION (ML) NASAL 2 TIMES DAILY PRN
Qty: 9.9 ML | Refills: 0 | Status: SHIPPED | OUTPATIENT
Start: 2022-12-17 | End: 2023-01-30 | Stop reason: SDUPTHER

## 2022-12-17 RX ORDER — AMOXICILLIN AND CLAVULANATE POTASSIUM 875; 125 MG/1; MG/1
1 TABLET, FILM COATED ORAL EVERY 12 HOURS
Qty: 20 TABLET | Refills: 0 | Status: SHIPPED | OUTPATIENT
Start: 2022-12-17 | End: 2022-12-27

## 2022-12-17 NOTE — PATIENT INSTRUCTIONS
Follow up with primary care provider if no improvement; may return to urgent care as needed    Seek immediate care in the emergency room in the event of severe abdominal pain, chest pain, respiratory distress, fever unresponsive to antipyretic, dehydration, loss of consciousness, seizure.

## 2023-01-29 NOTE — PROGRESS NOTES
Subjective:       Patient ID: Klever Link is a 77 y.o. male.    Chief Complaint: Follow-up     Patient is returning to discuss issues:    1. Laryngopharyngeal reflux:  The patient has been off famotidine for the last 6 months.  He is noticing phlegm in his throat every morning.  He also has had some episodes where he stops breathing and talking for a few seconds.  2. Sensorineural hearing loss:  There is no change here.  He wears hearing aids bilaterally.  3. Tinnitus:  Continues to have bilateral tinnitus.  There has been no change since last visit.    4.  Allergic rhinitis:  Nasal secretions are clear.  He has had some 3 episodes where postnasal drip triggered a reaction with his throat closing up for few seconds and inability to breathe or talk for a few seconds.  He is not having significant congestion .  He has not been using any medication for his allergies.    5. BPPV, right:   He has not had any further episodes of vertigo.  .    Review of Systems     Constitutional: Negative for appetite change, chills, fatigue, fever and unexpected weight loss.      HENT: Positive for hearing loss, postnasal drip, ringing in the ears, runny nose, trouble swallowing, snoring and voice change.      Eyes:  Negative for change in eyesight, eye drainage, eye itching and photophobia.     Respiratory:  Negative for cough, shortness of breath, sleep apnea, snoring and wheezing.      Cardiovascular:  Negative for chest pain, foot swelling, irregular heartbeat and swollen veins.     Gastrointestinal:  Negative for abdominal pain, acid reflux, constipation, diarrhea, heartburn and vomiting.     Genitourinary: Negative for difficulty urinating, sexual problems and frequent urination.     Musc: Negative for aching joints, aching muscles, back pain and neck pain.     Skin: Negative for rash.     Allergy: Positive for seasonal allergies. Negative for food allergies.     Endocrine: Negative for cold intolerance and heat  intolerance.      Neurological:  Positive for dizziness.  Negative for headaches, light-headedness, seizures and tremors.      Hematologic: Negative for bruises/bleeds easily and swollen glands.      Psychiatric: Negative for decreased concentration, depression, nervous/anxious and sleep disturbance.                Objective:      Physical Exam  Vitals and nursing note reviewed.   Constitutional:       General: He is awake.      Appearance: Normal appearance. He is well-developed, well-groomed and normal weight.   HENT:      Head: Normocephalic.      Salivary Glands: Right salivary gland is not diffusely enlarged. Left salivary gland is not diffusely enlarged.      Right Ear: Ear canal and external ear normal. Decreased hearing, partial cerumen impaction noted. Tympanic membrane is retracted.      Left Ear: Ear canal and external ear normal. Decreased hearing, partial cerumen impaction noted. Tympanic membrane is retracted.      Nose: Septal deviation (To the left) and rhinorrhea present. No nasal deformity or mucosal edema. Rhinorrhea is clear.      Right Turbinates: Enlarged and pale.      Left Turbinates: Enlarged and pale.      Comments:       Mouth/Throat:      Lips: Pink. No lesions.      Mouth: Mucous membranes are moist. No oral lesions.      Dentition: Has dentures ( full upper and lower dentures). No gum lesions.      Tongue: No lesions ( manual palpation of the tongue reveals no tenderness, mass or induration of the base of the tongue).      Palate: No mass and lesions.      Pharynx: Oropharynx is clear. Uvula midline. No oropharyngeal exudate.      Comments: Bimanual examination-no palpable masses or tenderness in the base of the tongue, no palpable cervical adenopathy                         Bella-Hallpike maneuver:  Positive for BPPV on the right  Eyes:      General: Lids are normal.         Right eye: No discharge.         Left eye: No discharge.      Conjunctiva/sclera: Conjunctivae normal.      Right  eye: Right conjunctiva is not injected.      Left eye: Left conjunctiva is not injected.      Pupils: Pupils are equal, round, and reactive to light.   Neck:      Thyroid: No thyroid mass or thyromegaly.      Trachea: Trachea normal. No tracheal deviation.   Cardiovascular:      Rate and Rhythm: Normal rate and regular rhythm.      Pulses: Normal pulses.      Heart sounds: Normal heart sounds. No murmur heard.   No gallop.    Pulmonary:      Effort: Pulmonary effort is normal.      Breath sounds: Normal breath sounds. No decreased breath sounds, wheezing, rhonchi or rales.   Abdominal:      General: Bowel sounds are normal.      Palpations: Abdomen is soft.      Tenderness: There is no abdominal tenderness.   Musculoskeletal:      Right shoulder: Normal.      Cervical back: Neck supple. Decreased range of motion.   Lymphadenopathy:      Head:      Right side of head: No submental, submandibular or occipital adenopathy.      Left side of head: No submental, submandibular or occipital adenopathy.      Cervical: No cervical adenopathy.   Skin:     General: Skin is warm and dry.      Findings: No rash.      Nails: There is no clubbing.   Neurological:      Mental Status: He is alert and oriented to person, place, and time.      Cranial Nerves: No cranial nerve deficit.      Sensory: No sensory deficit.      Gait: Gait normal.   Psychiatric:         Speech: Speech normal.         Behavior: Behavior normal. Behavior is cooperative.     Flexible video nasolaryngoscopy-nasal septal deviation and nasal changes allergic rhinitis; laryngeal changes consistent with laryngopharyngeal reflux that has worsened slightly since his last endoscopy and since stopping H2 agonist therapy    Assessment:       1. Laryngopharyngeal reflux (LPR)    2. Dysphagia, unspecified type    3. BPPV (benign paroxysmal positional vertigo), right    4. Sensorineural hearing loss (SNHL) of both ears    5. Hearing aid worn    6. Nasal septal deviation     7. Tinnitus of both ears        Plan:       I  will restart the patient's famotidine 40 mg at night before going to bed.  I will recheck him in 3 months, or sooner on an as-needed basis.

## 2023-01-30 ENCOUNTER — OFFICE VISIT (OUTPATIENT)
Dept: OTOLARYNGOLOGY | Facility: CLINIC | Age: 78
End: 2023-01-30
Payer: MEDICARE

## 2023-01-30 VITALS
SYSTOLIC BLOOD PRESSURE: 133 MMHG | TEMPERATURE: 97 F | DIASTOLIC BLOOD PRESSURE: 81 MMHG | HEART RATE: 98 BPM | BODY MASS INDEX: 22.57 KG/M2 | WEIGHT: 180.56 LBS

## 2023-01-30 DIAGNOSIS — R09.89 PHLEGM IN THROAT: ICD-10-CM

## 2023-01-30 DIAGNOSIS — J30.9 ALLERGIC RHINITIS, UNSPECIFIED SEASONALITY, UNSPECIFIED TRIGGER: ICD-10-CM

## 2023-01-30 DIAGNOSIS — H93.13 TINNITUS OF BOTH EARS: ICD-10-CM

## 2023-01-30 DIAGNOSIS — K21.9 LARYNGOPHARYNGEAL REFLUX (LPR): Primary | ICD-10-CM

## 2023-01-30 DIAGNOSIS — R09.82 POSTNASAL DRIP: ICD-10-CM

## 2023-01-30 DIAGNOSIS — J34.2 NASAL SEPTAL DEVIATION: ICD-10-CM

## 2023-01-30 DIAGNOSIS — H81.11 BPPV (BENIGN PAROXYSMAL POSITIONAL VERTIGO), RIGHT: ICD-10-CM

## 2023-01-30 DIAGNOSIS — J38.5 LARYNGOSPASM: ICD-10-CM

## 2023-01-30 DIAGNOSIS — R13.10 DYSPHAGIA, UNSPECIFIED TYPE: ICD-10-CM

## 2023-01-30 DIAGNOSIS — H90.3 SENSORINEURAL HEARING LOSS (SNHL) OF BOTH EARS: ICD-10-CM

## 2023-01-30 DIAGNOSIS — Z97.4 HEARING AID WORN: ICD-10-CM

## 2023-01-30 PROCEDURE — 31575 LARYNGOSCOPY: ICD-10-PCS | Mod: S$PBB,,, | Performed by: SPECIALIST

## 2023-01-30 PROCEDURE — 99999 PR PBB SHADOW E&M-EST. PATIENT-LVL IV: ICD-10-PCS | Mod: PBBFAC,,, | Performed by: SPECIALIST

## 2023-01-30 PROCEDURE — 99214 OFFICE O/P EST MOD 30 MIN: CPT | Mod: PBBFAC,PN | Performed by: SPECIALIST

## 2023-01-30 PROCEDURE — 99999 PR PBB SHADOW E&M-EST. PATIENT-LVL IV: CPT | Mod: PBBFAC,,, | Performed by: SPECIALIST

## 2023-01-30 PROCEDURE — 99214 OFFICE O/P EST MOD 30 MIN: CPT | Mod: 25,S$PBB,, | Performed by: SPECIALIST

## 2023-01-30 PROCEDURE — 31575 DIAGNOSTIC LARYNGOSCOPY: CPT | Mod: PBBFAC,PN | Performed by: SPECIALIST

## 2023-01-30 PROCEDURE — 99214 PR OFFICE/OUTPT VISIT, EST, LEVL IV, 30-39 MIN: ICD-10-PCS | Mod: 25,S$PBB,, | Performed by: SPECIALIST

## 2023-01-30 RX ORDER — FAMOTIDINE 40 MG/1
TABLET, FILM COATED ORAL
Qty: 90 TABLET | Refills: 3 | Status: SHIPPED | OUTPATIENT
Start: 2023-01-30 | End: 2023-05-23 | Stop reason: SDUPTHER

## 2023-01-30 RX ORDER — FLUTICASONE PROPIONATE 50 MCG
2 SPRAY, SUSPENSION (ML) NASAL 2 TIMES DAILY PRN
Qty: 54.6 ML | Refills: 3 | Status: SHIPPED | OUTPATIENT
Start: 2023-01-30 | End: 2023-08-22 | Stop reason: SDUPTHER

## 2023-01-30 NOTE — PROCEDURES
"Laryngoscopy    Date/Time: 1/30/2023 8:20 AM  Performed by: EMI Dahl MD  Authorized by: EMI Dahl MD     Time out: Immediately prior to procedure a "time out" was called to verify the correct patient, procedure, equipment, support staff and site/side marked as required.    Consent Done?:  Yes (Verbal)  Anesthesia:     Local anesthetic:  4% Xylocaine spray with Viral-Synephrine    Patient tolerance:  Patient tolerated the procedure well with no immediate complications    Decongestion performed?: Yes    Laryngoscopy:     Areas examined:  Larynx, hypopharynx, oropharynx, nasopharynx, nasal cavities and vocal cords    Laryngoscope size:  4 mm (Flexible video nasolaryngoscopy)  Nose External:      No external nasal deformity  Nose Intranasal:      Mucosa no polyps     Mucosa ulcers not present     No mucosa lesions present (Clear mucus in the nasal passages bilaterally)     Septum gross deformity (septum deviated high to the right and low to the left with a posterior spur on the left)     Enlarged turbinates (inferior turbinates enlarged bilaterally)  Nasopharynx:      No mucosa lesions     Adenoids not present     Posterior choanae patent     Eustachian tube patent  Larynx/hypopharynx:      No epiglottis lesions     No epiglottis edema     No AE folds lesions     No vocal cord polyps     Equal and normal bilateral (vocal cords move symmetrically, no mucosal lesions noted)     No hypopharynx lesions     No piriform sinus pooling     No piriform sinus lesions     Post cricoid edema (mild, slightly worse than at last endoscopy)     Post cricoid erythema (mild, slightly worse than at last endoscopy)     Flexible video nasolaryngoscopy-nasal septal deviation and nasal changes of allergic rhinitis; laryngeal changes consistent with laryngopharyngeal reflux that has worsened slightly since stopping H2 agonist therapy.  "

## 2023-02-15 ENCOUNTER — OFFICE VISIT (OUTPATIENT)
Dept: CARDIOLOGY | Facility: CLINIC | Age: 78
End: 2023-02-15
Payer: MEDICARE

## 2023-02-15 VITALS
DIASTOLIC BLOOD PRESSURE: 70 MMHG | OXYGEN SATURATION: 94 % | HEIGHT: 75 IN | BODY MASS INDEX: 22.89 KG/M2 | SYSTOLIC BLOOD PRESSURE: 139 MMHG | WEIGHT: 184.06 LBS | HEART RATE: 70 BPM

## 2023-02-15 DIAGNOSIS — I49.3 PVCS (PREMATURE VENTRICULAR CONTRACTIONS): ICD-10-CM

## 2023-02-15 DIAGNOSIS — R42 VERTIGO: ICD-10-CM

## 2023-02-15 DIAGNOSIS — H90.3 BILATERAL SENSORINEURAL HEARING LOSS: ICD-10-CM

## 2023-02-15 DIAGNOSIS — E11.9 TYPE 2 DIABETES MELLITUS WITHOUT COMPLICATION, WITHOUT LONG-TERM CURRENT USE OF INSULIN: ICD-10-CM

## 2023-02-15 DIAGNOSIS — E78.2 MIXED HYPERLIPIDEMIA: ICD-10-CM

## 2023-02-15 DIAGNOSIS — I49.1 PREMATURE ATRIAL CONTRACTIONS: ICD-10-CM

## 2023-02-15 DIAGNOSIS — I65.22 CAROTID STENOSIS, ASYMPTOMATIC, LEFT: ICD-10-CM

## 2023-02-15 DIAGNOSIS — I10 PRIMARY HYPERTENSION: Primary | ICD-10-CM

## 2023-02-15 PROCEDURE — 99213 OFFICE O/P EST LOW 20 MIN: CPT | Mod: S$PBB,25,, | Performed by: INTERNAL MEDICINE

## 2023-02-15 PROCEDURE — 99999 PR PBB SHADOW E&M-EST. PATIENT-LVL IV: ICD-10-PCS | Mod: PBBFAC,,, | Performed by: INTERNAL MEDICINE

## 2023-02-15 PROCEDURE — 93005 ELECTROCARDIOGRAM TRACING: CPT | Mod: PBBFAC,PN | Performed by: INTERNAL MEDICINE

## 2023-02-15 PROCEDURE — 99999 PR PBB SHADOW E&M-EST. PATIENT-LVL IV: CPT | Mod: PBBFAC,,, | Performed by: INTERNAL MEDICINE

## 2023-02-15 PROCEDURE — 93010 EKG 12-LEAD: ICD-10-PCS | Mod: S$PBB,,, | Performed by: INTERNAL MEDICINE

## 2023-02-15 PROCEDURE — 99214 OFFICE O/P EST MOD 30 MIN: CPT | Mod: PBBFAC,PN | Performed by: INTERNAL MEDICINE

## 2023-02-15 PROCEDURE — 93010 ELECTROCARDIOGRAM REPORT: CPT | Mod: S$PBB,,, | Performed by: INTERNAL MEDICINE

## 2023-02-15 PROCEDURE — 99213 PR OFFICE/OUTPT VISIT, EST, LEVL III, 20-29 MIN: ICD-10-PCS | Mod: S$PBB,25,, | Performed by: INTERNAL MEDICINE

## 2023-02-15 NOTE — PROGRESS NOTES
Subjective:      Patient ID: Klever Link is a 78 y.o. male.    Chief Complaint: Follow-up    HPI:  Feels well.    Active.  Mops.  Does yardwork.    Hasn't taken any meds yet today.    Review of Systems   Cardiovascular:  Negative for chest pain, claudication, dyspnea on exertion, irregular heartbeat, leg swelling, near-syncope, orthopnea, palpitations and syncope.        BP at home 132/70    Past Medical History:   Diagnosis Date    Diabetes mellitus     Expressive aphasia 2015    Hyperlipidemia     Hypertension     Stroke     TIA (transient ischemic attack) 2019        Past Surgical History:   Procedure Laterality Date    KNEE SURGERY Bilateral        Family History   Problem Relation Age of Onset    Diabetes Mother     Ovarian cancer Mother     Hypertension Father     Stroke Father     Diabetes Sister     Diabetes Sister     Diabetes Sister     Stroke Sister        Social History     Socioeconomic History    Marital status:    Tobacco Use    Smoking status: Former     Packs/day: 1.00     Years: 0.00     Pack years: 0.00     Types: Cigarettes     Start date: 1965     Quit date: 2008     Years since quittin.5    Smokeless tobacco: Never   Substance and Sexual Activity    Alcohol use: Not Currently    Drug use: No    Sexual activity: Yes     Partners: Female       Current Outpatient Medications on File Prior to Visit   Medication Sig Dispense Refill    ASPIR-LOW 81 mg EC tablet       atorvastatin (LIPITOR) 80 MG tablet Take 1 tablet by mouth once daily.      AZOPT 1 % ophthalmic suspension Place into both eyes 3 (three) times daily.      blood sugar diagnostic Strp 1 strip by Misc.(Non-Drug; Combo Route) route 3 (three) times daily. 100 strip 1    famotidine (PEPCID) 40 MG tablet 1 tablet by mouth every night 90 tablet 3    fluticasone propionate (FLONASE) 50 mcg/actuation nasal spray 2 sprays (100 mcg total) by Each Nostril route 2 (two) times daily as needed for Rhinitis.  "54.6 mL 3    FREESTYLE FREEDOM LITE kit       FREESTYLE LANCETS 28 gauge lancets       hydrochlorothiazide (HYDRODIURIL) 25 MG tablet Take 25 mg by mouth once daily.      lisinopril (PRINIVIL,ZESTRIL) 20 MG tablet Take 20 mg by mouth once daily.      LUMIGAN 0.01 % Drop every evening.      metFORMIN (GLUCOPHAGE) 500 MG tablet Take 1 tablet by mouth once daily.      vit A/vit C/vit E/zinc/copper (PRESERVISION AREDS ORAL) Take by mouth 2 (two) times a day.       No current facility-administered medications on file prior to visit.       Review of patient's allergies indicates:  No Known Allergies  Objective:     Vitals:    02/15/23 0758 02/15/23 0813   BP: (!) 151/72 139/70   BP Location: Right arm Right arm   Patient Position: Sitting Sitting   BP Method: Medium (Automatic)    Pulse: 70    SpO2: (!) 94%    Weight: 83.5 kg (184 lb 1.4 oz)    Height: 6' 3" (1.905 m)         Physical Exam  Vitals reviewed.   Constitutional:       General: He is not in acute distress.     Appearance: He is well-developed. He is not diaphoretic.   Eyes:      General: No scleral icterus.  Neck:      Vascular: No carotid bruit or JVD.   Cardiovascular:      Rate and Rhythm: Regular rhythm.      Heart sounds: Normal heart sounds. No murmur heard.    No friction rub. No gallop.   Pulmonary:      Effort: Pulmonary effort is normal. No respiratory distress.      Breath sounds: Normal breath sounds.   Musculoskeletal:      Right lower leg: No edema.      Left lower leg: No edema.   Skin:     General: Skin is warm and dry.   Neurological:      Mental Status: He is alert and oriented to person, place, and time.   Psychiatric:         Behavior: Behavior normal.         Thought Content: Thought content normal.         Judgment: Judgment normal.      ECG today reviewed by me:  NSR with PAC's, LVH, no significant change      Office Visit on 12/17/2022   Component Date Value Ref Range Status    POC Rapid COVID 12/17/2022 Negative  Negative Final    "  Acceptable 12/17/2022 Yes   Final   (Next appt: 05/12/2022 at 08:20 AM in Otolaryngology (BERTIN Dalh MD)    Dx: Stenosis of carotid artery, unspecifi...   0 Result Notes    Details    Reading Physician Reading Date Result Priority   Milla Smith MD  [phone]  [phone] 12/8/2021 Routine     Narrative & Impression  EXAMINATION:  US CAROTID BILATERAL     CLINICAL HISTORY:  Occlusion and stenosis of unspecified carotid artery     TECHNIQUE:  Grayscale and color Doppler ultrasound examination of the carotid and vertebral artery systems bilaterally.  Stenosis estimates are per the NASCET measurement criteria.     COMPARISON:  11/20/2020     FINDINGS:  Right:     Internal Carotid Artery (ICA) peak systolic velocity 73 cm/sec     ICA/CCA peak systolic velocity ratio: 1.5     Plaque formation: Moderate heterogeneous at the bifurcation     Vertebral artery: Antegrade flow and normal waveform.     Left:     Internal Carotid Artery (ICA)  peak systolic velocity 105 cm/sec     ICA/CCA peak systolic velocity ratio: 2.9     Plaque formation: Moderate heterogeneous at the bifurcation.  There appears to be additional plaque along the distal internal carotid artery.     Vertebral artery: Antegrade flow and normal waveform.     Impression:     Overall, stable exam compared to previous.  Persistent moderate plaque at the carotid artery bifurcation bilaterally with approximately 50% narrowing on the left.        Electronically signed by: Milla Smith  Date:                                            12/08/2021  Time:                                           13:50             Exam Ended: 12/08/21 09:46             Details    Reading Physician Reading Date Result Priority   Dale Vázquez MD  [phone]  [phone] 12/5/2020 STAT     Narrative & Impression  EXAMINATION:  CTA HEAD AND NECK (XPD)     CLINICAL HISTORY:  Carotid or vertebral dissection suspected;Dizziness, non-specific;      TECHNIQUE:  Non contrast low dose axial images were obtained through the head.  CT angiogram was performed from the level of the sarah to the top of the head following the IV administration of 100mL of Omnipaque 350.   Sagittal and coronal reconstructions and maximum intensity projection reconstructions were performed. Arterial stenosis percentages are based on NASCET measurement criteria.     COMPARISON:  CTA head neck performed 02/19/2019.  MRI brain performed 12/05/2020.     FINDINGS:  CT HEAD:     Blood: No acute intracranial hemorrhage.     Parenchyma: No definite loss of gray-white differentiation to suggest acute or subacute transcortical infarct. Generalized age-related parenchymal volume loss is noted.  Nonspecific hypoattenuation in the white matter suggests sequela of chronic small vessel ischemic disease.  Coarse vascular calcification in the left frontal region corresponding to a distal MCA branch.     Ventricles/Extra-axial spaces: No abnormal extra-axial fluid collection. Basal cisterns are patent.     Vessels: Calcific atherosclerosis     Orbits: Unremarkable.     Scalp: Unremarkable.     Skull: There are no depressed skull fractures or destructive bone lesions.     Sinuses and mastoids: Relatively minor mucosal thickening in a suggest retention cyst in the left maxillary sinus.     Other findings: On min delayed postcontrast imaging, no pathologic intracranial enhancement is seen.  Venous structures appear patent grossly.     CTA:     NECK:     Imaged aortic arch: There appears to be anatomic variant short segment common origin of the brachiocephalic and left common carotid arteries.  Calcified and noncalcified atheromatous plaque is noted involving the aorta and great vessel origins.  Mild atheromatous narrowing of the proximal right subclavian artery.     Right common and cervical internal carotid arteries: Calcified and noncalcified atheromatous plaque results in less than 50% stenosis of the  carotid bifurcation and cervical ICA.     Left common and cervical internal carotid arteries: Similar moderate narrowing of the mid common carotid artery which appears to be on the basis of predominantly fibrofatty plaque when compared to the 02/19/2019 CTA.  Similar appearance of multifocal moderate narrowing of the cervical internal carotid artery related to calcified and noncalcified atheromatous plaque, resulting approximately 50% stenosis using NASCET style measurements.     Right cervical vertebral artery: The right vertebral artery appears diminutive in caliber on a developmental basis.  There is superimposed mild in mild-to-moderate narrowing most pronounced involving the V1 segment.  The remainder of the vessel appears patent.  This appearance is unchanged.     Left cervical vertebral artery: Artery scattered multifocal mild atheromatous narrowing without flow-limiting stenosis suggested.     HEAD:     Right anterior circulation:Multifocal moderate atheromatous narrowing of the cavernous and supraclinoid ICA.  No significant stenosis of the MARTHA or MCA.  Right ophthalmic artery is patent.There is a saccular outpouching arising at the distal supraclinoid ICA, distal to the posterior communicating artery aneurysm, possibly at the origin of the anterior choroidal artery, measuring approximately 2.9 x 2.2 by 3.6 mm (AP by TV by cc), with an approximately 2.2 mm neck.     Left anterior circulation: Multifocal moderate atheromatous narrowing involving the cavernous and supraclinoid ICA.  No significant stenosis of the MARTHA or MCA.  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     Posterior circulation: There is no hemodynamically significant vertebrobasilar stenosis.  Mild atheromatous narrowing of bilateral V4 vertebral artery segments.  Overall there is diminutive caliber of the vertebrobasilar system again noted,  presumably developmental related to prominent bilateral posterior communicating arteries.  There is no significant PCA stenosis. Posterior inferior cerebellar arteries patent at origin.     Anterior and posterior communicating arteries: The anterior and posterior communicating arteries are visualized.     NON-ANGIOGRAPHIC FINDINGS:     Visualized intracranial contents: As above     Soft tissues of the neck:     There is an ill-defined lobular centrally low attenuation, peripherally enhancing lesions identified at the tongue base, crossing midline, but eccentric to the left and extending into the left the locule a. this lesion overall measures approximately 1.5 x 1.5 x 1.8 cm (axial series 3, image 314; sagittal reformat series 604, image 136).  No convincing cervical adenopathy is noted.     Visualized sinuses: As above     Dentition: Edentulous     Spine: Extensive degenerative findings in the cervical spine appear relatively similar to the 02/19/2019 CT.     Visualized lungs: Motion compromised examination.  Emphysematous changes are noted.  Calcified granuloma is noted in the right upper lobe.  Some areas of subsegmental atelectasis are additionally noted dependently.     Impression:     1. CT head without definite acute intracranial findings.  2. CTA head neck without evidence of acute large vessel occlusion or flow-limiting stenosis.  Cervical and intracranial atherosclerotic disease and associated vessel narrowing without substantial interval change relative to prior CTA head neck examination, 02/19/2019.  Details as per report body.  3. 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  This may be minimally increased in mediolateral dimension when compared to prior MRA of 02/12/2015.  4. Peripherally enhancing cystic appearing lesion centered at the tongue base extending into the left vallecula.  While this may potentially represent a  benign vallecular cyst, the possibility of a mucosal based neoplasm is not excluded.  As such, correlation with direct visualization and possible referral to otolaryngology with consideration of tissue sampling would be recommended to exclude malignancy.  5. Additional details, as per report body.  This report was flagged in Epic as abnormal.        Electronically signed by: Dale Vázquez  Date:                                            12/05/2020  Time:                                           10:45             Exam Ended: 12/05/20 10:14 Last Resulted: 12/05/20 10:45            Accession #: [id number]    Transthoracic echo (TTE) complete  Order# [id number]  Reading physicians: Petrona Frank MD; Mejia Goodrich III, MD Ordering physician: Adrienne Shahid NP Study date: 2/21/19       Reason for Exam  Priority: Routine  Dx: Stroke [I63.9 (ICD-10-CM)]   Comments: To investigate for wall motion abnormalities     Result Image Hyperlink     Show images for Transthoracic echo (TTE) complete (Cupid Only)    Summary    Normal left ventricular systolic function. The estimated ejection fraction is 55%  No wall motion abnormalities.  Normal LV diastolic function.  Normal right ventricular systolic function.  Normal central venous pressure (3 mm Hg).         Vitals    Height   Accession #: [id number]    Klever Link  Cardiac event monitor  Order# [id number]  Reading physician: Enrique Mora MD Ordering physician: Jose Will NP Study date: 2/21/19       Reason for Exam  Priority: Routine  Dx: TIA (transient ischemic attack) [G45.9 (ICD-10-CM)]         Conclusion    Negative event monitor with no clinical arrhythmias.  No atrial arrhythmia observed         Performing Clinician    Keyoka Bijal   Ref Range & Units1 yr ago   (12/5/20)3 yr ago   (2/21/19)3 yr ago   (2/20/19)3 yr ago   (2/19/19)7 yr ago   (2/12/15) WBC3.90 - 12.70 K/uL6.02 5.45 6.54 5.78 5.54 RBC4.60 - 6.20 M/uL5.70 5.68 5.72 5.76 6.04  Oqebsqtppi07.0 - 18.0 g/dL15.2 15.8 15.8 15.8 16.7 Flxttthoyy18.0 - 54.0 %49.7 48.8 50.7 50.1 49.7 MCV82 - 98 fL87 86 89 87 82 MCH27.0 - 31.0 pg26.7 Low  27.8 27.6 27.4 27.6 MCHC32.0 - 36.0 g/dL30.6 Low  32.4 31.2 Low  31.5 Low  33.6 R RDW11.5 - 14.5 %14.6 High  14.6 High  14.8 High  14.8 High  13.9 Hplrgggns808 - 350 K/uL204 201 200 212 209 MPV9.2 - 12.9 fL9.8 9.8 9.9 10.5 11.0 Immature Granulocytes 137 137 137 139 139 133 Low  Potassium3.5 - 5.1 mmol/L4.3 3.8 4.3 4.8 4.3 4.1 Bnqtbehk65 - 110 mmol/L100 104 102 103 103 97  - 29 mmol/L26 22 Low  25 30 High  24 24 Hgkzvjs46 - 110 mg/dL213 High  133 High  128 High  166 High  133 High  430 High  BUN8 - 23 mg/dL17 16 15 16 19 18 Creatinine0.5 - 1.4 mg/dL1.1 1.0 1.2 1.2 1.2 1.5 High  Calcium8.7 - 10.5 mg/dL9.8 10.2 10.8 High  10.9 High  10.0 10.4 Total Protein6.0 - 8.4 g/dL7.4 6.6 7.2 7.4 7.4 Albumin3.5 - 5.2 g/dL4.3 3.9 4.3 4.3 4.2 Total Bilirubin0.1 - 1.0 mg/dL0.5 0.6 CM 0.6 CM 0.5 CM 0.6 CM Comment: For infants and newborns, interpretation of results should be based   on gestational age, weight and in agreement with clinical   observations.   Premature Infant recommended reference ranges:   Up to 24 hours.............<8.0 mg/dL   Up to 48 hours............<12.0 mg/dL   3-5 days..................<15.0 mg/dL   6-29 days.................<15.0 mg/dL   Alkaline Isngpkblxgp25 - 135 U/L76 69 74 74 104 AST10 - 40 U/L17 14 16 15 15 ALT10 - 44 U/L19 13 13 15 22 Anion Gap8 - 16 mmol/L11 11 10 6 Low  12 12 eGFR if African American>60 mL/min/1.73 m^2>60.0 >60.0 >60.0 >60.0 >60 54 Abnormal  eGFR if non African American>60 mL/min/1.73 m^2>60.0 >60.0 CM 59.2 Abnormal  CM 59.2 Abnormal  CM >60 CM 46 Abnormal  CM Comment: Calculation used to obtain the estimated glomerular filtration   rate (eGFR) is the CKD-EPI equation     Assessment:     1. Primary hypertension    2. Mixed hyperlipidemia    3. Premature atrial contractions    4. PVCs (premature ventricular contractions)    5.  Type 2 diabetes mellitus without complication, without long-term current use of insulin    6. Carotid stenosis, asymptomatic, left    7. Bilateral sensorineural hearing loss    8. Vertigo      Plan:   Klever was seen today for follow-up.    Diagnoses and all orders for this visit:    Primary hypertension  -     IN OFFICE EKG 12-LEAD (to Muse)    Mixed hyperlipidemia  -     IN OFFICE EKG 12-LEAD (to Muse)    Premature atrial contractions  -     IN OFFICE EKG 12-LEAD (to Muse)    PVCs (premature ventricular contractions)  -     IN OFFICE EKG 12-LEAD (to Muse)    Type 2 diabetes mellitus without complication, without long-term current use of insulin  -     IN OFFICE EKG 12-LEAD (to Muse)    Carotid stenosis, asymptomatic, left  -     IN OFFICE EKG 12-LEAD (to Muse)    Bilateral sensorineural hearing loss  -     IN OFFICE EKG 12-LEAD (to Muse)    Vertigo  -     IN OFFICE EKG 12-LEAD (to Muse)     Pt is doing well    Same meds    F/u with Dr Welch next month    RTC 6 months    Follow up in about 6 months (around 8/15/2023).

## 2023-03-13 ENCOUNTER — TELEPHONE (OUTPATIENT)
Dept: OTOLARYNGOLOGY | Facility: CLINIC | Age: 78
End: 2023-03-13
Payer: MEDICARE

## 2023-03-13 NOTE — TELEPHONE ENCOUNTER
Returned pt call. Pt stated another staff member call and LM on VM in regards to outside records. Will have staff follow up upon returning to the office.         ----- Message from Theresa Goldberg sent at 3/13/2023 10:42 AM CDT -----  Contact: [phone]  Pt is returning a call he missed from the office. Please call.          Thank you

## 2023-05-23 ENCOUNTER — OFFICE VISIT (OUTPATIENT)
Dept: OTOLARYNGOLOGY | Facility: CLINIC | Age: 78
End: 2023-05-23
Payer: MEDICARE

## 2023-05-23 VITALS
TEMPERATURE: 98 F | SYSTOLIC BLOOD PRESSURE: 147 MMHG | HEART RATE: 80 BPM | DIASTOLIC BLOOD PRESSURE: 68 MMHG | BODY MASS INDEX: 22.69 KG/M2 | WEIGHT: 181.56 LBS

## 2023-05-23 DIAGNOSIS — R13.10 DYSPHAGIA, UNSPECIFIED TYPE: ICD-10-CM

## 2023-05-23 DIAGNOSIS — J30.9 ALLERGIC RHINITIS, UNSPECIFIED SEASONALITY, UNSPECIFIED TRIGGER: ICD-10-CM

## 2023-05-23 DIAGNOSIS — H90.3 SENSORINEURAL HEARING LOSS (SNHL) OF BOTH EARS: ICD-10-CM

## 2023-05-23 DIAGNOSIS — K21.9 LARYNGOPHARYNGEAL REFLUX (LPR): Primary | ICD-10-CM

## 2023-05-23 DIAGNOSIS — H93.13 TINNITUS OF BOTH EARS: ICD-10-CM

## 2023-05-23 DIAGNOSIS — R09.82 POSTNASAL DRIP: ICD-10-CM

## 2023-05-23 DIAGNOSIS — J38.5 LARYNGOSPASM: ICD-10-CM

## 2023-05-23 DIAGNOSIS — H81.11 BPPV (BENIGN PAROXYSMAL POSITIONAL VERTIGO), RIGHT: ICD-10-CM

## 2023-05-23 DIAGNOSIS — J34.2 NASAL SEPTAL DEVIATION: ICD-10-CM

## 2023-05-23 DIAGNOSIS — Z97.4 HEARING AID WORN: ICD-10-CM

## 2023-05-23 PROCEDURE — 99999 PR PBB SHADOW E&M-EST. PATIENT-LVL III: ICD-10-PCS | Mod: PBBFAC,,, | Performed by: SPECIALIST

## 2023-05-23 PROCEDURE — 99999 PR PBB SHADOW E&M-EST. PATIENT-LVL III: CPT | Mod: PBBFAC,,, | Performed by: SPECIALIST

## 2023-05-23 PROCEDURE — 99213 OFFICE O/P EST LOW 20 MIN: CPT | Mod: S$PBB,,, | Performed by: SPECIALIST

## 2023-05-23 PROCEDURE — 99213 PR OFFICE/OUTPT VISIT, EST, LEVL III, 20-29 MIN: ICD-10-PCS | Mod: S$PBB,,, | Performed by: SPECIALIST

## 2023-05-23 PROCEDURE — 99213 OFFICE O/P EST LOW 20 MIN: CPT | Mod: PBBFAC,PN | Performed by: SPECIALIST

## 2023-05-23 RX ORDER — FAMOTIDINE 40 MG/1
TABLET, FILM COATED ORAL
Qty: 90 TABLET | Refills: 3 | Status: SHIPPED | OUTPATIENT
Start: 2023-05-23

## 2023-05-23 NOTE — PROGRESS NOTES
Subjective:       Patient ID: Klever Link is a 78 y.o. male.    Chief Complaint: Follow-up    For follow-up visit.  There are multiple issues to discuss:  1. Laryngopharyngeal reflux:  In general the patient has done well.  One week ago he had an episode of laryngospasm while sitting in a chair and working on his computer.  He fell asleep and awakened gasping for air.  He is had none other since.  He does have mucus in his throat every morning.  He is taking famotidine 40 mg once daily  2. Sensorineural hearing loss/hearing aid worn:  Patient does wears hearing aid all time when he is awake.  3. Tinnitus:  Tinnitus has pretty much resolved with use of the hearing aid.  He is very light when he is not using hearing aids.  4. Allergic rhinitis: He will use fluticasone nasal spray on an as-needed basis  5. BPPV, right ear:  The patient has had no further episodes of vertigo in over a year.          Review of Systems     Constitutional: Negative for appetite change, chills, fatigue, fever and unexpected weight loss.      HENT: Positive for hearing loss, postnasal drip, ringing in the ears, runny nose, sinus pressure, sore throat and stuffy nose.  Negative for ear discharge, ear infection, ear pain, facial swelling, mouth sores, nosebleeds, sinus infection, tonsil infection, dental problems, trouble swallowing and voice change.      Eyes:  Negative for change in eyesight, eye drainage, eye itching and photophobia.     Respiratory:  Positive for cough and snoring. Negative for shortness of breath, sleep apnea and wheezing.  Choking/laryngospasm    Cardiovascular:  Negative for chest pain, foot swelling, irregular heartbeat and swollen veins.     Gastrointestinal:  Negative for abdominal pain, acid reflux, constipation, diarrhea, heartburn and vomiting.     Genitourinary: Negative for difficulty urinating, sexual problems and frequent urination.     Musc: Negative for aching joints, aching muscles, back pain and  neck pain.     Skin: Negative for rash.     Allergy: Positive for seasonal allergies (perennial allergies with seasonal exacerbations).     Endocrine: Negative for cold intolerance and heat intolerance.      Neurological: Negative for dizziness, headaches, light-headedness, seizures and tremors.      Hematologic: Negative for bruises/bleeds easily and swollen glands.      Psychiatric: Negative for decreased concentration, depression, nervous/anxious and sleep disturbance.              Objective:      Physical Exam  Vitals and nursing note reviewed.   Constitutional:       General: He is awake.      Appearance: Normal appearance. He is well-developed, well-groomed and normal weight.   HENT:      Head: Normocephalic.      Jaw: There is normal jaw occlusion.      Salivary Glands: Right salivary gland is not diffusely enlarged or tender. Left salivary gland is not diffusely enlarged or tender.      Right Ear: Ear canal and external ear normal. Decreased hearing noted. Tympanic membrane is retracted.      Left Ear: Ear canal and external ear normal. Decreased hearing noted. Tympanic membrane is retracted.      Nose: Septal deviation (to the left), mucosal edema (cyanotic, boggy inferior turbinates bilaterally) and rhinorrhea (clear mucus bilaterally) present. No nasal deformity. Rhinorrhea is clear.      Right Turbinates: Enlarged and pale.      Left Turbinates: Enlarged and pale.      Mouth/Throat:      Lips: No lesions.      Mouth: Mucous membranes are moist. No oral lesions.      Dentition: Abnormal dentition (edentulous upper and lower arches). Has dentures (full upper and lower dentures). No gum lesions.      Tongue: No lesions.      Palate: No mass and lesions.      Pharynx: Oropharynx is clear. Uvula midline.   Eyes:      General: Lids are normal.         Right eye: No discharge.         Left eye: No discharge.      Conjunctiva/sclera:      Right eye: Right conjunctiva is injected. No exudate.     Left eye: Left  conjunctiva is injected. No exudate.     Pupils: Pupils are equal, round, and reactive to light.   Neck:      Thyroid: No thyroid mass or thyromegaly.      Trachea: Trachea normal. No tracheal deviation.   Cardiovascular:      Rate and Rhythm: Normal rate and regular rhythm.      Pulses: Normal pulses.      Heart sounds: Normal heart sounds.   Pulmonary:      Effort: Pulmonary effort is normal.      Breath sounds: Normal breath sounds. No stridor. No decreased breath sounds, wheezing, rhonchi or rales.   Abdominal:      General: Bowel sounds are normal.      Palpations: Abdomen is soft.      Tenderness: There is no abdominal tenderness.   Musculoskeletal:         General: Normal range of motion.      Cervical back: Normal range of motion. No muscular tenderness.   Lymphadenopathy:      Head:      Right side of head: No submental, submandibular, preauricular, posterior auricular or occipital adenopathy.      Left side of head: No submental, submandibular, preauricular, posterior auricular or occipital adenopathy.      Cervical: No cervical adenopathy.   Skin:     General: Skin is warm and dry.      Findings: No petechiae or rash.      Nails: There is no clubbing.   Neurological:      Mental Status: He is alert and oriented to person, place, and time.      Cranial Nerves: No cranial nerve deficit.      Sensory: No sensory deficit.      Gait: Gait normal.   Psychiatric:         Speech: Speech normal.         Behavior: Behavior normal. Behavior is cooperative.         Thought Content: Thought content normal.         Judgment: Judgment normal.       Assessment:       1. Laryngopharyngeal reflux (LPR)    2. Laryngospasm    3. Dysphagia, unspecified type    4. Sensorineural hearing loss (SNHL) of both ears    5. Tinnitus of both ears    6. Hearing aid worn    7. BPPV (benign paroxysmal positional vertigo), right    8. Allergic rhinitis, unspecified seasonality, unspecified trigger    9. Nasal septal deviation    10.  Postnasal drip        Plan:       I will have the patient continue his anti-reflux diet and using famotidine on a daily basis.  He will use his fluticasone nasal spray whenever he is having allergy symptoms and may supplement with loratadine if symptoms are not controlled by the nose spray.  I will recheck him in 3 months, or sooner on an as-needed basis.

## 2023-07-28 ENCOUNTER — HOSPITAL ENCOUNTER (OUTPATIENT)
Dept: RADIOLOGY | Facility: OTHER | Age: 78
Discharge: HOME | End: 2023-07-28
Attending: INTERNAL MEDICINE
Payer: MEDICARE

## 2023-07-28 DIAGNOSIS — R91.1 PULMONARY NODULE: ICD-10-CM

## 2023-07-28 PROCEDURE — 71250 CT CHEST WITHOUT CONTRAST: ICD-10-PCS | Mod: 26,,, | Performed by: RADIOLOGY

## 2023-07-28 PROCEDURE — 71250 CT THORAX DX C-: CPT | Mod: 26,,, | Performed by: RADIOLOGY

## 2023-07-28 PROCEDURE — 71250 CT THORAX DX C-: CPT | Mod: TC

## 2023-08-05 ENCOUNTER — OFFICE VISIT (OUTPATIENT)
Dept: URGENT CARE | Facility: CLINIC | Age: 78
End: 2023-08-05
Payer: MEDICARE

## 2023-08-05 VITALS
DIASTOLIC BLOOD PRESSURE: 80 MMHG | OXYGEN SATURATION: 98 % | HEIGHT: 75 IN | BODY MASS INDEX: 22.56 KG/M2 | TEMPERATURE: 99 F | RESPIRATION RATE: 19 BRPM | SYSTOLIC BLOOD PRESSURE: 154 MMHG | WEIGHT: 181.44 LBS | HEART RATE: 77 BPM

## 2023-08-05 DIAGNOSIS — J06.9 ACUTE URI: ICD-10-CM

## 2023-08-05 DIAGNOSIS — I10 PRIMARY HYPERTENSION: ICD-10-CM

## 2023-08-05 DIAGNOSIS — R05.9 COUGH, UNSPECIFIED TYPE: Primary | ICD-10-CM

## 2023-08-05 LAB
CTP QC/QA: YES
SARS-COV-2 AG RESP QL IA.RAPID: NEGATIVE

## 2023-08-05 PROCEDURE — 87811 SARS CORONAVIRUS 2 ANTIGEN POCT, MANUAL READ: ICD-10-PCS | Mod: QW,S$GLB,, | Performed by: FAMILY MEDICINE

## 2023-08-05 PROCEDURE — 87811 SARS-COV-2 COVID19 W/OPTIC: CPT | Mod: QW,S$GLB,, | Performed by: FAMILY MEDICINE

## 2023-08-05 PROCEDURE — 99213 OFFICE O/P EST LOW 20 MIN: CPT | Mod: S$GLB,,, | Performed by: FAMILY MEDICINE

## 2023-08-05 PROCEDURE — 99213 PR OFFICE/OUTPT VISIT, EST, LEVL III, 20-29 MIN: ICD-10-PCS | Mod: S$GLB,,, | Performed by: FAMILY MEDICINE

## 2023-08-05 RX ORDER — LISINOPRIL 40 MG/1
40 TABLET ORAL
COMMUNITY
Start: 2023-07-28

## 2023-08-05 RX ORDER — PROMETHAZINE HYDROCHLORIDE AND DEXTROMETHORPHAN HYDROBROMIDE 6.25; 15 MG/5ML; MG/5ML
5 SYRUP ORAL 3 TIMES DAILY PRN
Qty: 120 ML | Refills: 0 | Status: SHIPPED | OUTPATIENT
Start: 2023-08-05 | End: 2023-08-15

## 2023-08-05 RX ORDER — ATORVASTATIN CALCIUM 80 MG/1
TABLET, FILM COATED ORAL
COMMUNITY
Start: 2022-12-05 | End: 2024-03-12

## 2023-08-05 NOTE — PROGRESS NOTES
"Subjective:      Patient ID: Klever Link is a 78 y.o. male.    Vitals:  height is 6' 3" (1.905 m) and weight is 82.3 kg (181 lb 7 oz). His oral temperature is 98.7 °F (37.1 °C). His blood pressure is 154/80 (abnormal) and his pulse is 77. His respiration is 19 and oxygen saturation is 98%.     Chief Complaint: Cough (With congestion associated)    78 years old male with history of hypertension and diabetes reports to the clinic with the compliant of cough and congestion that presented on Wednesday, patient reports with visiting a family friend at the Hospital on Tuesday and believes that he may have picked up or have been exposed to COVID-19 consequently, pt is requesting for a COVID-19 test.  Patient denies fever, chills, chest pain, SOB.  Blood pressure is noted to be moderately elevated.  Patient is compliant with her blood pressure medicines.    Cough  This is a new problem. The problem has been gradually worsening. The problem occurs every few minutes. The cough is Productive of sputum. Associated symptoms include nasal congestion and a sore throat. Pertinent negatives include no chest pain, chills, ear congestion, ear pain, fever, headaches, heartburn, hemoptysis, myalgias, postnasal drip, rash, rhinorrhea, shortness of breath, sweats, weight loss or wheezing. Nothing aggravates the symptoms. He has tried nothing for the symptoms. The treatment provided no relief. There is no history of asthma, bronchiectasis, bronchitis, COPD, emphysema, environmental allergies or pneumonia.       Constitution: Negative for chills and fever.   HENT:  Positive for sore throat. Negative for ear pain and postnasal drip.    Cardiovascular:  Negative for chest pain.   Respiratory:  Positive for cough. Negative for bloody sputum, shortness of breath and wheezing.    Gastrointestinal:  Negative for heartburn.   Musculoskeletal:  Negative for muscle ache.   Skin:  Negative for rash.   Allergic/Immunologic: Negative for " environmental allergies.   Neurological:  Negative for headaches.      Objective:     Physical Exam   Constitutional: He is oriented to person, place, and time. He appears well-developed. He is cooperative.  Non-toxic appearance. He does not appear ill. No distress.   HENT:   Head: Normocephalic and atraumatic.   Ears:   Right Ear: Hearing, tympanic membrane, external ear and ear canal normal. impacted cerumen  Left Ear: Hearing, tympanic membrane, external ear and ear canal normal. impacted cerumen  Nose: Congestion present. No mucosal edema, rhinorrhea or nasal deformity. No epistaxis. Right sinus exhibits no maxillary sinus tenderness and no frontal sinus tenderness. Left sinus exhibits no maxillary sinus tenderness and no frontal sinus tenderness.   Mouth/Throat: Uvula is midline, oropharynx is clear and moist and mucous membranes are normal. No trismus in the jaw. Normal dentition. No uvula swelling. No oropharyngeal exudate, posterior oropharyngeal edema or posterior oropharyngeal erythema.   Eyes: Conjunctivae and lids are normal. No scleral icterus.   Neck: Trachea normal and phonation normal. Neck supple. No edema present. No erythema present. No neck rigidity present.   Cardiovascular: Normal rate, regular rhythm, normal heart sounds and normal pulses.   No murmur heard.  Pulmonary/Chest: Effort normal and breath sounds normal. No stridor. No respiratory distress. He has no decreased breath sounds. He has no wheezes. He has no rhonchi. He has no rales.   Abdominal: Normal appearance. He exhibits no distension. There is no abdominal tenderness.   Musculoskeletal: Normal range of motion.         General: No deformity. Normal range of motion.      Cervical back: He exhibits no tenderness.   Lymphadenopathy:     He has no cervical adenopathy.   Neurological: He is alert and oriented to person, place, and time. He exhibits normal muscle tone. Coordination normal.   Skin: Skin is warm, dry, intact, not  diaphoretic and not pale.   Psychiatric: His speech is normal and behavior is normal. Judgment and thought content normal.   Nursing note and vitals reviewed.      Assessment:     1. Cough, unspecified type    2. Primary hypertension    3. Acute URI        Plan:   Discussed results/diagnosis/plan with patient in clinic. Advised to f/u with PCP within 2-5 days.  Blood pressure precautions advised. ER precautions given if symptoms get any worse. All questions answered. Patient verbally understood and agreed with treatment plan.     Cough, unspecified type  -     SARS Coronavirus 2 Antigen, POCT Manual Read    Primary hypertension    Acute URI    Other orders  -     promethazine-dextromethorphan (PROMETHAZINE-DM) 6.25-15 mg/5 mL Syrp; Take 5 mLs by mouth 3 (three) times daily as needed (cough).  Dispense: 120 mL; Refill: 0

## 2023-08-22 ENCOUNTER — OFFICE VISIT (OUTPATIENT)
Dept: OTOLARYNGOLOGY | Facility: CLINIC | Age: 78
End: 2023-08-22
Payer: MEDICARE

## 2023-08-22 VITALS
HEART RATE: 71 BPM | HEIGHT: 75 IN | OXYGEN SATURATION: 97 % | BODY MASS INDEX: 22.68 KG/M2 | SYSTOLIC BLOOD PRESSURE: 140 MMHG | DIASTOLIC BLOOD PRESSURE: 68 MMHG

## 2023-08-22 DIAGNOSIS — J30.9 ALLERGIC RHINITIS, UNSPECIFIED SEASONALITY, UNSPECIFIED TRIGGER: ICD-10-CM

## 2023-08-22 DIAGNOSIS — Z97.4 HEARING AID WORN: ICD-10-CM

## 2023-08-22 DIAGNOSIS — J38.5 LARYNGOSPASM: ICD-10-CM

## 2023-08-22 DIAGNOSIS — H90.3 SENSORINEURAL HEARING LOSS (SNHL) OF BOTH EARS: ICD-10-CM

## 2023-08-22 DIAGNOSIS — R13.10 DYSPHAGIA, UNSPECIFIED TYPE: ICD-10-CM

## 2023-08-22 DIAGNOSIS — H93.13 TINNITUS OF BOTH EARS: ICD-10-CM

## 2023-08-22 DIAGNOSIS — K21.9 LARYNGOPHARYNGEAL REFLUX (LPR): Primary | ICD-10-CM

## 2023-08-22 DIAGNOSIS — R09.82 POSTNASAL DRIP: ICD-10-CM

## 2023-08-22 DIAGNOSIS — H81.11 BPPV (BENIGN PAROXYSMAL POSITIONAL VERTIGO), RIGHT: ICD-10-CM

## 2023-08-22 PROCEDURE — 99214 OFFICE O/P EST MOD 30 MIN: CPT | Mod: PBBFAC,PN,25 | Performed by: SPECIALIST

## 2023-08-22 PROCEDURE — 99214 PR OFFICE/OUTPT VISIT, EST, LEVL IV, 30-39 MIN: ICD-10-PCS | Mod: 25,S$PBB,, | Performed by: SPECIALIST

## 2023-08-22 PROCEDURE — 99214 OFFICE O/P EST MOD 30 MIN: CPT | Mod: 25,S$PBB,, | Performed by: SPECIALIST

## 2023-08-22 PROCEDURE — 31575 DIAGNOSTIC LARYNGOSCOPY: CPT | Mod: PBBFAC,PN | Performed by: SPECIALIST

## 2023-08-22 PROCEDURE — 99999 PR PBB SHADOW E&M-EST. PATIENT-LVL IV: CPT | Mod: PBBFAC,,, | Performed by: SPECIALIST

## 2023-08-22 PROCEDURE — 31575 DIAGNOSTIC LARYNGOSCOPY: CPT | Mod: S$PBB,,, | Performed by: SPECIALIST

## 2023-08-22 PROCEDURE — 99999 PR PBB SHADOW E&M-EST. PATIENT-LVL IV: ICD-10-PCS | Mod: PBBFAC,,, | Performed by: SPECIALIST

## 2023-08-22 PROCEDURE — 31575 PR LARYNGOSCOPY, FLEXIBLE; DIAGNOSTIC: ICD-10-PCS | Mod: S$PBB,,, | Performed by: SPECIALIST

## 2023-08-22 RX ORDER — FLUTICASONE PROPIONATE 50 MCG
2 SPRAY, SUSPENSION (ML) NASAL 2 TIMES DAILY PRN
Qty: 54.6 ML | Refills: 3 | Status: SHIPPED | OUTPATIENT
Start: 2023-08-22

## 2023-08-22 NOTE — PROGRESS NOTES
Subjective:       Patient ID: Klever Link is a 78 y.o. male.    Chief Complaint: No chief complaint on file.    For follow-up visit.  There are multiple issues to discuss:  1. Laryngopharyngeal reflux:  Overall, he is doing well with regard to his reflux.  He is taking famotidine 40 mg once daily  2. Sensorineural hearing loss/hearing aid worn:  Patient does wears hearing aid all time when he is awake.  3. Tinnitus:  Tinnitus has become almost unnoticeable since he has started wearing hearing aids.    4. Allergic rhinitis:  Nasal secretions are clear.  He is not having significant congestion, sneezing or postnasal drip currently.  He will use fluticasone nasal spray as his primary drug and loratadine on an as-needed basis  5. BPPV, right ear:  The patient has had no further episodes of vertigo in over a year.            Review of Systems     Constitutional: Negative for appetite change, chills, fatigue, fever and unexpected weight loss.      HENT: Positive for hearing loss, postnasal drip, ringing in the ears, runny nose, sinus pressure, sore throat and stuffy nose.  Negative for ear discharge, ear infection, ear pain, facial swelling, mouth sores, nosebleeds, sinus infection, tonsil infection, dental problems, trouble swallowing and voice change.      Eyes:  Negative for change in eyesight, eye drainage, eye itching and photophobia.     Respiratory:  Positive for cough and snoring. Negative for shortness of breath, sleep apnea and wheezing.  Choking/laryngospasm    Cardiovascular:  Negative for chest pain, foot swelling, irregular heartbeat and swollen veins.     Gastrointestinal:  Negative for abdominal pain, acid reflux, constipation, diarrhea, heartburn and vomiting.     Genitourinary: Negative for difficulty urinating, sexual problems and frequent urination.     Musc: Negative for aching joints, aching muscles, back pain and neck pain.     Skin: Negative for rash.     Allergy: Positive for seasonal  allergies (perennial allergies with seasonal exacerbations). Negative for food allergies.     Endocrine: Negative for cold intolerance and heat intolerance.      Neurological: Negative for dizziness, headaches, light-headedness, seizures and tremors.      Hematologic: Negative for bruises/bleeds easily and swollen glands.      Psychiatric: Negative for decreased concentration, depression, nervous/anxious and sleep disturbance.                Objective:      Physical Exam  Vitals and nursing note reviewed.   Constitutional:       General: He is awake.      Appearance: Normal appearance. He is well-developed, well-groomed and normal weight.   HENT:      Head: Normocephalic.      Jaw: There is normal jaw occlusion.      Salivary Glands: Right salivary gland is not diffusely enlarged or tender. Left salivary gland is not diffusely enlarged or tender.      Right Ear: Ear canal and external ear normal. Decreased hearing noted. Tympanic membrane is retracted.      Left Ear: Ear canal and external ear normal. Decreased hearing noted. Tympanic membrane is retracted.      Nose: Septal deviation (to the left), mucosal edema (cyanotic, boggy inferior turbinates bilaterally) and rhinorrhea (clear mucus bilaterally) present. No nasal deformity. Rhinorrhea is clear.      Right Turbinates: Enlarged and pale.      Left Turbinates: Enlarged and pale.      Mouth/Throat:      Lips: No lesions.      Mouth: Mucous membranes are moist. No oral lesions.      Dentition: Abnormal dentition (edentulous upper and lower arches). Has dentures (full upper and lower dentures). No gum lesions.      Tongue: No lesions.      Palate: No mass and lesions.      Pharynx: Oropharynx is clear. Uvula midline.   Eyes:      General: Lids are normal.         Right eye: No discharge.         Left eye: No discharge.      Conjunctiva/sclera:      Right eye: Right conjunctiva is injected. No exudate.     Left eye: Left conjunctiva is injected. No exudate.      Pupils: Pupils are equal, round, and reactive to light.   Neck:      Thyroid: No thyroid mass or thyromegaly.      Trachea: Trachea normal. No tracheal deviation.   Cardiovascular:      Rate and Rhythm: Normal rate and regular rhythm.      Pulses: Normal pulses.      Heart sounds: Normal heart sounds.   Pulmonary:      Effort: Pulmonary effort is normal.      Breath sounds: Normal breath sounds. No stridor. No decreased breath sounds, wheezing, rhonchi or rales.   Abdominal:      General: Bowel sounds are normal.      Palpations: Abdomen is soft.      Tenderness: There is no abdominal tenderness.   Musculoskeletal:         General: Normal range of motion.      Cervical back: Normal range of motion. No muscular tenderness.   Lymphadenopathy:      Head:      Right side of head: No submental, submandibular, preauricular, posterior auricular or occipital adenopathy.      Left side of head: No submental, submandibular, preauricular, posterior auricular or occipital adenopathy.      Cervical: No cervical adenopathy.   Skin:     General: Skin is warm and dry.      Findings: No petechiae or rash.      Nails: There is no clubbing.   Neurological:      Mental Status: He is alert and oriented to person, place, and time.      Cranial Nerves: No cranial nerve deficit.      Sensory: No sensory deficit.      Gait: Gait normal.   Psychiatric:         Speech: Speech normal.         Behavior: Behavior normal. Behavior is cooperative.         Thought Content: Thought content normal.         Judgment: Judgment normal.         Flexible video nasolaryngoscopy-nasal septal deviation and nasal changes allergic rhinitis; laryngeal changes consistent with laryngopharyngeal reflux that continues to improve with H2 agonist therapy, vocal cords injected-patient has had upper respiratory infection last week    Laryngeal pictures:              Nasopharynx:      Nasal pictures:                  Assessment:       1. Laryngopharyngeal reflux (LPR)     2. Laryngospasm    3. Dysphagia, unspecified type    4. Sensorineural hearing loss (SNHL) of both ears    5. Tinnitus of both ears    6. Hearing aid worn    7. BPPV (benign paroxysmal positional vertigo), right    8. Allergic rhinitis, unspecified seasonality, unspecified trigger    9. Postnasal drip          Plan:       I  will have the patient continue with his current drug regimen.  I will recheck him in 6 months, or sooner on as-needed basis.

## 2024-03-08 ENCOUNTER — OFFICE VISIT (OUTPATIENT)
Dept: URGENT CARE | Facility: CLINIC | Age: 79
End: 2024-03-08
Payer: MEDICARE

## 2024-03-08 VITALS
BODY MASS INDEX: 22.5 KG/M2 | HEART RATE: 74 BPM | RESPIRATION RATE: 20 BRPM | DIASTOLIC BLOOD PRESSURE: 85 MMHG | SYSTOLIC BLOOD PRESSURE: 150 MMHG | OXYGEN SATURATION: 95 % | HEIGHT: 75 IN | TEMPERATURE: 98 F | WEIGHT: 181 LBS

## 2024-03-08 DIAGNOSIS — Z20.822 CLOSE EXPOSURE TO COVID-19 VIRUS: Primary | ICD-10-CM

## 2024-03-08 DIAGNOSIS — U07.1 COVID-19 VIRUS DETECTED: ICD-10-CM

## 2024-03-08 DIAGNOSIS — U07.1 COVID: ICD-10-CM

## 2024-03-08 LAB
CTP QC/QA: YES
SARS-COV-2 AG RESP QL IA.RAPID: POSITIVE

## 2024-03-08 PROCEDURE — 87811 SARS-COV-2 COVID19 W/OPTIC: CPT | Mod: QW,S$GLB,, | Performed by: FAMILY MEDICINE

## 2024-03-08 PROCEDURE — 99213 OFFICE O/P EST LOW 20 MIN: CPT | Mod: S$GLB,,, | Performed by: FAMILY MEDICINE

## 2024-03-08 RX ORDER — MOLNUPIRAVIR 200 MG/1
800 CAPSULE ORAL EVERY 12 HOURS
Qty: 40 CAPSULE | Refills: 0 | Status: SHIPPED | OUTPATIENT
Start: 2024-03-08 | End: 2024-03-13

## 2024-03-08 NOTE — PATIENT INSTRUCTIONS
Below are suggestions for symptomatic relief of your upper respiratory symptoms:              -Salt water gargles to soothe throat pain.              -Chloroseptic spray and Cepacol lozenges also help to numb throat pain.              -Warm herbal teas with honey/lemon/ranulfo can help soothe sore throat and hoarseness              -Nasal saline spray reduces inflammation and dryness.              -Warm face compresses to help with facial sinus pain/pressure.              -Humidifiers and steam can help with nasal dryness and congestion              -Vicks vapor rub at night for chest congestion.              -Flonase OTC or Nasacort OTC for nasal congestion and post-nasal drip. Ok to use twice daily for the first week, then reduce to once daily after symptoms have begun to improve.              -Afrin is a nasal spray that can give immediate relief of nasal congestion but you cannot use this medication for more than 3 days              -Simple foods like chicken noodle soup.              - Mucinex for congestion or Mucinex DM for cough during the day time. Delsym helps with coughing at night. Mucinex-D if you have sinus pressure/sinus pain or chest congestion. (caution if history of high blood pressure or palpitations). You must increase your water intake when using expectorants (Mucinex).             -Zyrtec/Claritin/Allegra/Xyzal should help with allergies.  -If you DO NOT have Hypertension or any history of palpitations, it is ok to take over the counter Sudafed or Mucinex D or Allegra-D or Claritin-D or Zyrtec-D.  -If you do take one of the above, it is ok to combine that with plain over the counter Mucinex or Allegra or Claritin or Zyrtec. If, for example, you are taking Zyrtec -D, you can combine that with Mucinex, but not Mucinex-D.  If you are taking Mucinex-D, you can combine that with plain Allegra or Claritin or Zyrtec.   -If you DO have Hypertension or palpitations, it is safe to take Coricidin HBP for  relief of sinus symptoms.     Your test was POSITIVE for COVID-19 (coronavirus).       Please isolate yourself at home.  You may leave home and/or return to work once the following conditions are met:    If you were not hospitalized and are not moderately to severely immunocompromised:   More than 5 days since symptoms first appeared AND  More than 24 hours fever free without medications AND  Symptoms are improving  Continue to wear a mask around others for 5 additional days.    If you were hospitalized OR are moderately to severely immunocompromised:  More than 20 days since symptoms first appeared  More than 24 hours fever free without medications  Symptoms have improved    If you had no symptoms but tested positive:  More than 5 days since the date of the first positive test (20 days if moderately to severely immunocompromised). If you develop symptoms, then use the guidelines above.  Continue to wear a mask around others for 5 additional days.      Contact Tracing    As one of the next steps, you will receive a call or text from the [place] Department of Health ([facility]) COVID-19 Tracing Team. See the contact information below so you know not to ignore the health departments call. It is important that you contact them back immediately so they can help.      Contact Tracer Number:  [phone]  Caller ID for most carriers: [facility]     What is contact tracing?  Contact tracing is a process that helps identify everyone who has been in close contact with an infected person. Contact tracers let those people know they may have been exposed and guide them on next steps. Confidentiality is important for everyone; no one will be told who may have exposed them to the virus.  Your involvement is important. The more we know about where and how this virus is spreading, the better chance we have at stopping it from spreading further.  What does exposure mean?  Exposure means you have been within 6 feet for more than  15 minutes with a person who has or had COVID-19.  What kind of questions do the contact tracers ask?  A contact tracer will confirm your basic contact information including name, address, phone number, and next of kin, as well as asking about any symptoms you may have had. Theyll also ask you how you think you may have gotten sick, such as places where you may have been exposed to the virus, and people you were with. Those names will never be shared with anyone outside of that call, and will only be used to help trace and stop the spread of the virus.   I have privacy concerns. How will the state use my information?  Your privacy about your health is important. All calls are completed using call centers that use the appropriate health privacy protection measures (HIPAA compliance), meaning that your patient information is safe. No one will ever ask you any questions related to immigration status. Your health comes first.   Do I have to participate?  You do not have to participate, but we strongly encourage you to. Contact tracing can help us catch and control new outbreaks as theyre developing to keep your friends and family safe.   What if I dont hear from anyone?  If you dont receive a call within 24 hours, you can call the number above right away to inquire about your status. That line is open from 8:00 am - 8:00 p.m., 7 days a week.  Contact tracing saves lives! Together, we have the power to beat this virus and keep our loved ones and neighbors safe.    For more information see CDC link below.      https://www.cdc.gov/coronavirus/2019-ncov/hcp/guidance-prevent-spread.html#precautions        Sources:  CDC, [place] [facility]           Seek immediate care in the emergency room in the event of severe abdominal pain, chest pain, respiratory distress, fever unresponsive to antipyretic, dehydration, loss of consciousness, seizure.

## 2024-03-08 NOTE — PROGRESS NOTES
"Subjective:      Patient ID: Klever Link is a 79 y.o. male.    Vitals:  height is 6' 3" (1.905 m) and weight is 82.1 kg (181 lb). His oral temperature is 97.9 °F (36.6 °C). His blood pressure is 150/85 (abnormal) and his pulse is 74. His respiration is 20 and oxygen saturation is 95%.     Chief Complaint: COVID exposure.    Pt states his wife came in on yesterday and tested positive for covid. He has no symptoms. Fever free. Would like to be tested.      Constitution: Negative for activity change, appetite change, chills, sweating, fatigue and fever.   Cardiovascular:  Negative for chest pain, leg swelling, palpitations and sob on exertion.   Respiratory:  Negative for cough, sputum production, shortness of breath, stridor and wheezing.       Objective:     Vitals:    03/08/24 1106   BP: (!) 150/85   BP Location: Right arm   Patient Position: Sitting   BP Method: Medium (Automatic)   Pulse: 74   Resp: 20   Temp: 97.9 °F (36.6 °C)   TempSrc: Oral   SpO2: 95%   Weight: 82.1 kg (181 lb)   Height: 6' 3" (1.905 m)      Physical Exam   Constitutional: He is oriented to person, place, and time.  Non-toxic appearance. He does not appear ill. No distress.   HENT:   Head: Atraumatic.   Eyes: Conjunctivae are normal.   Neck: Neck supple.   Cardiovascular: Normal rate, regular rhythm, normal heart sounds and normal pulses.   Pulmonary/Chest: Effort normal and breath sounds normal.   Neurological: He is alert and oriented to person, place, and time.   Skin: Skin is not diaphoretic.   Psychiatric: Judgment and thought content normal.     Results for orders placed or performed in visit on 03/08/24   SARS Coronavirus 2 Antigen, POCT Manual Read   Result Value Ref Range    SARS Coronavirus 2 Antigen Positive (A) Negative     Acceptable Yes         Assessment:     1. Close exposure to COVID-19 virus    2. COVID        Plan:       Close exposure to COVID-19 virus  -     SARS Coronavirus 2 Antigen, POCT Manual " Read    2. COVID  -     molnupiravir (LAGEVRIO, EUA,) 200 mg capsule (EUA); Take 4 capsules (800 mg total) by mouth every 12 (twelve) hours. for 5 days  Dispense: 40 capsule; Refill: 0  We discussed indications, risk/ side effects and benefits. Patient would like to consider anticovid pill. Information pamphlet provided.  COVID risk score of 5 . We discussed new CDC guidelines.    Patient Instructions   Below are suggestions for symptomatic relief of your upper respiratory symptoms:              -Salt water gargles to soothe throat pain.              -Chloroseptic spray and Cepacol lozenges also help to numb throat pain.              -Warm herbal teas with honey/lemon/ranulfo can help soothe sore throat and hoarseness              -Nasal saline spray reduces inflammation and dryness.              -Warm face compresses to help with facial sinus pain/pressure.              -Humidifiers and steam can help with nasal dryness and congestion              -Vicks vapor rub at night for chest congestion.              -Flonase OTC or Nasacort OTC for nasal congestion and post-nasal drip. Ok to use twice daily for the first week, then reduce to once daily after symptoms have begun to improve.              -Afrin is a nasal spray that can give immediate relief of nasal congestion but you cannot use this medication for more than 3 days              -Simple foods like chicken noodle soup.              - Mucinex for congestion or Mucinex DM for cough during the day time. Delsym helps with coughing at night. Mucinex-D if you have sinus pressure/sinus pain or chest congestion. (caution if history of high blood pressure or palpitations). You must increase your water intake when using expectorants (Mucinex).             -Zyrtec/Claritin/Allegra/Xyzal should help with allergies.  -If you DO NOT have Hypertension or any history of palpitations, it is ok to take over the counter Sudafed or Mucinex D or Allegra-D or Claritin-D or  Zyrtec-D.  -If you do take one of the above, it is ok to combine that with plain over the counter Mucinex or Allegra or Claritin or Zyrtec. If, for example, you are taking Zyrtec -D, you can combine that with Mucinex, but not Mucinex-D.  If you are taking Mucinex-D, you can combine that with plain Allegra or Claritin or Zyrtec.   -If you DO have Hypertension or palpitations, it is safe to take Coricidin HBP for relief of sinus symptoms.     Your test was POSITIVE for COVID-19 (coronavirus).             Seek immediate care in the emergency room in the event of severe abdominal pain, chest pain, respiratory distress, fever unresponsive to antipyretic, dehydration, loss of consciousness, seizure.

## 2024-03-18 NOTE — PROGRESS NOTES
Subjective:       Patient ID: Klever Link is a 79 y.o. male.    Chief Complaint: No chief complaint on file.    For follow-up visit.  It has been 6 months since I last saw patient There are multiple issues to discuss:  1. Laryngopharyngeal reflux:  The patient is rarely having globus sensation, throat clearing phlegm in the throat.  He is trying to follow his diet.  He is taking famotidine 40 mg once daily  2. Sensorineural hearing loss/hearing aid worn:  Patient does wears hearing aid all time when he is awake.  His last audiologic evaluation was in 2021.    3. Tinnitus:  There has been no change in his tinnitus since his last visit.  Tinnitus has become almost unnoticeable since he has started wearing hearing aids.    4. Allergic rhinitis:  Nasal secretions are clear.  He has not having issues breathing through his nose.  He will use fluticasone nasal spray as his primary drug and loratadine on an as-needed basis  5. BPPV, right ear:  He has had no further episodes of vertigo or imbalance since his last visit.            Review of Systems     Constitutional: Negative for appetite change, chills, fatigue, fever and unexpected weight loss.      HENT: Positive for hearing loss, postnasal drip, ringing in the ears, runny nose, sinus pressure, sore throat and stuffy nose.  Negative for ear discharge, ear infection, ear pain, facial swelling, mouth sores, nosebleeds, sinus infection, tonsil infection, dental problems, trouble swallowing and voice change.      Eyes:  Negative for change in eyesight, eye drainage, eye itching and photophobia.     Respiratory:  Positive for cough and snoring. Negative for shortness of breath, sleep apnea and wheezing.  Choking/laryngospasm    Cardiovascular:  Negative for chest pain, foot swelling, irregular heartbeat and swollen veins.     Gastrointestinal:  Negative for abdominal pain, acid reflux, constipation, diarrhea, heartburn and vomiting.     Genitourinary: Negative for  difficulty urinating, sexual problems and frequent urination.     Musc: Negative for aching joints, aching muscles, back pain and neck pain.     Skin: Negative for rash.     Allergy: Positive for seasonal allergies (perennial allergies with seasonal exacerbations). Negative for food allergies.     Endocrine: Negative for cold intolerance and heat intolerance.      Neurological: Negative for dizziness, headaches, light-headedness, seizures and tremors.      Hematologic: Negative for bruises/bleeds easily and swollen glands.      Psychiatric: Negative for decreased concentration, depression, nervous/anxious and sleep disturbance.                Objective:      Physical Exam  Vitals and nursing note reviewed.   Constitutional:       General: He is awake.      Appearance: Normal appearance. He is well-developed, well-groomed and normal weight.   HENT:      Head: Normocephalic.      Jaw: There is normal jaw occlusion.      Salivary Glands: Right salivary gland is not diffusely enlarged or tender. Left salivary gland is not diffusely enlarged or tender.      Right Ear: Ear canal and external ear normal. Decreased hearing noted. There is impacted cerumen. Tympanic membrane is retracted.      Left Ear: Ear canal and external ear normal. Decreased hearing noted. There is impacted cerumen. Tympanic membrane is retracted.      Nose: Septal deviation (to the left), mucosal edema (cyanotic, boggy inferior turbinates bilaterally) and rhinorrhea (clear mucus bilaterally) present. No nasal deformity. Rhinorrhea is clear.      Right Turbinates: Enlarged and pale.      Left Turbinates: Enlarged and pale.      Mouth/Throat:      Lips: No lesions.      Mouth: Mucous membranes are moist. No oral lesions.      Dentition: Abnormal dentition (edentulous upper and lower arches). Has dentures (full upper and lower dentures). No gum lesions.      Tongue: No lesions.      Palate: No mass and lesions.      Pharynx: Oropharynx is clear. Uvula  midline.   Eyes:      General: Lids are normal.         Right eye: No discharge.         Left eye: No discharge.      Conjunctiva/sclera:      Right eye: Right conjunctiva is injected. No exudate.     Left eye: Left conjunctiva is injected. No exudate.     Pupils: Pupils are equal, round, and reactive to light.   Neck:      Thyroid: No thyroid mass or thyromegaly.      Trachea: Trachea normal. No tracheal deviation.   Cardiovascular:      Rate and Rhythm: Normal rate and regular rhythm.      Pulses: Normal pulses.      Heart sounds: Normal heart sounds.   Pulmonary:      Effort: Pulmonary effort is normal.      Breath sounds: Normal breath sounds. No stridor. No decreased breath sounds, wheezing, rhonchi or rales.   Abdominal:      General: Bowel sounds are normal.      Palpations: Abdomen is soft.      Tenderness: There is no abdominal tenderness.   Musculoskeletal:         General: Normal range of motion.      Cervical back: Normal range of motion. No muscular tenderness.   Lymphadenopathy:      Head:      Right side of head: No submental, submandibular, preauricular, posterior auricular or occipital adenopathy.      Left side of head: No submental, submandibular, preauricular, posterior auricular or occipital adenopathy.      Cervical: No cervical adenopathy.   Skin:     General: Skin is warm and dry.      Findings: No petechiae or rash.      Nails: There is no clubbing.   Neurological:      Mental Status: He is alert and oriented to person, place, and time.      Cranial Nerves: No cranial nerve deficit.      Sensory: No sensory deficit.      Gait: Gait normal.   Psychiatric:         Speech: Speech normal.         Behavior: Behavior normal. Behavior is cooperative.         Thought Content: Thought content normal.         Judgment: Judgment normal.         Procedure-cerumen impactions removed bilaterally using wire loop and bayonet forceps.  The patient tolerated procedure well was discharged post procedure        Assessment:       1. Sensorineural hearing loss (SNHL) of both ears    2. Laryngopharyngeal reflux (LPR)    3. Dysphagia, unspecified type    4. Hearing aid worn    5. BPPV (benign paroxysmal positional vertigo), right    6. Allergic rhinitis, unspecified seasonality, unspecified trigger    7. Postnasal drip    8. Bilateral impacted cerumen    9. Tinnitus of both ears            Plan:       I will have the patient continue with his present drug regimen and I will recheck him in 3 months.  At that visit he will need a flexible video nasolaryngoscopy  I am encouraging him to use wax removing drops in his ears every 2 weeks.            DISCLAIMER: This note was prepared with [employer] voice recognition transcription software. Garbled syntax, mangled pronouns, and other bizarre constructions may be attributed to that software system. While efforts were made to correct any mistakes made by this voice recognition program, some errors and/or omissions may remain in the note that were missed when the note was originally created.

## 2024-03-19 ENCOUNTER — OFFICE VISIT (OUTPATIENT)
Dept: OTOLARYNGOLOGY | Facility: CLINIC | Age: 79
End: 2024-03-19
Payer: MEDICARE

## 2024-03-19 DIAGNOSIS — K21.9 LARYNGOPHARYNGEAL REFLUX (LPR): ICD-10-CM

## 2024-03-19 DIAGNOSIS — H93.13 TINNITUS OF BOTH EARS: ICD-10-CM

## 2024-03-19 DIAGNOSIS — H81.11 BPPV (BENIGN PAROXYSMAL POSITIONAL VERTIGO), RIGHT: ICD-10-CM

## 2024-03-19 DIAGNOSIS — R09.82 POSTNASAL DRIP: ICD-10-CM

## 2024-03-19 DIAGNOSIS — H61.23 BILATERAL IMPACTED CERUMEN: ICD-10-CM

## 2024-03-19 DIAGNOSIS — H90.3 SENSORINEURAL HEARING LOSS (SNHL) OF BOTH EARS: Primary | ICD-10-CM

## 2024-03-19 DIAGNOSIS — Z97.4 HEARING AID WORN: ICD-10-CM

## 2024-03-19 DIAGNOSIS — J30.9 ALLERGIC RHINITIS, UNSPECIFIED SEASONALITY, UNSPECIFIED TRIGGER: ICD-10-CM

## 2024-03-19 DIAGNOSIS — R13.10 DYSPHAGIA, UNSPECIFIED TYPE: ICD-10-CM

## 2024-03-19 PROCEDURE — 69209 REMOVE IMPACTED EAR WAX UNI: CPT | Mod: 50,PBBFAC,PN | Performed by: SPECIALIST

## 2024-03-19 PROCEDURE — 99214 OFFICE O/P EST MOD 30 MIN: CPT | Mod: 25,S$PBB,, | Performed by: SPECIALIST

## 2024-03-19 PROCEDURE — 99999 PR PBB SHADOW E&M-EST. PATIENT-LVL II: CPT | Mod: PBBFAC,,, | Performed by: SPECIALIST

## 2024-03-19 PROCEDURE — 99212 OFFICE O/P EST SF 10 MIN: CPT | Mod: PBBFAC,PN | Performed by: SPECIALIST

## 2024-03-19 NOTE — PROCEDURES
"Ear Cerumen Removal    Date/Time: 3/19/2024 8:20 AM    Performed by: EMI Dahl MD  Authorized by: EMI Dahl MD    Time out: Immediately prior to procedure a "time out" was called to verify the correct patient, procedure, equipment, support staff and site/side marked as required.    Consent Done?:  Yes (Verbal)    Local anesthetic:  None  Location details:  Both ears  Procedure type comment:  Wire loop and bayonet forceps  Cerumen  Removal Results:  Cerumen completely removed  Patient tolerance:  Patient tolerated the procedure well with no immediate complications    "

## 2024-04-15 ENCOUNTER — CLINICAL SUPPORT (OUTPATIENT)
Dept: OTOLARYNGOLOGY | Facility: CLINIC | Age: 79
End: 2024-04-15
Payer: MEDICARE

## 2024-04-15 DIAGNOSIS — R94.120 ABNORMALLY COMPLIANT RIGHT MIDDLE EAR SYSTEM WITH TYPE AD TYMPANOGRAM CURVE: ICD-10-CM

## 2024-04-15 DIAGNOSIS — H93.13 BILATERAL TINNITUS: ICD-10-CM

## 2024-04-15 DIAGNOSIS — Z77.122 HISTORY OF EXPOSURE TO NOISE: ICD-10-CM

## 2024-04-15 DIAGNOSIS — R29.2 ABNORMAL ACOUSTIC REFLEX: ICD-10-CM

## 2024-04-15 DIAGNOSIS — H90.3 BILATERAL SENSORINEURAL HEARING LOSS: Primary | Chronic | ICD-10-CM

## 2024-04-15 DIAGNOSIS — H93.299 REDUCED SPEECH DISCRIMINATION: ICD-10-CM

## 2024-04-15 DIAGNOSIS — Z97.4 WEARS HEARING AID IN BOTH EARS: ICD-10-CM

## 2024-04-15 PROCEDURE — 92557 COMPREHENSIVE HEARING TEST: CPT | Mod: S$GLB,,, | Performed by: AUDIOLOGIST-HEARING AID FITTER

## 2024-04-15 PROCEDURE — 92550 TYMPANOMETRY & REFLEX THRESH: CPT | Mod: S$GLB,,, | Performed by: AUDIOLOGIST-HEARING AID FITTER

## 2024-04-15 NOTE — PROGRESS NOTES
Maxine Sanchez, CCC-A  Audiologist - [facility] [address] [place] [address]  [email or web address]  [phone]    Patient: Klever Link   MRN: [id number]  [address] [facility]   Home Phone Not on file.   Work Phone Not on file.   Mobile [phone]   : 1945  LEMUS: 4/15/2024      AUDIOLOGICAL EVALUATION      RECOMMENDATIONS:   It is recommended that Klever Link:  Follow up medically with Dr. Dahl.    Continue wearing his binaural hearing aids to improve speech understanding.  Continue to receive audiological monitoring annually.  Use precaution and/or hearing protection in noisy environments.    If you should have any questions or concerns regarding the above information, please do not hesitate to contact me at [phone].      _______________________________  Maxine Sanchez, IRAM-A  Audiologist

## 2024-04-15 NOTE — Clinical Note
Your patient, Klever Link, was recently seen for an audiogram.  My assessment and recommendations are enclosed.  If you should have any questions or concerns, please contact me at [phone].   Sincerely, Maxine Sanchez, CCC-A Audiologist [facility]

## 2024-06-18 ENCOUNTER — OFFICE VISIT (OUTPATIENT)
Dept: OTOLARYNGOLOGY | Facility: CLINIC | Age: 79
End: 2024-06-18
Payer: MEDICARE

## 2024-06-18 VITALS
SYSTOLIC BLOOD PRESSURE: 192 MMHG | BODY MASS INDEX: 22.66 KG/M2 | WEIGHT: 181.31 LBS | OXYGEN SATURATION: 97 % | DIASTOLIC BLOOD PRESSURE: 81 MMHG | HEART RATE: 71 BPM

## 2024-06-18 DIAGNOSIS — J30.9 ALLERGIC RHINITIS, UNSPECIFIED SEASONALITY, UNSPECIFIED TRIGGER: ICD-10-CM

## 2024-06-18 DIAGNOSIS — R13.10 DYSPHAGIA, UNSPECIFIED TYPE: ICD-10-CM

## 2024-06-18 DIAGNOSIS — H93.13 BILATERAL TINNITUS: ICD-10-CM

## 2024-06-18 DIAGNOSIS — J34.2 NASAL SEPTAL DEVIATION: ICD-10-CM

## 2024-06-18 DIAGNOSIS — K21.9 LARYNGOPHARYNGEAL REFLUX (LPR): ICD-10-CM

## 2024-06-18 DIAGNOSIS — R09.82 POSTNASAL DRIP: ICD-10-CM

## 2024-06-18 DIAGNOSIS — H90.3 BILATERAL SENSORINEURAL HEARING LOSS: Primary | ICD-10-CM

## 2024-06-18 DIAGNOSIS — Z97.4 WEARS HEARING AID IN BOTH EARS: ICD-10-CM

## 2024-06-18 DIAGNOSIS — H81.11 BPPV (BENIGN PAROXYSMAL POSITIONAL VERTIGO), RIGHT: ICD-10-CM

## 2024-06-18 PROCEDURE — 99999 PR PBB SHADOW E&M-EST. PATIENT-LVL IV: CPT | Mod: PBBFAC,,, | Performed by: SPECIALIST

## 2024-06-18 PROCEDURE — 99214 OFFICE O/P EST MOD 30 MIN: CPT | Mod: 25,S$PBB,, | Performed by: SPECIALIST

## 2024-06-18 PROCEDURE — 31575 DIAGNOSTIC LARYNGOSCOPY: CPT | Mod: PBBFAC,PN | Performed by: SPECIALIST

## 2024-06-18 PROCEDURE — 99214 OFFICE O/P EST MOD 30 MIN: CPT | Mod: PBBFAC,PN | Performed by: SPECIALIST

## 2024-06-18 RX ORDER — FLUTICASONE PROPIONATE 50 MCG
2 SPRAY, SUSPENSION (ML) NASAL 2 TIMES DAILY PRN
Qty: 54.6 ML | Refills: 3 | Status: SHIPPED | OUTPATIENT
Start: 2024-06-18

## 2024-06-18 RX ORDER — FAMOTIDINE 40 MG/1
TABLET, FILM COATED ORAL
Qty: 90 TABLET | Refills: 3 | Status: SHIPPED | OUTPATIENT
Start: 2024-06-18

## 2024-06-18 NOTE — PROGRESS NOTES
Subjective:       Patient ID: Klever Link is a 79 y.o. male.    Chief Complaint: No chief complaint on file.    For follow-up visit.  It has been 3 months since I last saw patient There are multiple issues to discuss:  1. Laryngopharyngeal reflux:  The patient does not feel that his LPR symptoms have worsened.  He is rarely having globus sensation, throat clearing phlegm in the throat.   He is taking famotidine 40 mg once daily  2. Sensorineural hearing loss/hearing aid worn:  Patient does wears hearing aid all time when he is awake.  [facility].  He is coming in to discuss results of audiogram done in April of this year.    3. Tinnitus:  There has been no change in his tinnitus since his last visit.  Tinnitus has become almost unnoticeable since he has started wearing hearing aids.    4. Allergic rhinitis:  Nasal secretions are clear.  He has not having issues breathing through his nose.  He will use fluticasone nasal spray as his primary drug and loratadine on an as-needed basis  5. BPPV, right ear:  He has had no further episodes of vertigo or imbalance since his last visit.            Review of Systems     Constitutional: Negative for appetite change, chills, fatigue, fever and unexpected weight loss.      HENT: Positive for hearing loss, postnasal drip, ringing in the ears, runny nose, sinus pressure, sore throat and stuffy nose.  Negative for ear discharge, ear infection, ear pain, facial swelling, mouth sores, nosebleeds, sinus infection, tonsil infection, dental problems, trouble swallowing and voice change.      Eyes:  Negative for change in eyesight, eye drainage, eye itching and photophobia.     Respiratory:  Positive for cough and snoring. Negative for shortness of breath, sleep apnea and wheezing.  Choking/laryngospasm    Cardiovascular:  Negative for chest pain, foot swelling, irregular heartbeat and swollen veins.     Gastrointestinal:  Negative  for abdominal pain, acid reflux, constipation, diarrhea, heartburn and vomiting.     Genitourinary: Negative for difficulty urinating, sexual problems and frequent urination.     Musc: Negative for aching joints, aching muscles, back pain and neck pain.     Skin: Negative for rash.     Allergy: Positive for seasonal allergies (perennial allergies with seasonal exacerbations). Negative for food allergies.     Endocrine: Negative for cold intolerance and heat intolerance.      Neurological: Positive for dizziness and light-headedness. Negative for headaches, seizures and tremors.      Hematologic: Negative for bruises/bleeds easily and swollen glands.      Psychiatric: Negative for decreased concentration, depression, nervous/anxious and sleep disturbance.                Objective:      Physical Exam  Vitals and nursing note reviewed.   Constitutional:       General: He is awake.      Appearance: Normal appearance. He is well-developed, well-groomed and normal weight.   HENT:      Head: Normocephalic.      Jaw: There is normal jaw occlusion.      Salivary Glands: Right salivary gland is not diffusely enlarged or tender. Left salivary gland is not diffusely enlarged or tender.      Right Ear: Ear canal and external ear normal. Decreased hearing noted. There is impacted cerumen. Tympanic membrane is retracted.      Left Ear: Ear canal and external ear normal. Decreased hearing noted. There is impacted cerumen. Tympanic membrane is retracted.      Nose: Septal deviation (to the left), mucosal edema (cyanotic, boggy inferior turbinates bilaterally) and rhinorrhea (clear mucus bilaterally) present. No nasal deformity. Rhinorrhea is clear.      Right Turbinates: Enlarged and pale.      Left Turbinates: Enlarged and pale.      Mouth/Throat:      Lips: No lesions.      Mouth: Mucous membranes are moist. No oral lesions.      Dentition: Abnormal dentition (edentulous upper and lower arches). Has dentures (full upper and lower  dentures). No gum lesions.      Tongue: No lesions.      Palate: No mass and lesions.      Pharynx: Oropharynx is clear. Uvula midline.   Eyes:      General: Lids are normal.         Right eye: No discharge.         Left eye: No discharge.      Conjunctiva/sclera:      Right eye: Right conjunctiva is injected. No exudate.     Left eye: Left conjunctiva is injected. No exudate.     Pupils: Pupils are equal, round, and reactive to light.   Neck:      Thyroid: No thyroid mass or thyromegaly.      Trachea: Trachea normal. No tracheal deviation.   Cardiovascular:      Rate and Rhythm: Normal rate and regular rhythm.      Pulses: Normal pulses.      Heart sounds: Normal heart sounds.   Pulmonary:      Effort: Pulmonary effort is normal.      Breath sounds: Normal breath sounds. No stridor. No decreased breath sounds, wheezing, rhonchi or rales.   Abdominal:      General: Bowel sounds are normal.      Palpations: Abdomen is soft.      Tenderness: There is no abdominal tenderness.   Musculoskeletal:         General: Normal range of motion.      Cervical back: Normal range of motion. No muscular tenderness.   Lymphadenopathy:      Head:      Right side of head: No submental, submandibular, preauricular, posterior auricular or occipital adenopathy.      Left side of head: No submental, submandibular, preauricular, posterior auricular or occipital adenopathy.      Cervical: No cervical adenopathy.   Skin:     General: Skin is warm and dry.      Findings: No petechiae or rash.      Nails: There is no clubbing.   Neurological:      Mental Status: He is alert and oriented to person, place, and time.      Cranial Nerves: No cranial nerve deficit.      Sensory: No sensory deficit.      Gait: Gait normal.   Psychiatric:         Speech: Speech normal.         Behavior: Behavior normal. Behavior is cooperative.         Thought Content: Thought content normal.         Judgment: Judgment normal.         Procedure-cerumen impactions  removed bilaterally using wire loop and bayonet forceps.  The patient tolerated procedure well was discharged post procedure               Flexible Nasolaryngoscopy:  Nasal septal deviation and nasal changes of allergic rhinitis; laryngeal changes consistent with laryngal pharyngeal reflux that is improving on H2 agonist therapy    Laryngeal pictures:                  Nasopharynx/torus tubarius pictures:              Right nasal passage pictures:          Left nasal passage pictures:              Assessment:       1. Bilateral sensorineural hearing loss    2. Wears hearing aid in both ears    3. Bilateral tinnitus    4. Laryngopharyngeal reflux (LPR)    5. Dysphagia, unspecified type    6. BPPV (benign paroxysmal positional vertigo), right    7. Allergic rhinitis, unspecified seasonality, unspecified trigger    8. Postnasal drip    9. Nasal septal deviation            Plan:       I will have the patient continue with his present drug regimen and I will recheck him in 3 months.  I have giving him copies of his [facility] needs an [facility] on his hearing aids.  I am giving her a copy of the home Epley maneuver for the right ear to use on an as-needed basis.  I will recheck him in 6 months, or sooner on an as-needed basis                DISCLAIMER: This note was prepared with [employer] voice recognition transcription software. Garbled syntax, mangled pronouns, and other bizarre constructions may be attributed to that software system. While efforts were made to correct any mistakes made by this voice recognition program, some errors and/or omissions may remain in the note that were missed when the note was originally created.

## 2024-06-18 NOTE — PROCEDURES
"Laryngoscopy    Date/Time: 6/18/2024 11:00 AM    Performed by: EMI Dahl MD  Authorized by: EMI Dahl MD    Time out: Immediately prior to procedure a "time out" was called to verify the correct patient, procedure, equipment, support staff and site/side marked as required.    Consent Done?:  Yes (Verbal)  Anesthesia:     Local anesthetic:  4% Xylocaine spray with Viral-Synephrine    Patient tolerance:  Patient tolerated the procedure well with no immediate complications    Decongestion performed?: Yes    Laryngoscopy:     Areas examined:  Larynx, hypopharynx, oropharynx, nasopharynx, nasal cavities and vocal cords    Laryngoscope size:  4 mm (Flexible video nasolaryngoscopy)  Nose External:      No external nasal deformity  Nose Intranasal:      Mucosa no polyps     Mucosa ulcers not present     No mucosa lesions present (clear mucus in the nasal passages bilaterally)     Septum gross deformity (Septum deviated high to the right and on the floor the nose to the left)     Enlarged turbinates (Inferior turbinates enlarged bilaterally, left middle turbinate enlarged/widened)  Nasopharynx:      No mucosa lesions     Adenoids not present     Posterior choanae patent     Eustachian tube patent  Larynx/hypopharynx:      No epiglottis lesions     No epiglottis edema     No AE folds lesions     No vocal cord polyps     Equal and normal bilateral (vocal cords move symmetrically, no mucosal lesions)     No hypopharynx lesions     No piriform sinus pooling     No piriform sinus lesions     Post cricoid edema (Mild, improved from last endoscopy)     Post cricoid erythema (mild, improved from last endoscopy)     Flexible video nasolaryngoscopy-nasal septal deviation and nasal changes of allergic rhinitis; laryngeal changes consistent with laryngal pharyngeal reflux that is slowly improving with H2 agonist therapy    "

## 2024-07-31 ENCOUNTER — HOSPITAL ENCOUNTER (OUTPATIENT)
Dept: RADIOLOGY | Facility: OTHER | Age: 79
Discharge: HOME | End: 2024-07-31
Attending: INTERNAL MEDICINE
Payer: MEDICARE

## 2024-07-31 DIAGNOSIS — R91.1 PULMONARY NODULE: ICD-10-CM

## 2024-07-31 PROCEDURE — 71250 CT THORAX DX C-: CPT | Mod: TC

## 2024-07-31 PROCEDURE — 71250 CT THORAX DX C-: CPT | Mod: 26,,, | Performed by: RADIOLOGY

## 2025-04-07 ENCOUNTER — TELEPHONE (OUTPATIENT)
Dept: OTOLARYNGOLOGY | Facility: CLINIC | Age: 80
End: 2025-04-07
Payer: MEDICARE

## 2025-04-07 ENCOUNTER — OFFICE VISIT (OUTPATIENT)
Dept: OTOLARYNGOLOGY | Facility: CLINIC | Age: 80
End: 2025-04-07
Payer: MEDICARE

## 2025-04-07 VITALS
OXYGEN SATURATION: 96 % | DIASTOLIC BLOOD PRESSURE: 80 MMHG | SYSTOLIC BLOOD PRESSURE: 142 MMHG | BODY MASS INDEX: 21.63 KG/M2 | HEART RATE: 76 BPM | WEIGHT: 173.06 LBS

## 2025-04-07 DIAGNOSIS — J30.9 ALLERGIC RHINITIS, UNSPECIFIED SEASONALITY, UNSPECIFIED TRIGGER: ICD-10-CM

## 2025-04-07 DIAGNOSIS — H90.3 BILATERAL SENSORINEURAL HEARING LOSS: Primary | ICD-10-CM

## 2025-04-07 DIAGNOSIS — H81.11 BPPV (BENIGN PAROXYSMAL POSITIONAL VERTIGO), RIGHT: ICD-10-CM

## 2025-04-07 DIAGNOSIS — Z97.4 WEARS HEARING AID IN BOTH EARS: ICD-10-CM

## 2025-04-07 DIAGNOSIS — H61.23 BILATERAL IMPACTED CERUMEN: ICD-10-CM

## 2025-04-07 DIAGNOSIS — K21.9 LARYNGOPHARYNGEAL REFLUX (LPR): ICD-10-CM

## 2025-04-07 DIAGNOSIS — H93.13 BILATERAL TINNITUS: ICD-10-CM

## 2025-04-07 PROCEDURE — 99213 OFFICE O/P EST LOW 20 MIN: CPT | Mod: PBBFAC,PN | Performed by: SPECIALIST

## 2025-04-07 PROCEDURE — 99999 PR PBB SHADOW E&M-EST. PATIENT-LVL III: CPT | Mod: PBBFAC,,, | Performed by: SPECIALIST

## 2025-04-07 PROCEDURE — 69210 REMOVE IMPACTED EAR WAX UNI: CPT | Mod: PBBFAC,PN | Performed by: SPECIALIST

## 2025-04-07 NOTE — PROGRESS NOTES
Subjective:       Patient ID: Klever Link is a 80 y.o. male.    Chief Complaint: Follow-up    For follow-up visit.  It has been 3 months since I last saw patient There are multiple issues to discuss:  1. Laryngopharyngeal reflux:  The patient is doing well and reports rarely experiencing symptoms.  He is rarely having globus sensation, throat clearing phlegm in the throat.   He is taking famotidine 40 mg once daily  2. Sensorineural hearing loss/hearing aid worn:  Patient wears hearing aids and is doing well with them.  He obtained his hearing aids from the [facility].  Annual appointment with audiology is scheduled for tomorrow.    3. Tinnitus:  There has been no change in his tinnitus since his last visit.  Tinnitus has become almost unnoticeable since he has started wearing hearing aids.    4. Allergic rhinitis:  Nasal secretions are clear.  He has not having issues breathing through his nose.  He will use fluticasone nasal spray as needed.  5. BPPV, right ear:  He has had no further episodes of vertigo or imbalance since his last visit.    Follow-up  Associated symptoms include coughing and a sore throat. Pertinent negatives include no abdominal pain, chest pain, chills, fever, headaches, neck pain, rash, swollen glands or vomiting.         Review of Systems     Constitutional: Negative for appetite change, chills, fatigue, fever and unexpected weight loss.      HENT: Positive for hearing loss, postnasal drip, ringing in the ears, runny nose, sinus pressure, sore throat and stuffy nose.  Negative for ear discharge, ear infection, ear pain, facial swelling, mouth sores, nosebleeds, sinus infection, tonsil infection, dental problems, trouble swallowing and voice change.      Eyes:  Negative for change in eyesight, eye drainage, eye itching and photophobia.     Respiratory:  Positive for cough and snoring. Negative for shortness of breath, sleep apnea and wheezing.   Choking/laryngospasm    Cardiovascular:  Negative for chest pain, foot swelling, irregular heartbeat and swollen veins.     Gastrointestinal:  Negative for abdominal pain, acid reflux, constipation, diarrhea, heartburn and vomiting.     Genitourinary: Negative for difficulty urinating, sexual problems and frequent urination.     Musc: Negative for aching joints, aching muscles, back pain and neck pain.     Skin: Negative for rash.     Allergy: Positive for seasonal allergies (perennial allergies with seasonal exacerbations). Negative for food allergies.     Endocrine: Negative for cold intolerance and heat intolerance.      Neurological: Positive for dizziness and light-headedness. Negative for headaches, seizures and tremors.      Hematologic: Negative for bruises/bleeds easily and swollen glands.      Psychiatric: Negative for decreased concentration, depression, nervous/anxious and sleep disturbance.              Objective:      Physical Exam  Vitals and nursing note reviewed.   Constitutional:       General: He is awake.      Appearance: Normal appearance. He is well-developed, well-groomed and normal weight.   HENT:      Head: Normocephalic.      Jaw: There is normal jaw occlusion.      Salivary Glands: Right salivary gland is not diffusely enlarged or tender. Left salivary gland is not diffusely enlarged or tender.      Right Ear: Ear canal and external ear normal. Decreased hearing noted. There is impacted cerumen. Tympanic membrane is retracted.      Left Ear: Ear canal and external ear normal. Decreased hearing noted. There is impacted cerumen. Tympanic membrane is retracted.      Nose: Septal deviation (to the left), mucosal edema (cyanotic, boggy inferior turbinates bilaterally) and rhinorrhea (clear mucus bilaterally) present. No nasal deformity. Rhinorrhea is clear.      Right Turbinates: Enlarged and pale.      Left Turbinates: Enlarged and pale.      Mouth/Throat:      Lips: No lesions.      Mouth:  Mucous membranes are moist. No oral lesions.      Dentition: Abnormal dentition (edentulous upper and lower arches). Has dentures (full upper and lower dentures). No gum lesions.      Tongue: No lesions.      Palate: No mass and lesions.      Pharynx: Oropharynx is clear. Uvula midline.   Eyes:      General: Lids are normal.         Right eye: No discharge.         Left eye: No discharge.      Conjunctiva/sclera:      Right eye: Right conjunctiva is injected. No exudate.     Left eye: Left conjunctiva is injected. No exudate.     Pupils: Pupils are equal, round, and reactive to light.   Neck:      Thyroid: No thyroid mass or thyromegaly.      Trachea: Trachea normal. No tracheal deviation.   Cardiovascular:      Rate and Rhythm: Normal rate and regular rhythm.      Pulses: Normal pulses.      Heart sounds: Normal heart sounds.   Pulmonary:      Effort: Pulmonary effort is normal.      Breath sounds: Normal breath sounds. No stridor. No decreased breath sounds, wheezing, rhonchi or rales.   Abdominal:      General: Bowel sounds are normal.      Palpations: Abdomen is soft.      Tenderness: There is no abdominal tenderness.   Musculoskeletal:         General: Normal range of motion.      Cervical back: Normal range of motion. No muscular tenderness.   Lymphadenopathy:      Head:      Right side of head: No submental, submandibular, preauricular, posterior auricular or occipital adenopathy.      Left side of head: No submental, submandibular, preauricular, posterior auricular or occipital adenopathy.      Cervical: No cervical adenopathy.   Skin:     General: Skin is warm and dry.      Findings: No petechiae or rash.      Nails: There is no clubbing.   Neurological:      Mental Status: He is alert and oriented to person, place, and time.      Cranial Nerves: No cranial nerve deficit.      Sensory: No sensory deficit.      Gait: Gait normal.   Psychiatric:         Speech: Speech normal.         Behavior: Behavior  normal. Behavior is cooperative.         Thought Content: Thought content normal.         Judgment: Judgment normal.       Procedure-cerumen impactions removed bilaterally using wire loop and bayonet forceps.  The patient tolerated procedure well was discharged post procedure                      Assessment:       1. Bilateral sensorineural hearing loss    2. Wears hearing aid in both ears    3. Bilateral tinnitus    4. Laryngopharyngeal reflux (LPR)    5. BPPV (benign paroxysmal positional vertigo), right    6. Allergic rhinitis, unspecified seasonality, unspecified trigger              Plan:       I will have the patient continue with his present drug regimen.  He will home Epley maneuver for the right ear  on an as-needed basis.  I will recheck him in 6 months, or sooner on an as-needed basis.                DISCLAIMER: This note was prepared with [employer] voice recognition transcription software. Garbled syntax, mangled pronouns, and other bizarre constructions may be attributed to that software system. While efforts were made to correct any mistakes made by this voice recognition program, some errors and/or omissions may remain in the note that were missed when the note was originally created.

## 2025-04-07 NOTE — PROCEDURES
"Ear Cerumen Removal    Date/Time: 4/7/2025 11:20 AM    Performed by: EMI Dahl MD  Authorized by: EMI Dahl MD    Time out: Immediately prior to procedure a "time out" was called to verify the correct patient, procedure, equipment, support staff and site/side marked as required.    Consent Done?:  Yes (Verbal)    Local anesthetic:  None  Location details:  Both ears  Procedure type: curette    Procedure type comment:  Wire loop  Cerumen  Removal Results:  Cerumen completely removed  Patient tolerance:  Patient tolerated the procedure well with no immediate complications    "

## 2025-04-08 ENCOUNTER — CLINICAL SUPPORT (OUTPATIENT)
Dept: OTOLARYNGOLOGY | Facility: CLINIC | Age: 80
End: 2025-04-08
Payer: MEDICARE

## 2025-04-08 DIAGNOSIS — R42 DIZZINESS: ICD-10-CM

## 2025-04-08 DIAGNOSIS — H93.299 REDUCED SPEECH DISCRIMINATION: ICD-10-CM

## 2025-04-08 DIAGNOSIS — H93.13 BILATERAL TINNITUS: ICD-10-CM

## 2025-04-08 DIAGNOSIS — H90.3 SENSORINEURAL HEARING LOSS (SNHL) OF BOTH EARS: Primary | ICD-10-CM

## 2025-04-08 DIAGNOSIS — Z97.4 WEARS HEARING AID IN BOTH EARS: ICD-10-CM

## 2025-04-08 DIAGNOSIS — H74.8X9 ABNORMAL ACOUSTIC REFLEX: ICD-10-CM

## 2025-04-08 DIAGNOSIS — H90.3 BILATERAL SENSORINEURAL HEARING LOSS: Primary | ICD-10-CM

## 2025-04-08 DIAGNOSIS — R94.120 ABNORMALLY COMPLIANT MIDDLE EAR SYSTEM WITH TYPE AD TYMPANOGRAM CURVE OF BOTH EARS: ICD-10-CM

## 2025-04-08 DIAGNOSIS — Z77.122 HISTORY OF EXPOSURE TO NOISE: ICD-10-CM

## 2025-04-08 PROCEDURE — 92557 COMPREHENSIVE HEARING TEST: CPT | Mod: S$GLB,,, | Performed by: AUDIOLOGIST-HEARING AID FITTER

## 2025-04-08 PROCEDURE — 92550 TYMPANOMETRY & REFLEX THRESH: CPT | Mod: S$GLB,,, | Performed by: AUDIOLOGIST-HEARING AID FITTER

## 2025-04-08 NOTE — Clinical Note
Your patient, Klever Link, was recently seen for an audiogram.  My assessment and recommendations are enclosed.  If you should have any questions or concerns, please contact me at [phone].   Sincerely, Maxine Sanchez, CCC-A Audiologist [facility]

## 2025-04-14 ENCOUNTER — TELEPHONE (OUTPATIENT)
Dept: OTOLARYNGOLOGY | Facility: CLINIC | Age: 80
End: 2025-04-14
Payer: MEDICARE

## 2025-04-14 NOTE — PROGRESS NOTES
Maxine Sanchez, CCC-A  Audiologist - [facility] [address] [place] [address]  [email or web address]  [phone]    Patient: Klever Link   MRN: [id number]  [address] [facility]   Home Phone [phone]   Work Phone Not on file.   Mobile [phone]   : 1945  LEMUS: 2025      AUDIOLOGICAL EVALUATION    Mr. Klever Link was seen in the clinic today for an audiological evaluation.  Mr. Link reported decreased hearing, tinnitus, dizziness, and history of noise exposure.  Mr. Link denied family history of hearing loss.    Audiological testing revealed a mild to moderately-severe sensorineural hearing loss for the Right ear and a mild to moderately-severe sensorineural hearing loss for the Left ear.  A speech reception threshold was obtained at 45 dB HL for the Right ear and at 45 dB HL for the Left ear.  Speech discrimination was 68% for the Right ear and 72% for the Left ear.      Tympanometry testing revealed a Type Ad (3.45 mmho) tympanogram for the Right ear and a Type Ad (1.69 mmho) tympanogram for the Left ear.  Acoustic reflex thresholds were elevated/absent ipsilaterally in both ears.      RECOMMENDATIONS:   It is recommended that Klever Link:  Follow up medically with Dr. Dahl.  Continue wearing his binaural hearing aids to improve speech understanding.  Continue to receive audiological monitoring annually.  Use precaution and/or hearing protection in noisy environments.    If you should have any questions or concerns regarding the above information, please do not hesitate to contact me at [phone].      _______________________________  Maxine Sanchez, IRAM-A  Audiologist

## 2025-07-06 NOTE — PROGRESS NOTES
Subjective:       Patient ID: Klever Link is a 80 y.o. male.    Chief Complaint: Follow-up    For follow-up visit.  It has been 3 months since I last saw patient There are multiple issues to discuss:  1. Laryngopharyngeal reflux:  The patient is having minimal symptoms from his laryngal pharyngeal reflux.  He rarely has throat clearing.  He does not have dysphagia, choking on food or globus sensation or phlegm in the throat.   He is taking famotidine 40 mg once daily  2. Sensorineural hearing loss/hearing aid worn:  Patient wears hearing aids and is doing well with them.    3. Tinnitus:  There has been no change in his tinnitus since his last visit.   4. Allergic rhinitis:  Nasal secretions are clear.  The patient is not having significant issues with his breathing through the nose.  He will use fluticasone nasal spray as needed.  5. BPPV, right ear:  He has had no further episodes of vertigo or imbalance since his last visit.  6. Unexeplained Weight Loss:  The patient has lost 17 lb in the last 3 months.  He is eating when he is hungry.  He has 2 or 3 meals daily.  He is not having any pain, vomiting or diarrhea or coughing.    Follow-up  Associated symptoms include coughing and a sore throat. Pertinent negatives include no abdominal pain, chest pain, chills, fever, headaches, neck pain, rash, swollen glands or vomiting.         Review of Systems     Constitutional: Negative for appetite change, chills, fatigue, fever and unexpected weight loss.      HENT: Positive for hearing loss, postnasal drip, ringing in the ears, runny nose, sinus pressure, sore throat and stuffy nose.  Negative for ear discharge, ear infection, ear pain, facial swelling, mouth sores, nosebleeds, sinus infection, tonsil infection, dental problems, trouble swallowing and voice change.      Eyes:  Negative for change in eyesight, eye drainage, eye itching and photophobia.     Respiratory:  Positive for cough and snoring. Negative for  shortness of breath, sleep apnea and wheezing.  Choking/laryngospasm    Cardiovascular:  Negative for chest pain, foot swelling, irregular heartbeat and swollen veins.     Gastrointestinal:  Negative for abdominal pain, acid reflux, constipation, diarrhea, heartburn and vomiting.     Genitourinary: Negative for difficulty urinating, sexual problems and frequent urination.     Musc: Negative for aching joints, aching muscles, back pain and neck pain.     Skin: Negative for rash.     Allergy: Positive for seasonal allergies (perennial allergies with seasonal exacerbations). Negative for food allergies.     Endocrine: Negative for cold intolerance and heat intolerance.      Neurological: Positive for dizziness and light-headedness. Negative for headaches, seizures and tremors.      Hematologic: Negative for bruises/bleeds easily and swollen glands.      Psychiatric: Negative for decreased concentration, depression, nervous/anxious and sleep disturbance.                Objective:      Physical Exam  Vitals and nursing note reviewed.   Constitutional:       General: He is awake.      Appearance: Normal appearance. He is well-developed, well-groomed and normal weight.   HENT:      Head: Normocephalic.      Jaw: There is normal jaw occlusion.      Salivary Glands: Right salivary gland is not diffusely enlarged or tender. Left salivary gland is not diffusely enlarged or tender.      Right Ear: Ear canal and external ear normal. Decreased hearing noted. There is impacted cerumen. Tympanic membrane is retracted.      Left Ear: Ear canal and external ear normal. Decreased hearing noted. There is impacted cerumen. Tympanic membrane is retracted.      Nose: Septal deviation (to the left), mucosal edema (cyanotic, boggy inferior turbinates bilaterally) and rhinorrhea (clear mucus bilaterally) present. No nasal deformity. Rhinorrhea is clear.      Right Turbinates: Enlarged and pale.      Left Turbinates: Enlarged and pale.       Mouth/Throat:      Lips: No lesions.      Mouth: Mucous membranes are moist. No oral lesions.      Dentition: Abnormal dentition (edentulous upper and lower arches). Has dentures (full upper and lower dentures). No gum lesions.      Tongue: No lesions.      Palate: No mass and lesions.      Pharynx: Oropharynx is clear. Uvula midline.   Eyes:      General: Lids are normal.         Right eye: No discharge.         Left eye: No discharge.      Conjunctiva/sclera:      Right eye: Right conjunctiva is injected. No exudate.     Left eye: Left conjunctiva is injected. No exudate.     Pupils: Pupils are equal, round, and reactive to light.   Neck:      Thyroid: No thyroid mass or thyromegaly.      Trachea: Trachea normal. No tracheal deviation.   Cardiovascular:      Rate and Rhythm: Normal rate and regular rhythm.      Pulses: Normal pulses.      Heart sounds: Normal heart sounds.   Pulmonary:      Effort: Pulmonary effort is normal.      Breath sounds: Normal breath sounds. No stridor. No decreased breath sounds, wheezing, rhonchi or rales.   Abdominal:      General: Bowel sounds are normal.      Palpations: Abdomen is soft.      Tenderness: There is no abdominal tenderness.   Musculoskeletal:         General: Normal range of motion.      Cervical back: Normal range of motion. No muscular tenderness.   Lymphadenopathy:      Head:      Right side of head: No submental, submandibular, preauricular, posterior auricular or occipital adenopathy.      Left side of head: No submental, submandibular, preauricular, posterior auricular or occipital adenopathy.      Cervical: No cervical adenopathy.   Skin:     General: Skin is warm and dry.      Findings: No petechiae or rash.      Nails: There is no clubbing.   Neurological:      Mental Status: He is alert and oriented to person, place, and time.      Cranial Nerves: No cranial nerve deficit.      Sensory: No sensory deficit.      Gait: Gait normal.   Psychiatric:         Speech:  Speech normal.         Behavior: Behavior normal. Behavior is cooperative.         Thought Content: Thought content normal.         Judgment: Judgment normal.         Audiogram performed after last visit        I personally reviewed the above audiograms and discuss them during the visit with the patient.  Pertinent findings:  Mild-to-moderate sensorineural hearing loss, mild worsening of auditory discrimination bilaterally from previous audiogram, Type AD tympanogram on the right and Type A tympanogram on the left.    Flexible video nasolaryngoscopy:  Nasal septal deviation and nasal changes of allergic rhinitis; laryngeal changes consistent with laryngal pharyngeal reflux that has improved significantly since last endoscopy    Laryngeal pictures:                Nasopharynx/torus tubarius pictures:              Left nasal passage pictures:          Right nasal passage pictures:            Assessment:       1. Bilateral sensorineural hearing loss    2. Wears hearing aid in both ears    3. Laryngopharyngeal reflux (LPR)    4. Dysphagia, unspecified type    5. BPPV (benign paroxysmal positional vertigo), right    6. Allergic rhinitis, unspecified seasonality, unspecified trigger    7. Postnasal drip    8. Nasal septal deviation    9. Weight loss, unintentional                Plan:       I  will have the patient continue with the current medications.  We did discuss the possibility of him you using the famotidine on an as-needed basis only but will have him continue daily use for now.  I am sending a message to his PCP regarding the weight loss.  I will recheck him in 3 months, or sooner on an as-needed basis                   DISCLAIMER: This note was prepared with [employer] voice recognition transcription software. Garbled syntax, mangled pronouns, and other bizarre constructions may be attributed to that software system. While efforts were made to correct any mistakes made by this voice recognition program, some errors  and/or omissions may remain in the note that were missed when the note was originally created.

## 2025-07-07 ENCOUNTER — OFFICE VISIT (OUTPATIENT)
Dept: OTOLARYNGOLOGY | Facility: CLINIC | Age: 80
End: 2025-07-07
Payer: MEDICARE

## 2025-07-07 VITALS
WEIGHT: 156.19 LBS | DIASTOLIC BLOOD PRESSURE: 80 MMHG | SYSTOLIC BLOOD PRESSURE: 185 MMHG | BODY MASS INDEX: 19.52 KG/M2

## 2025-07-07 DIAGNOSIS — R13.10 DYSPHAGIA, UNSPECIFIED TYPE: ICD-10-CM

## 2025-07-07 DIAGNOSIS — R63.4 WEIGHT LOSS, UNINTENTIONAL: ICD-10-CM

## 2025-07-07 DIAGNOSIS — J34.2 NASAL SEPTAL DEVIATION: ICD-10-CM

## 2025-07-07 DIAGNOSIS — J30.9 ALLERGIC RHINITIS, UNSPECIFIED SEASONALITY, UNSPECIFIED TRIGGER: ICD-10-CM

## 2025-07-07 DIAGNOSIS — H90.3 BILATERAL SENSORINEURAL HEARING LOSS: Primary | ICD-10-CM

## 2025-07-07 DIAGNOSIS — K21.9 LARYNGOPHARYNGEAL REFLUX (LPR): ICD-10-CM

## 2025-07-07 DIAGNOSIS — R09.82 POSTNASAL DRIP: ICD-10-CM

## 2025-07-07 DIAGNOSIS — H81.11 BPPV (BENIGN PAROXYSMAL POSITIONAL VERTIGO), RIGHT: ICD-10-CM

## 2025-07-07 DIAGNOSIS — Z97.4 WEARS HEARING AID IN BOTH EARS: ICD-10-CM

## 2025-07-07 PROCEDURE — 99213 OFFICE O/P EST LOW 20 MIN: CPT | Mod: PBBFAC,PN | Performed by: SPECIALIST

## 2025-07-07 PROCEDURE — 99999 PR PBB SHADOW E&M-EST. PATIENT-LVL III: CPT | Mod: PBBFAC,,, | Performed by: SPECIALIST

## 2025-07-07 PROCEDURE — 31575 DIAGNOSTIC LARYNGOSCOPY: CPT | Mod: PBBFAC,PN | Performed by: SPECIALIST

## 2025-07-07 PROCEDURE — 99214 OFFICE O/P EST MOD 30 MIN: CPT | Mod: 25,S$PBB,, | Performed by: SPECIALIST

## 2025-07-08 NOTE — PROCEDURES
"Laryngoscopy    Date/Time: 7/7/2025 2:00 PM    Performed by: EMI Dahl MD  Authorized by: EMI Dahl MD    Time out: Immediately prior to procedure a "time out" was called to verify the correct patient, procedure, equipment, support staff and site/side marked as required.    Consent Done?:  Yes (Verbal)  Anesthesia:     Local anesthetic:  4% Xylocaine spray with Viral-Synephrine    Patient tolerance:  Patient tolerated the procedure well with no immediate complications    Decongestion performed?: Yes    Laryngoscopy:     Areas examined:  Larynx, hypopharynx, oropharynx, nasopharynx, nasal cavities and vocal cords    Laryngoscope size:  4 mm (Flexible video nasolaryngoscopy)  Nose External:      No external nasal deformity  Nose Intranasal:      Mucosa no polyps     Mucosa ulcers not present     No mucosa lesions present (clear mucus in the nasal passages bilaterally)     Septum gross deformity (nasal septum deviated high in the right side of nose on the floor the nose to the left with a posterior bony spur on the left)     Enlarged turbinates (inferior turbinates enlarged bilaterally)  Nasopharynx:      No mucosa lesions     Adenoids not present     Posterior choanae patent     Eustachian tube patent  Larynx/hypopharynx:      No epiglottis lesions     No epiglottis edema     No AE folds lesions     No vocal cord polyps     Equal and normal bilateral (vocal cords move symmetrically, no mucosal lesions)     No hypopharynx lesions     No piriform sinus pooling     No piriform sinus lesions     Post cricoid edema (minimal, improved from last endoscopy)     Post cricoid erythema (minimal, improved from last endoscopy)     Flexible video nasolaryngoscopy-nasal septal deviation and nasal changes allergic rhinitis; laryngeal changes consistent with laryngal pharyngeal reflux that continues to improve with the H2 agonist therapy    "